# Patient Record
Sex: MALE | Race: WHITE | NOT HISPANIC OR LATINO | Employment: OTHER | URBAN - METROPOLITAN AREA
[De-identification: names, ages, dates, MRNs, and addresses within clinical notes are randomized per-mention and may not be internally consistent; named-entity substitution may affect disease eponyms.]

---

## 2017-11-07 ENCOUNTER — ALLSCRIPTS OFFICE VISIT (OUTPATIENT)
Dept: OTHER | Facility: OTHER | Age: 67
End: 2017-11-07

## 2017-11-07 ENCOUNTER — GENERIC CONVERSION - ENCOUNTER (OUTPATIENT)
Dept: OTHER | Facility: OTHER | Age: 67
End: 2017-11-07

## 2017-11-07 DIAGNOSIS — N28.89 OTHER SPECIFIED DISORDERS OF KIDNEY AND URETER: ICD-10-CM

## 2017-11-07 LAB
BILIRUB UR QL STRIP: NORMAL
CLARITY UR: NORMAL
COLOR UR: YELLOW
GLUCOSE (HISTORICAL): NORMAL
HGB UR QL STRIP.AUTO: NORMAL
KETONES UR STRIP-MCNC: NORMAL MG/DL
LEUKOCYTE ESTERASE UR QL STRIP: NORMAL
NITRITE UR QL STRIP: NORMAL
PH UR STRIP.AUTO: 6.5 [PH]
PROT UR STRIP-MCNC: NORMAL MG/DL
SP GR UR STRIP.AUTO: 1.01
UROBILINOGEN UR QL STRIP.AUTO: 0.2

## 2017-11-13 ENCOUNTER — HOSPITAL ENCOUNTER (OUTPATIENT)
Dept: CT IMAGING | Facility: HOSPITAL | Age: 67
Discharge: HOME | End: 2017-11-13
Attending: UROLOGY
Payer: MEDICARE

## 2017-11-13 DIAGNOSIS — N28.89 OTHER SPECIFIED DISORDERS OF KIDNEY AND URETER: ICD-10-CM

## 2017-11-13 PROCEDURE — 74178 CT ABD&PLV WO CNTR FLWD CNTR: CPT

## 2017-11-13 RX ADMIN — IOHEXOL 100 ML: 350 INJECTION, SOLUTION INTRAVENOUS at 20:05

## 2017-12-06 ENCOUNTER — ALLSCRIPTS OFFICE VISIT (OUTPATIENT)
Dept: OTHER | Facility: OTHER | Age: 67
End: 2017-12-06

## 2017-12-06 LAB
BILIRUB UR QL STRIP: NORMAL
CLARITY UR: NORMAL
COLOR UR: YELLOW
GLUCOSE (HISTORICAL): NORMAL
HGB UR QL STRIP.AUTO: NORMAL
KETONES UR STRIP-MCNC: NORMAL MG/DL
LEUKOCYTE ESTERASE UR QL STRIP: NORMAL
NITRITE UR QL STRIP: NORMAL
PH UR STRIP.AUTO: 6.5 [PH]
PROT UR STRIP-MCNC: NORMAL MG/DL
SP GR UR STRIP.AUTO: 1.02
UROBILINOGEN UR QL STRIP.AUTO: 1

## 2017-12-11 ENCOUNTER — ANESTHESIA EVENT (OUTPATIENT)
Dept: PERIOP | Facility: HOSPITAL | Age: 67
End: 2017-12-11
Payer: MEDICARE

## 2017-12-11 RX ORDER — SODIUM CHLORIDE 9 MG/ML
125 INJECTION, SOLUTION INTRAVENOUS CONTINUOUS
Status: CANCELLED | OUTPATIENT
Start: 2017-12-22

## 2017-12-12 ENCOUNTER — HOSPITAL ENCOUNTER (OUTPATIENT)
Dept: NON INVASIVE DIAGNOSTICS | Facility: HOSPITAL | Age: 67
Discharge: HOME | End: 2017-12-12
Attending: UROLOGY
Payer: MEDICARE

## 2017-12-12 ENCOUNTER — GENERIC CONVERSION - ENCOUNTER (OUTPATIENT)
Dept: UROLOGY | Facility: MEDICAL CENTER | Age: 67
End: 2017-12-12

## 2017-12-12 ENCOUNTER — APPOINTMENT (OUTPATIENT)
Dept: LAB | Facility: HOSPITAL | Age: 67
End: 2017-12-12
Attending: UROLOGY
Payer: MEDICARE

## 2017-12-12 ENCOUNTER — TRANSCRIBE ORDERS (OUTPATIENT)
Dept: ADMINISTRATIVE | Facility: HOSPITAL | Age: 67
End: 2017-12-12

## 2017-12-12 ENCOUNTER — APPOINTMENT (OUTPATIENT)
Dept: PREADMISSION TESTING | Facility: HOSPITAL | Age: 67
End: 2017-12-12
Payer: MEDICARE

## 2017-12-12 VITALS
SYSTOLIC BLOOD PRESSURE: 140 MMHG | WEIGHT: 267.8 LBS | TEMPERATURE: 96.3 F | HEART RATE: 87 BPM | RESPIRATION RATE: 18 BRPM | BODY MASS INDEX: 34.37 KG/M2 | DIASTOLIC BLOOD PRESSURE: 80 MMHG | HEIGHT: 74 IN

## 2017-12-12 DIAGNOSIS — Z01.818 PREOP EXAMINATION: ICD-10-CM

## 2017-12-12 DIAGNOSIS — Z01.818 PREOP EXAMINATION: Primary | ICD-10-CM

## 2017-12-12 LAB
ANION GAP SERPL CALCULATED.3IONS-SCNC: 8 MMOL/L (ref 4–13)
ATRIAL RATE: 78 BPM
BASOPHILS # BLD AUTO: 0.04 THOUSANDS/ΜL (ref 0–0.1)
BASOPHILS NFR BLD AUTO: 0 % (ref 0–1)
BILIRUB UR QL STRIP: NEGATIVE
BUN SERPL-MCNC: 18 MG/DL (ref 5–25)
CALCIUM SERPL-MCNC: 9.2 MG/DL (ref 8.3–10.1)
CHLORIDE SERPL-SCNC: 104 MMOL/L (ref 100–108)
CLARITY UR: CLEAR
CO2 SERPL-SCNC: 28 MMOL/L (ref 21–32)
COLOR UR: YELLOW
CREAT SERPL-MCNC: 1.08 MG/DL (ref 0.6–1.3)
EOSINOPHIL # BLD AUTO: 0.19 THOUSAND/ΜL (ref 0–0.61)
EOSINOPHIL NFR BLD AUTO: 2 % (ref 0–6)
ERYTHROCYTE [DISTWIDTH] IN BLOOD BY AUTOMATED COUNT: 12.7 % (ref 11.6–15.1)
GFR SERPL CREATININE-BSD FRML MDRD: 71 ML/MIN/1.73SQ M
GLUCOSE SERPL-MCNC: 121 MG/DL (ref 65–140)
GLUCOSE UR STRIP-MCNC: NEGATIVE MG/DL
HCT VFR BLD AUTO: 45.6 % (ref 36.5–49.3)
HGB BLD-MCNC: 15.4 G/DL (ref 12–17)
HGB UR QL STRIP.AUTO: NEGATIVE
KETONES UR STRIP-MCNC: NEGATIVE MG/DL
LEUKOCYTE ESTERASE UR QL STRIP: NEGATIVE
LYMPHOCYTES # BLD AUTO: 2.22 THOUSANDS/ΜL (ref 0.6–4.47)
LYMPHOCYTES NFR BLD AUTO: 23 % (ref 14–44)
MCH RBC QN AUTO: 32.7 PG (ref 26.8–34.3)
MCHC RBC AUTO-ENTMCNC: 33.8 G/DL (ref 31.4–37.4)
MCV RBC AUTO: 97 FL (ref 82–98)
MONOCYTES # BLD AUTO: 0.75 THOUSAND/ΜL (ref 0.17–1.22)
MONOCYTES NFR BLD AUTO: 8 % (ref 4–12)
NEUTROPHILS # BLD AUTO: 6.43 THOUSANDS/ΜL (ref 1.85–7.62)
NEUTS SEG NFR BLD AUTO: 67 % (ref 43–75)
NITRITE UR QL STRIP: NEGATIVE
NRBC BLD AUTO-RTO: 0 /100 WBCS
P AXIS: 66 DEGREES
PH UR STRIP.AUTO: 5.5 [PH] (ref 4.5–8)
PLATELET # BLD AUTO: 287 THOUSANDS/UL (ref 149–390)
PMV BLD AUTO: 10.6 FL (ref 8.9–12.7)
POTASSIUM SERPL-SCNC: 4.2 MMOL/L (ref 3.5–5.3)
PR INTERVAL: 218 MS
PROT UR STRIP-MCNC: NEGATIVE MG/DL
QRS AXIS: 50 DEGREES
QRSD INTERVAL: 88 MS
QT INTERVAL: 352 MS
QTC INTERVAL: 401 MS
RBC # BLD AUTO: 4.71 MILLION/UL (ref 3.88–5.62)
SODIUM SERPL-SCNC: 140 MMOL/L (ref 136–145)
SP GR UR STRIP.AUTO: 1.02 (ref 1–1.03)
T WAVE AXIS: 58 DEGREES
UROBILINOGEN UR QL STRIP.AUTO: 0.2 E.U./DL
VENTRICULAR RATE: 78 BPM
WBC # BLD AUTO: 9.63 THOUSAND/UL (ref 4.31–10.16)

## 2017-12-12 PROCEDURE — 36415 COLL VENOUS BLD VENIPUNCTURE: CPT

## 2017-12-12 PROCEDURE — 80048 BASIC METABOLIC PNL TOTAL CA: CPT

## 2017-12-12 PROCEDURE — 85025 COMPLETE CBC W/AUTO DIFF WBC: CPT

## 2017-12-12 PROCEDURE — 81003 URINALYSIS AUTO W/O SCOPE: CPT | Performed by: UROLOGY

## 2017-12-12 PROCEDURE — 93005 ELECTROCARDIOGRAM TRACING: CPT

## 2017-12-12 RX ORDER — LEVOTHYROXINE SODIUM 0.07 MG/1
50 TABLET ORAL DAILY
COMMUNITY

## 2017-12-12 RX ORDER — CARBAMAZEPINE 400 MG/1
400 TABLET, EXTENDED RELEASE ORAL 3 TIMES DAILY
COMMUNITY

## 2017-12-12 RX ORDER — ROSUVASTATIN CALCIUM 20 MG/1
20 TABLET, COATED ORAL DAILY
COMMUNITY

## 2017-12-12 RX ORDER — ASPIRIN 81 MG/1
81 TABLET ORAL DAILY
COMMUNITY

## 2017-12-12 NOTE — ANESTHESIA PREPROCEDURE EVALUATION
Review of Systems/Medical History  Patient summary reviewed  Chart reviewed  No history of anesthetic complications     Cardiovascular  Hyperlipidemia,    Pulmonary  Smoker ex-smoker , ,        GI/Hepatic  Negative GI/hepatic ROS          Genitourinary malignancy Bladder cancer,        Endo/Other  History of thyroid disease , hypothyroidism,      GYN       Hematology   Musculoskeletal  Obesity ,        Neurology  Seizures well controlled,    Comment: LAST x 14 YRS Psychology           Physical Exam    Airway    Mallampati score: II  TM Distance: >3 FB  Neck ROM: full     Dental   Comment: NO TEETH,     Cardiovascular  Rhythm: regular, Rate: normal, Cardiovascular exam normal    Pulmonary  Pulmonary exam normal Breath sounds clear to auscultation,     Other Findings        Anesthesia Plan  ASA Score- 3       Anesthesia Type- general with ASA Monitors  Additional Monitors:   Airway Plan:           Induction- intravenous  Informed Consent- Anesthetic plan and risks discussed with patient

## 2017-12-12 NOTE — PRE-PROCEDURE INSTRUCTIONS
Pre-Surgery Instructions:   Medication Instructions    aspirin (ECOTRIN LOW STRENGTH) 81 mg EC tablet Patient was instructed by Physician and understands   carBAMazepine (TEGretol XR) 400 mg 12 hr tablet Instructed patient per Anesthesia Guidelines   Cyanocobalamin (VITAMIN B-12 PO) Instructed patient per Anesthesia Guidelines   levothyroxine 75 mcg tablet Instructed patient per Anesthesia Guidelines   Naproxen Sodium (ALEVE PO) Patient was instructed by Physician and understands   rosuvastatin (CRESTOR) 20 MG tablet Instructed patient per Anesthesia Guidelines  Instructed to take tegretol and levothyroxine am of surgery with sip of water per anesthesia DR Sabrina Francois

## 2017-12-22 ENCOUNTER — ANESTHESIA (OUTPATIENT)
Dept: PERIOP | Facility: HOSPITAL | Age: 67
End: 2017-12-22
Payer: MEDICARE

## 2017-12-22 ENCOUNTER — HOSPITAL ENCOUNTER (OUTPATIENT)
Dept: RADIOLOGY | Facility: HOSPITAL | Age: 67
Setting detail: OUTPATIENT SURGERY
Discharge: HOME | End: 2017-12-22
Payer: MEDICARE

## 2017-12-22 ENCOUNTER — HOSPITAL ENCOUNTER (OUTPATIENT)
Facility: HOSPITAL | Age: 67
Setting detail: OUTPATIENT SURGERY
Discharge: HOME | End: 2017-12-22
Attending: UROLOGY | Admitting: UROLOGY
Payer: MEDICARE

## 2017-12-22 VITALS
SYSTOLIC BLOOD PRESSURE: 130 MMHG | HEART RATE: 88 BPM | OXYGEN SATURATION: 95 % | HEIGHT: 74 IN | BODY MASS INDEX: 34.37 KG/M2 | RESPIRATION RATE: 16 BRPM | TEMPERATURE: 97.7 F | WEIGHT: 267.8 LBS | DIASTOLIC BLOOD PRESSURE: 68 MMHG

## 2017-12-22 DIAGNOSIS — C67.8 MALIGNANT NEOPLASM OF OVERLAPPING SITES OF BLADDER (HCC): ICD-10-CM

## 2017-12-22 LAB
GLUCOSE SERPL-MCNC: 101 MG/DL (ref 65–140)
GLUCOSE SERPL-MCNC: 121 MG/DL (ref 65–140)
PLATELET # BLD AUTO: 300 THOUSANDS/UL (ref 149–390)
PMV BLD AUTO: 9.4 FL (ref 8.9–12.7)

## 2017-12-22 PROCEDURE — 88307 TISSUE EXAM BY PATHOLOGIST: CPT | Performed by: UROLOGY

## 2017-12-22 PROCEDURE — C1769 GUIDE WIRE: HCPCS | Performed by: UROLOGY

## 2017-12-22 PROCEDURE — 85049 AUTOMATED PLATELET COUNT: CPT | Performed by: UROLOGY

## 2017-12-22 PROCEDURE — 82948 REAGENT STRIP/BLOOD GLUCOSE: CPT

## 2017-12-22 RX ORDER — FENTANYL CITRATE 50 UG/ML
INJECTION, SOLUTION INTRAMUSCULAR; INTRAVENOUS AS NEEDED
Status: DISCONTINUED | OUTPATIENT
Start: 2017-12-22 | End: 2017-12-22 | Stop reason: SURG

## 2017-12-22 RX ORDER — ONDANSETRON 2 MG/ML
4 INJECTION INTRAMUSCULAR; INTRAVENOUS ONCE AS NEEDED
Status: DISCONTINUED | OUTPATIENT
Start: 2017-12-22 | End: 2017-12-22 | Stop reason: HOSPADM

## 2017-12-22 RX ORDER — SORBITOL 30 G/1000ML
IRRIGANT IRRIGATION AS NEEDED
Status: DISCONTINUED | OUTPATIENT
Start: 2017-12-22 | End: 2017-12-22 | Stop reason: HOSPADM

## 2017-12-22 RX ORDER — FENTANYL CITRATE/PF 50 MCG/ML
50 SYRINGE (ML) INJECTION
Status: DISCONTINUED | OUTPATIENT
Start: 2017-12-22 | End: 2017-12-22 | Stop reason: HOSPADM

## 2017-12-22 RX ORDER — PROPOFOL 10 MG/ML
INJECTION, EMULSION INTRAVENOUS AS NEEDED
Status: DISCONTINUED | OUTPATIENT
Start: 2017-12-22 | End: 2017-12-22 | Stop reason: SURG

## 2017-12-22 RX ORDER — SODIUM CHLORIDE 9 MG/ML
125 INJECTION, SOLUTION INTRAVENOUS CONTINUOUS
Status: DISCONTINUED | OUTPATIENT
Start: 2017-12-22 | End: 2017-12-22 | Stop reason: HOSPADM

## 2017-12-22 RX ORDER — ONDANSETRON 2 MG/ML
INJECTION INTRAMUSCULAR; INTRAVENOUS AS NEEDED
Status: DISCONTINUED | OUTPATIENT
Start: 2017-12-22 | End: 2017-12-22 | Stop reason: SURG

## 2017-12-22 RX ORDER — HEPARIN SODIUM 5000 [USP'U]/ML
5000 INJECTION, SOLUTION INTRAVENOUS; SUBCUTANEOUS ONCE
Status: DISCONTINUED | OUTPATIENT
Start: 2017-12-22 | End: 2017-12-22 | Stop reason: HOSPADM

## 2017-12-22 RX ORDER — CIPROFLOXACIN 250 MG/1
500 TABLET, FILM COATED ORAL EVERY 12 HOURS SCHEDULED
Qty: 10 TABLET | Refills: 0 | Status: SHIPPED | OUTPATIENT
Start: 2017-12-22 | End: 2017-12-27

## 2017-12-22 RX ORDER — MEPERIDINE HYDROCHLORIDE 50 MG/ML
12.5 INJECTION INTRAMUSCULAR; INTRAVENOUS; SUBCUTANEOUS ONCE AS NEEDED
Status: DISCONTINUED | OUTPATIENT
Start: 2017-12-22 | End: 2017-12-22 | Stop reason: HOSPADM

## 2017-12-22 RX ADMIN — SODIUM CHLORIDE 125 ML/HR: 0.9 INJECTION, SOLUTION INTRAVENOUS at 11:09

## 2017-12-22 RX ADMIN — PROPOFOL 200 MG: 10 INJECTION, EMULSION INTRAVENOUS at 12:58

## 2017-12-22 RX ADMIN — ONDANSETRON HYDROCHLORIDE 4 MG: 2 INJECTION, SOLUTION INTRAVENOUS at 13:52

## 2017-12-22 RX ADMIN — SODIUM CHLORIDE: 0.9 INJECTION, SOLUTION INTRAVENOUS at 13:25

## 2017-12-22 RX ADMIN — FENTANYL CITRATE 25 MCG: 50 INJECTION INTRAMUSCULAR; INTRAVENOUS at 13:09

## 2017-12-22 RX ADMIN — FENTANYL CITRATE 50 MCG: 50 INJECTION INTRAMUSCULAR; INTRAVENOUS at 14:09

## 2017-12-22 RX ADMIN — Medication 3000 MG: at 12:51

## 2017-12-22 RX ADMIN — FENTANYL CITRATE 50 MCG: 50 INJECTION INTRAMUSCULAR; INTRAVENOUS at 12:58

## 2017-12-22 RX ADMIN — FENTANYL CITRATE 50 MCG: 50 INJECTION INTRAMUSCULAR; INTRAVENOUS at 14:07

## 2017-12-22 RX ADMIN — FENTANYL CITRATE 25 MCG: 50 INJECTION INTRAMUSCULAR; INTRAVENOUS at 14:01

## 2017-12-22 RX ADMIN — LIDOCAINE HYDROCHLORIDE 100 MG: 20 INJECTION, SOLUTION INTRAVENOUS at 12:58

## 2017-12-22 RX ADMIN — FENTANYL CITRATE 50 MCG: 50 INJECTION INTRAMUSCULAR; INTRAVENOUS at 13:24

## 2017-12-22 NOTE — DISCHARGE INSTRUCTIONS
Transurethral Resection of Bladder Tumors   WHAT YOU NEED TO KNOW:   Transurethral resection of bladder tumors (TURBT) is surgery to remove one or more tumors from your bladder  DISCHARGE INSTRUCTIONS:   Medicines:   · Medicines  help decrease pain or prevent vomiting  · Take your medicine as directed  Contact your healthcare provider if you think your medicine is not helping or if you have side effects  Tell him or her if you are allergic to any medicine  Keep a list of the medicines, vitamins, and herbs you take  Include the amounts, and when and why you take them  Bring the list or the pill bottles to follow-up visits  Carry your medicine list with you in case of an emergency  Follow up with your healthcare provider as directed:  Write down your questions so you remember to ask them during your visits  Care for your Miguel catheter:  Keep the bag below your waist  This will prevent urine from flowing back into your bladder and causing an infection or other problems  Also, keep the tube free of kinks so the urine will drain properly  Do not pull on the catheter  This can cause pain and bleeding and may cause the catheter to come out  Empty your urine drainage bag when it is ½ to ? full, or every 8 hours  If you have a smaller leg bag, empty it every 3 to 4 hours  Do the following when you empty your urine drainage bag:  · Hold the urine bag over the toilet or a large container  · Remove the drain spout from its sleeve at the bottom of the urine bag  Do not touch the tip of the drain spout  Open the slide valve on the spout  · Let the urine flow out of the urine bag into the toilet or container  Do not let the drainage tube touch anything  · Clean the end of the drain spout with alcohol when the bag is empty  Ask which cleaning solution is best to use  · Close the slide valve and put the drain spout into its sleeve at the bottom of the urine bag   Write down how much urine was in your bag if you were asked to keep a record  Contact your healthcare provider if:   · You have a fever or chills  · You have new or more blood in your urine  · You have nausea or are vomiting  · You have new or more pain when you urinate  · You are unable to control when you urinate  · You have questions or concerns about your condition or care  Seek care immediately or call 911 if:   · You have heavy bleeding from your urethra  · You start to urinate less often, very little, or not at all  · You have severe pain in your abdomen or pelvis  © 2017 [address] [employer]  Information is for End User's use only and may not be sold, redistributed or otherwise used for commercial purposes  All illustrations and images included in CareNotes® are the copyrighted property of "[employer] , [employer]  or [employer]  The above information is an  only  It is not intended as medical advice for individual conditions or treatments  Talk to your doctor, nurse or pharmacist before following any medical regimen to see if it is safe and effective for you  Miguel Catheter Placement and Care   WHAT YOU NEED TO KNOW:   A Miguel catheter is a sterile tube that is inserted into your bladder to drain urine  It is also called an indwelling urinary catheter  The tip of the catheter has a small balloon filled with solution that holds the catheter inside your bladder  DISCHARGE INSTRUCTIONS:   Seek care immediately if:   · Your catheter comes out  · You suddenly have material that looks like sand in the tubing or drainage bag  · No urine is draining into the bag and you have checked the system  · You have pain in your hip, back, pelvis, or lower abdomen  · You are confused or cannot think clearly  Contact your healthcare provider if:   · You have a fever  · You have bladder spasms for more than 1 day after the catheter is placed      · You see blood in the tubing or drainage bag  · You have a rash or itching where the catheter tube is secured to your skin  · Urine leaks from or around the catheter, tubing, or drainage bag  · The closed drainage system has accidently come open or apart  · You see a layer of crystals inside the tubing  · You have questions or concerns about your condition or care  Care for your Miguel catheter:   · Clean your genital area 2 times every day  Clean your catheter and the area around where it was inserted  Use soap and water  Clean your anal opening and catheter area after every bowel movement  · Secure the catheter tube  so you do not pull or move the catheter  This helps prevent pain and bladder spasms  Healthcare providers will show you how to use medical tape or a strap to secure the catheter tube to your body  · Keep a closed drainage system  Your Miguel catheter should always be attached to the drainage bag to form a closed system  Do not disconnect any part of the closed system unless you need to change the bag  Care for your drainage bag:   · Ask if a leg bag is right for you  A leg bag can be worn under your clothes  Ask your healthcare provider for more information about a leg bag  · Keep the drainage bag below the level of your waist   This helps stop urine from moving back up the tubing and into your bladder  Do not loop or kink the tubing  This can cause urine to back up and collect in your bladder  Do not let the drainage bag touch or lie on the floor  · Empty the drainage bag when needed  The weight of a full drainage bag can be painful  Empty the drainage bag every 3 to 6 hours or when it is ? full  · Clean and change the drainage bag as directed  Ask your healthcare provider how often you should change the drainage bag and what cleaning solution to use  Wear disposable gloves when you change the bag  Do not allow the end of the catheter or tubing to touch anything   Clean the ends with an alcohol pad before you reconnect them  What to do if problems develop:   · No urine is draining into the bag:      ¨ Check for kinks in the tubing and straighten them out  ¨ Check the tape or strap used to secure the catheter tube to your skin  Make sure it is not blocking the tube  ¨ Make sure you are not sitting or lying on the tubing  ¨ Make sure the urine bag is hanging below the level of your waist     · Urine leaks from or around the catheter, tubing, or drainage bag:  Check if the closed drainage system has accidently come open or apart  Clean the catheter and tubing ends with a new alcohol pad and reconnect them  Prevent an infection:   · Wash your hands often  Wash before and after you touch your catheter, tubing, or drainage bag  Use soap and water  Wear clean disposable gloves when you care for your catheter or disconnect the drainage bag  Wash your hands before you prepare or eat food  · Drink liquids as directed  Ask your healthcare provider how much liquid to drink each day and which liquids are best for you  Liquids will help flush your kidneys and bladder to help prevent infection  Follow up with your healthcare provider as directed:  Write down your questions so you remember to ask them during your visits  © 2017 [address] [employer]  Information is for End User's use only and may not be sold, redistributed or otherwise used for commercial purposes  All illustrations and images included in CareNotes® are the copyrighted property of [employer]  or Gaetano Billy  The above information is an  only  It is not intended as medical advice for individual conditions or treatments  Talk to your doctor, nurse or pharmacist before following any medical regimen to see if it is safe and effective for you

## 2017-12-22 NOTE — DISCHARGE SUMMARY
Discharge Summary - Isaac Cai 79 y o  male MRN: [id number]    Unit/Bed#: OR POOL Encounter: [id number]    Admission Date: 12/22/2017     Discharge Date:   12/22/2017  Admitting Diagnosis: Malignant neoplasm of overlapping sites of bladder [facility]) [C67 8]    Admitting Provider: Ria Garcia MD    Discharging Provider: Ria Garcia MD    Primary Care Physician at Discharge: Quan Benjamin [phone]     HPI:This is a 79 y o  old male presented with Malignant neoplasm of overlapping sites of bladder ([address] ) [C67 8], the patient underwent cystourethroscopy in my office at which time multiple papillary bladder tumors were identified on the trigone posterior wall of the bladder and left lateral wall the bladder  The patient now presents for TURBT and instillation of intravesical mitomycin C for control of multifocal bladder malignancies  Allergies   Allergen Reactions    Bactrim [Sulfamethoxazole-Trimethoprim] Itching       Consults   None       Order Current Status    Tissue Exam Collected (12/22/17 6681)          Procedures Performed: No orders of the defined types were placed in this encounter  Hospital Course: The patient tolerated hospitalization all procedures quite well without complication  Significant Findings, Care, Treatment and Services Provided:  Trans urethral resection of multiple bladder tumors approximately 2 cm in size each with intravesical mitomycin installation  Complications:  None    Discharge Diagnosis:  Multifocal urothelial carcinoma of the bladder    Condition at Discharge: good     Discharge instructions/Information to patient and family:   See after visit summary for information provided to patient and family  Provisions for Follow-Up Care:  See after visit summary for information related to follow-up care and any pertinent home health orders        Disposition: Home    Planned Readmission: No    Discharge Statement   I spent 40 minutes discharging the patient  This time was spent on the day of discharge  I had direct contact with the patient on the day of discharge  Additional documentation is required if more than 30 minutes were spent on discharge  Discharge Medications:  See after visit summary for reconciled discharge medications provided to patient and family          ?  ?

## 2017-12-22 NOTE — INTERIM OP NOTE
CYSTOSCOPY; INTRAVESICAL MITOMYCIN, TRANSURETHRAL RESECTION OF BLADDER TUMOR (TURBT)  Postoperative Note  PATIENT NAME: Aline Ray  : 1950  MRN: [id number]  AL OR ROOM 06    Surgery Date: 2017    Preop Diagnosis:  Malignant neoplasm of overlapping sites of bladder ([facility] [address] ) [C67 8]    Post-Op Diagnosis Codes:     * Malignant neoplasm of overlapping sites of bladder ([address] ) [c67 8]  Deep bulbar urethral stricture     Procedure(s) (LRB):  CYSTOSCOPY; INTRAVESICAL MITOMYCIN (N/A)  TRANSURETHRAL RESECTION OF BLADDER TUMOR (TURBT) (N/A)   Urethral dilation    Surgeon(s) and Role:     * Shweta Diamond MD - Primary    Specimens:Multiple bladder lesions consistent with papillary bladder cancer      Estimated Blood Loss:   Minimal    Anesthesia Type:   General     Findings:    Multiple papillary bladder tumors on the posterior wall left lateral wall and trigone of the urinary bladder  Resection took place without complication without evidence of bladder perforation and without injury to the ureteral orifices bilaterally  There was no active bleeding at the end of the procedure at which time intravesical mitomycin C was placed in the bladder for 30 minutes and then released    Complications:   None    SIGNATURE: Shweta Diamond MD   DATE: 2017   TIME: 12:47 PM

## 2017-12-27 ENCOUNTER — ALLSCRIPTS OFFICE VISIT (OUTPATIENT)
Dept: OTHER | Facility: OTHER | Age: 67
End: 2017-12-27

## 2017-12-27 ENCOUNTER — GENERIC CONVERSION - ENCOUNTER (OUTPATIENT)
Dept: OTHER | Facility: OTHER | Age: 67
End: 2017-12-27

## 2018-01-10 ENCOUNTER — ALLSCRIPTS OFFICE VISIT (OUTPATIENT)
Dept: OTHER | Facility: OTHER | Age: 68
End: 2018-01-10

## 2018-01-10 LAB
BILIRUB UR QL STRIP: NORMAL
CLARITY UR: NORMAL
COLOR UR: YELLOW
GLUCOSE (HISTORICAL): NORMAL
HGB UR QL STRIP.AUTO: NORMAL
KETONES UR STRIP-MCNC: NORMAL MG/DL
LEUKOCYTE ESTERASE UR QL STRIP: NORMAL
NITRITE UR QL STRIP: NORMAL
PH UR STRIP.AUTO: 6 [PH]
PROT UR STRIP-MCNC: 100 MG/DL
SP GR UR STRIP.AUTO: 1.02
UROBILINOGEN UR QL STRIP.AUTO: 0.2

## 2018-01-13 VITALS
SYSTOLIC BLOOD PRESSURE: 142 MMHG | DIASTOLIC BLOOD PRESSURE: 80 MMHG | HEIGHT: 74 IN | WEIGHT: 270 LBS | BODY MASS INDEX: 34.65 KG/M2

## 2018-01-17 NOTE — MISCELLANEOUS
Message   Recorded as Task   Date: 11/09/2017 02:10 PM, Created By: Olayinka Galaviz   Task Name: Miscellaneous   Assigned To: César ESPINOZA,TEAM   Regarding Patient: Sam Sahni, Status: Active   Comment:    Olayinka Galaviz - 09 Nov 2017 2:10 PM     TASK CREATED    [facility] Reading Room Pt scheduled for ct scan and needs a BUN/ CR done prior to his scan on Monday 11/13/17  [facility] - 09 Nov 2017 2:25 PM     TASK EDITED  Called to inform pt and he stated that he had lab work 2 weeks ago at [facility] and will bring with him to the CT scan apt  Active Problems    1  BPH without urinary obstruction (600 00) (N40 0)   2  Right renal mass (593 9) (N28 89)    Current Meds   1  Adult Aspirin EC Low Strength 81 MG Oral Tablet Delayed Release; Therapy: (Recorded:31Oct2017) to Recorded   2  Crestor 10 MG Oral Tablet (Rosuvastatin Calcium); Therapy: (Recorded:31Oct2017) to Recorded   3  TEGretol- MG Oral Tablet Extended Release 12 Hour (CarBAMazepine ER); Therapy: (Recorded:31Oct2017) to Recorded   4  Vitamin B12 TABS; Therapy: (Recorded:31Oct2017) to Recorded    Allergies    1   Bactrim DS TABS    Signatures   Electronically signed by : Mason Schilder, ; Nov 9 2017  2:25PM EST                       (Author)

## 2018-01-22 VITALS
SYSTOLIC BLOOD PRESSURE: 126 MMHG | DIASTOLIC BLOOD PRESSURE: 72 MMHG | WEIGHT: 262 LBS | HEIGHT: 74 IN | BODY MASS INDEX: 33.62 KG/M2

## 2018-01-23 VITALS — HEIGHT: 74 IN | WEIGHT: 265 LBS | BODY MASS INDEX: 34.01 KG/M2

## 2018-01-23 VITALS
SYSTOLIC BLOOD PRESSURE: 144 MMHG | DIASTOLIC BLOOD PRESSURE: 82 MMHG | BODY MASS INDEX: 33.62 KG/M2 | WEIGHT: 262 LBS | HEIGHT: 74 IN

## 2018-01-23 NOTE — MISCELLANEOUS
Message   Recorded as Task   Date: 12/22/2017 03:52 PM, Created By: Annabel Bradley   Task Name: Care Coordination   Assigned To: César ESPINOZA,TEAM   Regarding Patient: Brian Gomez, Status: Active   Comment:    Annabel Bradley - 22 Dec 2017 3:52 PM     TASK CREATED  Please call [facility] same day surgery in regards to getting patient scheduled with the nurse they can be reached at  please and thank you  Samuel Granados - 22 Dec 2017 4:09 PM     TASK EDITED  Called and scheduled apt same day will inform pt  Active Problems    1  BPH without urinary obstruction (600 00) (N40 0)   2  Malignant neoplasm of urinary bladder, unspecified site (188 9) (C67 9)   3  Right renal mass (593 9) (N28 89)    Current Meds   1  Adult Aspirin EC Low Strength 81 MG Oral Tablet Delayed Release; Therapy: (Recorded:31Oct2017) to Recorded   2  Crestor 10 MG Oral Tablet (Rosuvastatin Calcium); Therapy: (Recorded:31Oct2017) to Recorded   3  TEGretol- MG Oral Tablet Extended Release 12 Hour (CarBAMazepine ER); Therapy: (Recorded:31Oct2017) to Recorded   4  Vitamin B12 TABS; Therapy: (Recorded:31Oct2017) to Recorded    Allergies    1   Bactrim DS TABS    Signatures   Electronically signed by : Esvin Michelle, ; Dec 22 2017  4:09PM EST                       (Author)

## 2018-01-23 NOTE — PROGRESS NOTES
Chief Complaint  Patient presents for Miguel Catheter removal after 12/22/17 procedure  Active Problems    1  BPH without urinary obstruction (600 00) (N40 0)   2  Malignant neoplasm of urinary bladder, unspecified site (188 9) (C67 9)   3  Right renal mass (593 9) (N28 89)    Current Meds   1  Adult Aspirin EC Low Strength 81 MG Oral Tablet Delayed Release; Therapy: (Recorded:31Oct2017) to Recorded   2  Crestor 10 MG Oral Tablet; Therapy: (Recorded:31Oct2017) to Recorded   3  TEGretol- MG Oral Tablet Extended Release 12 Hour; Therapy: (Recorded:31Oct2017) to Recorded   4  Vitamin B12 TABS; Therapy: (Recorded:31Oct2017) to Recorded    Allergies    1  Bactrim DS TABS    Vitals  Signs    Systolic: 127  Diastolic: 82  Height: 6 ft 2 in  Weight: 262 lb   BMI Calculated: 33 64  BSA Calculated: 2 44    Procedure    Procedure: Miguel Cath Removal   Patient had blood in Miguel bag which Dr Renuka Guaman wanted irrigated before Miguel Catheter was removed  Miguel was irrigated until urine return was light pink  Dr Renuka Guaman said to take Miguel Catheter out  Miguel Catheter was removed without difficulty  Patient advised to increase fluid intake, avoid caffeine and not to take blood thinners until Dr Renuka Guaman sees him  If patient cannot void, trouble voiding or increased blood to call office by 3p        Plan  Follow up 1-2 weeks with Dr Laquita Torres    Date/Time Provider Specialty Site   01/10/2018 09:15 AM Elizabeth Bolaños MD Urology [address]     Signatures   Electronically signed by : Mariano Calixto, ; Dec 27 2017  9:33AM EST                       (Co-author)    Electronically signed by : Caity Alfredo MD; Dec 27 2017 10:18AM EST                       (Author)

## 2018-01-23 NOTE — MISCELLANEOUS
Message   Recorded as Task   Date: 12/27/2017 06:44 PM, Created By: Phillip Mahajan   Task Name: Care Coordination   Assigned To: César CleaningB,TEAM   Regarding Patient: Rex Manriquez, Status: Active   CommentAline Camryn - 27 Dec 2017 6:44 PM     TASK CREATED  Patient of Dr Salinas Robison post ip from TURBT  Had yuen removed today  Having urinary freqency and urgency with small amounts of urine voided  Some dysuria  Denies any nausea, vomiting, fevers, or chills  Rx sent to pharmacy and instructed to take a dose of OTC AZO  Please contact in AM  If symptoms persisiting may need follow up  Thank you  Lola Kitchen - 28 Dec 2017 8:59 AM     TASK REASSIGNED: Previously Assigned To [address] [facility] - 28 Dec 2017 9:20 AM     TASK EDITED  Pt states that he is feeling better and will contact office if needs further assistance  Active Problems    1  BPH without urinary obstruction (600 00) (N40 0)   2  Malignant neoplasm of urinary bladder, unspecified site (188 9) (C67 9)   3  Right renal mass (593 9) (N28 89)   4  Urinary frequency (788 41) (R35 0)    Current Meds   1  Adult Aspirin EC Low Strength 81 MG Oral Tablet Delayed Release; Therapy: (Recorded:31Oct2017) to Recorded   2  Crestor 10 MG Oral Tablet (Rosuvastatin Calcium); Therapy: (Recorded:31Oct2017) to Recorded   3  Tamsulosin HCl - 0 4 MG Oral Capsule; take 1 capsule by mouth at bedtime; Therapy: [id number] to (Last Rx:[id number]) Ordered   4  TEGretol- MG Oral Tablet Extended Release 12 Hour (CarBAMazepine ER); Therapy: (Recorded:31Oct2017) to Recorded   5  Vitamin B12 TABS; Therapy: (Recorded:31Oct2017) to Recorded    Allergies    1   Bactrim DS TABS    Signatures   Electronically signed by : Salina Munoz, ; Dec 28 2017  9:21AM EST                       (Author)

## 2018-01-31 ENCOUNTER — TRANSCRIBE ORDERS (OUTPATIENT)
Dept: LAB | Facility: CLINIC | Age: 68
End: 2018-01-31

## 2018-01-31 ENCOUNTER — APPOINTMENT (OUTPATIENT)
Dept: LAB | Facility: CLINIC | Age: 68
End: 2018-01-31
Payer: MEDICARE

## 2018-01-31 DIAGNOSIS — R35.0 URINARY FREQUENCY: Primary | ICD-10-CM

## 2018-01-31 DIAGNOSIS — R35.0 URINARY FREQUENCY: ICD-10-CM

## 2018-01-31 PROCEDURE — 87086 URINE CULTURE/COLONY COUNT: CPT

## 2018-02-01 LAB — BACTERIA UR CULT: NORMAL

## 2018-02-02 ENCOUNTER — TELEPHONE (OUTPATIENT)
Dept: UROLOGY | Facility: AMBULATORY SURGERY CENTER | Age: 68
End: 2018-02-02

## 2018-02-05 ENCOUNTER — HOSPITAL ENCOUNTER (OUTPATIENT)
Dept: INFUSION CENTER | Facility: CLINIC | Age: 68
Discharge: HOME | End: 2018-02-05
Payer: MEDICARE

## 2018-02-05 VITALS — DIASTOLIC BLOOD PRESSURE: 57 MMHG | SYSTOLIC BLOOD PRESSURE: 118 MMHG | HEART RATE: 93 BPM | TEMPERATURE: 96.5 F

## 2018-02-05 PROCEDURE — 51720 TREATMENT OF BLADDER LESION: CPT

## 2018-02-05 RX ORDER — ALFUZOSIN HYDROCHLORIDE 10 MG/1
10 TABLET, EXTENDED RELEASE ORAL DAILY
COMMUNITY
End: 2018-12-12

## 2018-02-05 RX ADMIN — SODIUM CHLORIDE 50 MG: 9 INJECTION, SOLUTION INTRAVENOUS at 12:00

## 2018-02-05 NOTE — PROGRESS NOTES
#14fr yuen instilled without difficulty, BCG instillation as ordered at 1200, tolerated well  Written instructions given with duration, turning schedule, bathroom precautions post treatment, & amount of time to hold instillation  Patient verbalized understanding of same

## 2018-02-12 ENCOUNTER — HOSPITAL ENCOUNTER (OUTPATIENT)
Dept: INFUSION CENTER | Facility: CLINIC | Age: 68
Discharge: HOME | End: 2018-02-12
Payer: MEDICARE

## 2018-02-12 VITALS
TEMPERATURE: 96.8 F | HEART RATE: 79 BPM | DIASTOLIC BLOOD PRESSURE: 80 MMHG | SYSTOLIC BLOOD PRESSURE: 150 MMHG | RESPIRATION RATE: 18 BRPM

## 2018-02-12 PROCEDURE — 51720 TREATMENT OF BLADDER LESION: CPT

## 2018-02-12 RX ADMIN — SODIUM CHLORIDE 50 MG: 9 INJECTION, SOLUTION INTRAVENOUS at 11:23

## 2018-02-12 NOTE — PROGRESS NOTES
Patient emptied bladder upon entering unit  Reports no complications since last treatment, was able to hold for the full 2 hours  14 Fr Miguel catheter inserted using sterile technique without complication  BCG 50mg instilled by JEANNINE Finley RN at [facility] and Miguel catheter then removed  Patient offered no complaints  Patient re-educated on turning q15 minutes and holding for an additional hour  Patient verbalizes understanding of all teaching  AVS provided and patient left unit in stable condition

## 2018-02-19 ENCOUNTER — HOSPITAL ENCOUNTER (OUTPATIENT)
Dept: INFUSION CENTER | Facility: CLINIC | Age: 68
Discharge: HOME | End: 2018-02-19
Payer: MEDICARE

## 2018-02-19 VITALS
DIASTOLIC BLOOD PRESSURE: 73 MMHG | RESPIRATION RATE: 18 BRPM | TEMPERATURE: 97 F | SYSTOLIC BLOOD PRESSURE: 141 MMHG | HEART RATE: 78 BPM

## 2018-02-19 PROCEDURE — 51720 TREATMENT OF BLADDER LESION: CPT

## 2018-02-19 RX ADMIN — SODIUM CHLORIDE 50 MG: 9 INJECTION, SOLUTION INTRAVENOUS at 11:20

## 2018-02-26 ENCOUNTER — HOSPITAL ENCOUNTER (OUTPATIENT)
Dept: INFUSION CENTER | Facility: CLINIC | Age: 68
Discharge: HOME | End: 2018-02-26
Payer: MEDICARE

## 2018-02-26 VITALS
DIASTOLIC BLOOD PRESSURE: 80 MMHG | TEMPERATURE: 97.1 F | SYSTOLIC BLOOD PRESSURE: 150 MMHG | HEART RATE: 83 BPM | RESPIRATION RATE: 18 BRPM

## 2018-02-26 PROCEDURE — 51720 TREATMENT OF BLADDER LESION: CPT

## 2018-02-26 RX ADMIN — SODIUM CHLORIDE 50 MG: 9 INJECTION, SOLUTION INTRAVENOUS at 12:04

## 2018-02-26 NOTE — PROGRESS NOTES
Pt arrived to unit without complaint, 12fr yuen inserted per protocol without incident, BCG instilled per orders, yuen catheter removed  Pt tolerated all well and left unit in stable condition  Pt knowledgeable re: procedure for BCG treatment for next 2 hours at home and post BCG perineal care  Pt declines AVS today, aware of all future appts

## 2018-02-28 ENCOUNTER — APPOINTMENT (OUTPATIENT)
Dept: LAB | Facility: CLINIC | Age: 68
End: 2018-02-28
Payer: MEDICARE

## 2018-02-28 DIAGNOSIS — R35.0 URINARY FREQUENCY: ICD-10-CM

## 2018-02-28 PROCEDURE — 87186 SC STD MICRODIL/AGAR DIL: CPT

## 2018-02-28 PROCEDURE — 87086 URINE CULTURE/COLONY COUNT: CPT

## 2018-02-28 PROCEDURE — 87077 CULTURE AEROBIC IDENTIFY: CPT

## 2018-03-03 LAB — BACTERIA UR CULT: ABNORMAL

## 2018-03-04 DIAGNOSIS — N30.00 ACUTE CYSTITIS WITHOUT HEMATURIA: Primary | ICD-10-CM

## 2018-03-04 RX ORDER — CEPHALEXIN 250 MG/1
250 CAPSULE ORAL 4 TIMES DAILY
Qty: 28 CAPSULE | Refills: 0 | Status: SHIPPED | OUTPATIENT
Start: 2018-03-04 | End: 2018-03-12 | Stop reason: ALTCHOICE

## 2018-03-05 ENCOUNTER — HOSPITAL ENCOUNTER (OUTPATIENT)
Dept: INFUSION CENTER | Facility: CLINIC | Age: 68
Discharge: HOME | End: 2018-03-05

## 2018-03-05 ENCOUNTER — TELEPHONE (OUTPATIENT)
Dept: UROLOGY | Facility: MEDICAL CENTER | Age: 68
End: 2018-03-05

## 2018-03-05 NOTE — TELEPHONE ENCOUNTER
Pt has UTI  Medication was Erx to pharm  Pt  Notified  Will cancel today's BCG Instillation  Attempted to call [employer] busy  Will attempt later

## 2018-03-12 ENCOUNTER — HOSPITAL ENCOUNTER (OUTPATIENT)
Dept: INFUSION CENTER | Facility: CLINIC | Age: 68
Discharge: HOME | End: 2018-03-12
Payer: MEDICARE

## 2018-03-12 VITALS
DIASTOLIC BLOOD PRESSURE: 82 MMHG | RESPIRATION RATE: 18 BRPM | HEART RATE: 85 BPM | SYSTOLIC BLOOD PRESSURE: 152 MMHG | TEMPERATURE: 97.2 F

## 2018-03-12 PROCEDURE — 51720 TREATMENT OF BLADDER LESION: CPT

## 2018-03-12 RX ADMIN — SODIUM CHLORIDE 50 MG: 9 INJECTION, SOLUTION INTRAVENOUS at 11:46

## 2018-03-12 NOTE — PROGRESS NOTES
Confirmed with Dr Washington Roman that it is ok to proceed with BCG  Pt has completed his antibiotics and is asymptomatic of a UTI

## 2018-03-12 NOTE — PROGRESS NOTES
Patient tolerated the insertion of a 12 fr yuen catheter well without complications  BCG was instilled and pt is aware to hold his urine for 2 hours    Patient is aware to return next week

## 2018-03-12 NOTE — PLAN OF CARE

## 2018-03-19 ENCOUNTER — HOSPITAL ENCOUNTER (OUTPATIENT)
Dept: INFUSION CENTER | Facility: CLINIC | Age: 68
Discharge: HOME | End: 2018-03-19
Payer: MEDICARE

## 2018-03-19 VITALS
HEART RATE: 77 BPM | RESPIRATION RATE: 18 BRPM | DIASTOLIC BLOOD PRESSURE: 90 MMHG | TEMPERATURE: 96.8 F | SYSTOLIC BLOOD PRESSURE: 147 MMHG

## 2018-03-19 PROCEDURE — 51720 TREATMENT OF BLADDER LESION: CPT

## 2018-03-19 RX ADMIN — SODIUM CHLORIDE 50 MG: 9 INJECTION, SOLUTION INTRAVENOUS at 13:43

## 2018-03-19 NOTE — PROGRESS NOTES
Patient arrived to the infusion center for his last ordered dose of BCG  Pt tolerated the insertion of a 12 fr yuen catheter well without complications  BCG was instilled and yuen removed  Pt is aware to hold his urine for 2 hours  Pt discharged to home

## 2018-04-10 ENCOUNTER — APPOINTMENT (OUTPATIENT)
Dept: LAB | Facility: CLINIC | Age: 68
End: 2018-04-10
Payer: MEDICARE

## 2018-04-10 ENCOUNTER — TRANSCRIBE ORDERS (OUTPATIENT)
Dept: LAB | Facility: CLINIC | Age: 68
End: 2018-04-10

## 2018-04-10 DIAGNOSIS — C67.9 MALIGNANT NEOPLASM OF BLADDER (HCC): ICD-10-CM

## 2018-04-10 PROCEDURE — 88112 CYTOPATH CELL ENHANCE TECH: CPT | Performed by: PATHOLOGY

## 2018-04-17 ENCOUNTER — PROCEDURE VISIT (OUTPATIENT)
Dept: UROLOGY | Facility: MEDICAL CENTER | Age: 68
End: 2018-04-17
Payer: MEDICARE

## 2018-04-17 ENCOUNTER — TELEPHONE (OUTPATIENT)
Dept: UROLOGY | Facility: MEDICAL CENTER | Age: 68
End: 2018-04-17

## 2018-04-17 VITALS
BODY MASS INDEX: 33.62 KG/M2 | WEIGHT: 262 LBS | SYSTOLIC BLOOD PRESSURE: 148 MMHG | DIASTOLIC BLOOD PRESSURE: 90 MMHG | HEIGHT: 74 IN

## 2018-04-17 DIAGNOSIS — C67.8 MALIGNANT NEOPLASM OF OVERLAPPING SITES OF BLADDER (HCC): Primary | ICD-10-CM

## 2018-04-17 DIAGNOSIS — Z00.00 HEALTHCARE MAINTENANCE: Primary | ICD-10-CM

## 2018-04-17 PROBLEM — Z85.528 HISTORY OF RENAL CELL CANCER: Status: ACTIVE | Noted: 2018-04-17

## 2018-04-17 LAB
SL AMB  POCT GLUCOSE, UA: ABNORMAL
SL AMB LEUKOCYTE ESTERASE,UA: ABNORMAL
SL AMB POCT BILIRUBIN,UA: ABNORMAL
SL AMB POCT BLOOD,UA: ABNORMAL
SL AMB POCT CLARITY,UA: CLEAR
SL AMB POCT COLOR,UA: YELLOW
SL AMB POCT KETONES,UA: ABNORMAL
SL AMB POCT NITRITE,UA: ABNORMAL
SL AMB POCT PH,UA: 5.5
SL AMB POCT SPECIFIC GRAVITY,UA: 1.02
SL AMB POCT URINE PROTEIN: ABNORMAL
SL AMB POCT UROBILINOGEN: 0.2

## 2018-04-17 PROCEDURE — 99214 OFFICE O/P EST MOD 30 MIN: CPT | Performed by: UROLOGY

## 2018-04-17 PROCEDURE — 52000 CYSTOURETHROSCOPY: CPT | Performed by: UROLOGY

## 2018-04-17 PROCEDURE — 81003 URINALYSIS AUTO W/O SCOPE: CPT | Performed by: UROLOGY

## 2018-04-17 NOTE — TELEPHONE ENCOUNTER
Pt requested Monday mornings around 10:00 am for BCG x's 3 treatments  Able to accommodate pt 6/4-6/11-6/18 at [place]  Urine culture to be done 5/26  Slip generated  Pt notified of dates and time

## 2018-04-17 NOTE — LETTER
April 17, 2018     Annie Swan, [address] [id number] [address]    Patient: Martha Jaimes   YOB: 1950   Date of Visit: 4/17/2018       Dear Dr Luis Ackerman:    Thank you for referring Martha Jaimes to me for evaluation  Below are my notes for this consultation  If you have questions, please do not hesitate to call me  I look forward to following your patient along with you  Sincerely,        Alcira Yi MD        CC: No Recipients  Alcira Yi MD  4/17/2018 11:25 AM  Sign at close encounter  Assessment/Plan:  1  Bladder cancer-the patient has had low-grade noninvasive papillary bladder tumors with multiple recurrences over a 6 year span initially starting with presentation of gross hematuria  The patient presents for surveillance cystoscopy and is about to enter the maintenance phase of intravesical BCG therapy  He has had no complaints in that regard  He will continue with q 3 months intravesical BCG therapy weekly treatments x3 over the next 2 years  2   History of renal carcinoma  The patient is approximately 6 years status post right partial nephrectomy for renal cell carcinoma with multiple cysts in both kidneys  Staging has shown no evidence of disease  3   BPH without obstruction  The patient is due for a PSA    No problem-specific Assessment & Plan notes found for this encounter  Diagnoses and all orders for this visit:    Malignant neoplasm of overlapping sites of bladder (HCC)  -     POCT urine dip auto non-scope  -     BCG ([facility] BCG) intravesical suspension 50 mg; Instill 2 mL (50 mg total) into the bladder once a week   -     PSA Total, Diagnostic; Future  -     Comprehensive metabolic panel; Future  -     Cystoscopy; Future          Subjective:      Patient ID: Martha Jaimes is a 79 y o  male      HPI 66-year-old male followed for low-grade low stage recurrent bladder cancer and a history of renal cell carcinoma status post right partial nephrectomy in the past   The patient presents for further discussion as to maintenance BCG therapy and surveillance cystoscopy  The following portions of the patient's history were reviewed and updated as appropriate: allergies, current medications, past family history, past medical history, past social history, past surgical history and problem list     Review of Systems   Constitutional: Negative  HENT: Positive for hearing loss  Eyes: Negative  Respiratory: Negative  Cardiovascular: Negative  Gastrointestinal: Negative  Genitourinary: Negative  Musculoskeletal: Negative  Neurological: Negative  Hematological: Negative  Psychiatric/Behavioral: Negative  Objective:      /90   Ht 6' 2" (1 88 m)   Wt 119 kg (262 lb)   BMI 33 64 kg/m²           Physical Exam   Constitutional: He is oriented to person, place, and time  He appears well-developed and well-nourished  HENT:   Head: Atraumatic  Eyes: EOM are normal    Neck: Neck supple  Pulmonary/Chest: Effort normal  No respiratory distress  Abdominal: Soft  Bowel sounds are normal  He exhibits no distension and no mass  There is no tenderness  There is no rebound and no guarding  Genitourinary: Penis normal    Musculoskeletal: Normal range of motion  Neurological: He is alert and oriented to person, place, and time  Psychiatric: He has a normal mood and affect  His behavior is normal  Judgment and thought content normal          Cystoscopy  Date/Time: 4/17/2018 11:23 AM  Performed by: Crispin Myrick by: Apollo Raza     Procedure details: cystoscopy    Patient tolerance: Patient tolerated the procedure well with no immediate complications    Additional Procedure Details:  The patient was placed in the male lithotomy position his urethral meatus prepped with Betadine and 2% lidocaine lubricant instilled per urethra left indwelling for 10 min and flexible cystoscopy took place revealing a normal urethra without stricture lesion mild bilobar enlargement of the lateral lobes of the prostate with mild visual occlusion of the bladder outlet  The urinary bladder was moderately trabeculated with to diverticula that were easy to visualize in their entirety without any evidence of lesions in those diverticula or elsewhere in the bladder although there were 2 sites that appeared to be healing from previous biopsy 1 on the right base/posterior wall and 1 on the left lateral wall of the bladder without visible tumor  Ureteral orifices were normal position and configuration bilaterally with clear efflux bilaterally  There was no evidence of extrinsic mass compression of the urinary bladder either    The patient tolerated the procedure well without complication and is cleared to re-enter BCG maintenance therapy

## 2018-04-17 NOTE — PROGRESS NOTES
Assessment/Plan:  1  Bladder cancer-the patient has had low-grade noninvasive papillary bladder tumors with multiple recurrences over a 6 year span initially starting with presentation of gross hematuria  The patient presents for surveillance cystoscopy and is about to enter the maintenance phase of intravesical BCG therapy  He has had no complaints in that regard  He will continue with q 3 months intravesical BCG therapy weekly treatments x3 over the next 2 years  2   History of renal carcinoma  The patient is approximately 6 years status post right partial nephrectomy for renal cell carcinoma with multiple cysts in both kidneys  Staging has shown no evidence of disease  3   BPH without obstruction  The patient is due for a PSA    No problem-specific Assessment & Plan notes found for this encounter  Diagnoses and all orders for this visit:    Malignant neoplasm of overlapping sites of bladder (HCC)  -     POCT urine dip auto non-scope  -     BCG ([facility] BCG) intravesical suspension 50 mg; Instill 2 mL (50 mg total) into the bladder once a week   -     PSA Total, Diagnostic; Future  -     Comprehensive metabolic panel; Future  -     Cystoscopy; Future          Subjective:      Patient ID: Tamra Higgins is a 79 y o  male  HPI 26-year-old male followed for low-grade low stage recurrent bladder cancer and a history of renal cell carcinoma status post right partial nephrectomy in the past   The patient presents for further discussion as to maintenance BCG therapy and surveillance cystoscopy  The following portions of the patient's history were reviewed and updated as appropriate: allergies, current medications, past family history, past medical history, past social history, past surgical history and problem list     Review of Systems   Constitutional: Negative  HENT: Positive for hearing loss  Eyes: Negative  Respiratory: Negative  Cardiovascular: Negative  Gastrointestinal: Negative  Genitourinary: Negative  Musculoskeletal: Negative  Neurological: Negative  Hematological: Negative  Psychiatric/Behavioral: Negative  Objective:      /90   Ht 6' 2" (1 88 m)   Wt 119 kg (262 lb)   BMI 33 64 kg/m²          Physical Exam   Constitutional: He is oriented to person, place, and time  He appears well-developed and well-nourished  HENT:   Head: Atraumatic  Eyes: EOM are normal    Neck: Neck supple  Pulmonary/Chest: Effort normal  No respiratory distress  Abdominal: Soft  Bowel sounds are normal  He exhibits no distension and no mass  There is no tenderness  There is no rebound and no guarding  Genitourinary: Penis normal    Musculoskeletal: Normal range of motion  Neurological: He is alert and oriented to person, place, and time  Psychiatric: He has a normal mood and affect  His behavior is normal  Judgment and thought content normal          Cystoscopy  Date/Time: 4/17/2018 11:23 AM  Performed by: Dionne Garcia by: Lata Delgadillo     Procedure details: cystoscopy    Patient tolerance: Patient tolerated the procedure well with no immediate complications    Additional Procedure Details: The patient was placed in the male lithotomy position his urethral meatus prepped with Betadine and 2% lidocaine lubricant instilled per urethra left indwelling for 10 min and flexible cystoscopy took place revealing a normal urethra without stricture lesion mild bilobar enlargement of the lateral lobes of the prostate with mild visual occlusion of the bladder outlet  The urinary bladder was moderately trabeculated with to diverticula that were easy to visualize in their entirety without any evidence of lesions in those diverticula or elsewhere in the bladder although there were 2 sites that appeared to be healing from previous biopsy 1 on the right base/posterior wall and 1 on the left lateral wall of the bladder without visible tumor    Ureteral orifices were normal position and configuration bilaterally with clear efflux bilaterally  There was no evidence of extrinsic mass compression of the urinary bladder either    The patient tolerated the procedure well without complication and is cleared to re-enter BCG maintenance therapy

## 2018-05-25 ENCOUNTER — APPOINTMENT (OUTPATIENT)
Dept: LAB | Facility: CLINIC | Age: 68
End: 2018-05-25
Payer: MEDICARE

## 2018-05-25 ENCOUNTER — TRANSCRIBE ORDERS (OUTPATIENT)
Dept: LAB | Facility: CLINIC | Age: 68
End: 2018-05-25

## 2018-05-25 DIAGNOSIS — Z00.00 HEALTHCARE MAINTENANCE: ICD-10-CM

## 2018-05-25 PROCEDURE — 87077 CULTURE AEROBIC IDENTIFY: CPT

## 2018-05-25 PROCEDURE — 87186 SC STD MICRODIL/AGAR DIL: CPT

## 2018-05-25 PROCEDURE — 87086 URINE CULTURE/COLONY COUNT: CPT

## 2018-05-25 PROCEDURE — 87147 CULTURE TYPE IMMUNOLOGIC: CPT

## 2018-05-27 LAB — BACTERIA UR CULT: ABNORMAL

## 2018-05-28 DIAGNOSIS — N30.00 ACUTE CYSTITIS WITHOUT HEMATURIA: Primary | ICD-10-CM

## 2018-05-28 RX ORDER — CEPHALEXIN 250 MG/1
500 CAPSULE ORAL 4 TIMES DAILY
Qty: 28 CAPSULE | Refills: 0 | Status: SHIPPED | OUTPATIENT
Start: 2018-05-28 | End: 2018-06-04

## 2018-05-29 ENCOUNTER — TELEPHONE (OUTPATIENT)
Dept: UROLOGY | Facility: MEDICAL CENTER | Age: 68
End: 2018-05-29

## 2018-05-29 RX ORDER — CEPHALEXIN 250 MG/1
250 CAPSULE ORAL 4 TIMES DAILY
COMMUNITY
Start: 2018-05-29 | End: 2018-06-04

## 2018-05-29 NOTE — TELEPHONE ENCOUNTER
Clarified prescription For Cephalexin with Dr Maren Romero  Cephalexin 250 mg QID x's 7 days  Spoke to Josias Ambrocio Ph  Pt notified  Will need to hold first 2 treatment until culture comes back negative

## 2018-06-05 ENCOUNTER — APPOINTMENT (OUTPATIENT)
Dept: LAB | Facility: CLINIC | Age: 68
End: 2018-06-05
Payer: MEDICARE

## 2018-06-05 DIAGNOSIS — N30.00 ACUTE CYSTITIS WITHOUT HEMATURIA: Primary | ICD-10-CM

## 2018-06-05 DIAGNOSIS — N30.00 ACUTE CYSTITIS WITHOUT HEMATURIA: ICD-10-CM

## 2018-06-05 PROCEDURE — 87086 URINE CULTURE/COLONY COUNT: CPT

## 2018-06-05 NOTE — TELEPHONE ENCOUNTER
Pt finished course of antibiotic 6/4/18 he presented to [facility] this morning for repeat urine culture was informed no order in system he did leave specimen and was instructed to have order entered into system,he also states results were needed to be in by 6/8/18 for treatments, questions call pt home #

## 2018-06-05 NOTE — TELEPHONE ENCOUNTER
Put C&S script in the system called and spoke to Nilam at [facility] lab services @ [phone] to let her know script was in

## 2018-06-06 LAB — BACTERIA UR CULT: NORMAL

## 2018-06-14 ENCOUNTER — TELEPHONE (OUTPATIENT)
Dept: UROLOGY | Facility: AMBULATORY SURGERY CENTER | Age: 68
End: 2018-06-14

## 2018-06-14 NOTE — TELEPHONE ENCOUNTER
Spoke with Shreyas Schroeder from [place] [address], patient has an appointment on Monday and they need new orders faxed over  Fax number is [phone]

## 2018-06-18 ENCOUNTER — HOSPITAL ENCOUNTER (OUTPATIENT)
Dept: INFUSION CENTER | Facility: CLINIC | Age: 68
Discharge: HOME | End: 2018-06-18
Payer: MEDICARE

## 2018-06-18 VITALS
SYSTOLIC BLOOD PRESSURE: 130 MMHG | TEMPERATURE: 97.8 F | DIASTOLIC BLOOD PRESSURE: 85 MMHG | HEART RATE: 84 BPM | RESPIRATION RATE: 16 BRPM

## 2018-06-18 PROCEDURE — 51720 TREATMENT OF BLADDER LESION: CPT

## 2018-06-18 RX ADMIN — SODIUM CHLORIDE 50 MG: 9 INJECTION, SOLUTION INTRAVENOUS at 11:13

## 2018-06-18 NOTE — PLAN OF CARE

## 2018-06-18 NOTE — PROGRESS NOTES
Patient tolerated last chemo treatment well  He was able to hold the instillation for the full 2 hours  Patient tolerated today's treatment well  Discharge instructions given

## 2018-07-11 ENCOUNTER — APPOINTMENT (OUTPATIENT)
Dept: LAB | Facility: CLINIC | Age: 68
End: 2018-07-11
Payer: MEDICARE

## 2018-07-11 ENCOUNTER — TRANSCRIBE ORDERS (OUTPATIENT)
Dept: LAB | Facility: CLINIC | Age: 68
End: 2018-07-11

## 2018-07-11 DIAGNOSIS — C67.8 MALIGNANT NEOPLASM OF OVERLAPPING SITES OF BLADDER (HCC): ICD-10-CM

## 2018-07-11 LAB
ALBUMIN SERPL BCP-MCNC: 3.8 G/DL (ref 3.5–5)
ALP SERPL-CCNC: 114 U/L (ref 46–116)
ALT SERPL W P-5'-P-CCNC: 23 U/L (ref 12–78)
ANION GAP SERPL CALCULATED.3IONS-SCNC: 6 MMOL/L (ref 4–13)
AST SERPL W P-5'-P-CCNC: 17 U/L (ref 5–45)
BILIRUB SERPL-MCNC: 0.31 MG/DL (ref 0.2–1)
BUN SERPL-MCNC: 21 MG/DL (ref 5–25)
CALCIUM SERPL-MCNC: 9.1 MG/DL (ref 8.3–10.1)
CHLORIDE SERPL-SCNC: 104 MMOL/L (ref 100–108)
CO2 SERPL-SCNC: 28 MMOL/L (ref 21–32)
CREAT SERPL-MCNC: 1.2 MG/DL (ref 0.6–1.3)
GFR SERPL CREATININE-BSD FRML MDRD: 62 ML/MIN/1.73SQ M
GLUCOSE P FAST SERPL-MCNC: 126 MG/DL (ref 65–99)
POTASSIUM SERPL-SCNC: 4.2 MMOL/L (ref 3.5–5.3)
PROT SERPL-MCNC: 7.8 G/DL (ref 6.4–8.2)
PSA SERPL-MCNC: 3.4 NG/ML (ref 0–4)
SODIUM SERPL-SCNC: 138 MMOL/L (ref 136–145)

## 2018-07-11 PROCEDURE — 84153 ASSAY OF PSA TOTAL: CPT

## 2018-07-11 PROCEDURE — 36415 COLL VENOUS BLD VENIPUNCTURE: CPT

## 2018-07-11 PROCEDURE — 80053 COMPREHEN METABOLIC PANEL: CPT

## 2018-07-25 ENCOUNTER — PROCEDURE VISIT (OUTPATIENT)
Dept: UROLOGY | Facility: MEDICAL CENTER | Age: 68
End: 2018-07-25
Payer: MEDICARE

## 2018-07-25 VITALS
DIASTOLIC BLOOD PRESSURE: 80 MMHG | WEIGHT: 275 LBS | HEIGHT: 75 IN | SYSTOLIC BLOOD PRESSURE: 130 MMHG | BODY MASS INDEX: 34.19 KG/M2

## 2018-07-25 DIAGNOSIS — Z85.51 HISTORY OF BLADDER CANCER: Primary | ICD-10-CM

## 2018-07-25 DIAGNOSIS — N13.8 BPH WITH OBSTRUCTION/LOWER URINARY TRACT SYMPTOMS: ICD-10-CM

## 2018-07-25 DIAGNOSIS — Z85.528 HISTORY OF RENAL CELL CANCER: ICD-10-CM

## 2018-07-25 DIAGNOSIS — N40.1 BPH WITH OBSTRUCTION/LOWER URINARY TRACT SYMPTOMS: ICD-10-CM

## 2018-07-25 LAB
SL AMB  POCT GLUCOSE, UA: ABNORMAL
SL AMB LEUKOCYTE ESTERASE,UA: ABNORMAL
SL AMB POCT BILIRUBIN,UA: ABNORMAL
SL AMB POCT BLOOD,UA: ABNORMAL
SL AMB POCT CLARITY,UA: CLEAR
SL AMB POCT COLOR,UA: YELLOW
SL AMB POCT KETONES,UA: ABNORMAL
SL AMB POCT NITRITE,UA: ABNORMAL
SL AMB POCT PH,UA: 6.5
SL AMB POCT SPECIFIC GRAVITY,UA: 1.02
SL AMB POCT URINE PROTEIN: ABNORMAL
SL AMB POCT UROBILINOGEN: 0.2

## 2018-07-25 PROCEDURE — 87147 CULTURE TYPE IMMUNOLOGIC: CPT | Performed by: UROLOGY

## 2018-07-25 PROCEDURE — 81003 URINALYSIS AUTO W/O SCOPE: CPT | Performed by: UROLOGY

## 2018-07-25 PROCEDURE — 52000 CYSTOURETHROSCOPY: CPT | Performed by: UROLOGY

## 2018-07-25 PROCEDURE — 87086 URINE CULTURE/COLONY COUNT: CPT | Performed by: UROLOGY

## 2018-07-25 PROCEDURE — 87077 CULTURE AEROBIC IDENTIFY: CPT | Performed by: UROLOGY

## 2018-07-25 PROCEDURE — 87186 SC STD MICRODIL/AGAR DIL: CPT | Performed by: UROLOGY

## 2018-07-25 RX ORDER — VIT B12/INTRINSIC FACT/FOLATE 500-20-800
TABLET ORAL
COMMUNITY

## 2018-07-25 RX ORDER — CIPROFLOXACIN 500 MG/1
500 TABLET, FILM COATED ORAL EVERY 12 HOURS SCHEDULED
Qty: 4 TABLET | Refills: 0 | Status: SHIPPED | OUTPATIENT
Start: 2018-07-25 | End: 2018-07-27

## 2018-07-25 NOTE — PROGRESS NOTES
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 the 1st being in 2012 then another in 2014 and then another in 2017  The patient began BCG therapy in 2018 and received 6 weekly instillations as an inductive course and 1 installation out of 3 planned for his 1st maintenance session  The patient however developed a urinary tract infection requiring treatment with antibiotics and did not receive the 1st 2 out of those 3 instillations but did receive the last 1  The patient now presents today for cystoscopy and planning of future intravesical BCG therapy  2   History of renal cancer  The patient underwent right partial nephrectomy in 2013 for a renal cell carcinoma [place] grade 2 of 4 without any evidence of extension  The patient has been free of disease since on follow-up CT however there are some pulmonary nodules as well as hyperdense cysts bilaterally that are also being followed but do not appear to be related to his renal cancer  The patient will be due for repeat CT and associated laboratory work in 6 months  3   BPH with bladder outlet obstructive symptoms  The patient remains on Uroxatral 10 mg p o  daily and notes a good urinary stream without obstructive irritative symptoms  He also denies gross hematuria recently  Diagnoses and all orders for this visit:    History of bladder cancer  -     POCT urine dip auto non-scope    History of renal cell cancer    BPH with obstruction/lower urinary tract symptoms  -     Urine culture; Future  -     ciprofloxacin (CIPRO) 500 mg tablet; Take 1 tablet (500 mg total) by mouth every 12 (twelve) hours for 2 days    Other orders  -     Folate-B12-Intrinsic Factor (INTRINSI F47-[id number]) [id number] MCG-MCG-MG TABS; Take by oral route  Subjective:     Patient ID: Haylee Rangel is a 79 y o  male      HPI    Review of Systems   Constitutional: Negative  HENT: Negative  Eyes: Negative  Respiratory: Negative  Cardiovascular: Negative  Gastrointestinal: Negative  Endocrine: Negative  Genitourinary: Negative  Musculoskeletal: Positive for arthralgias  Neurological: Negative  Hematological: Negative  Psychiatric/Behavioral: Negative  Objective:     Physical Exam   Constitutional: He is oriented to person, place, and time  He appears well-developed and well-nourished  HENT:   Head: Normocephalic  Eyes: Conjunctivae and EOM are normal    Neck: Neck supple  Pulmonary/Chest: Effort normal and breath sounds normal    Abdominal: Soft  Bowel sounds are normal  He exhibits no distension  There is no tenderness  There is no rebound  Genitourinary: Penis normal    Neurological: He is alert and oriented to person, place, and time  Psychiatric: He has a normal mood and affect  His behavior is normal  Judgment and thought content normal    Vitals reviewed  Cystoscopy  Date/Time: 7/25/2018 9:11 AM  Performed by: Lata Delgadillo  Authorized by: Lata Delgadillo     Procedure details: cystoscopy    Patient tolerance: Patient tolerated the procedure well with no immediate complications    Additional Procedure Details: The patient was placed in the lithotomy position and his meatus prepped with Betadine and 2% lidocaine lubricant instilled into the urethra and left indwelling for 2 minutes  A flexible cystoscope under sterile conditions was introduced per urethra  The urethra was visualized and found to be within normal limits except for a stricture at the penoscrotal junction which did allow passage of the scope without need for dilation  A similar passable even milder stricture was noted in the deep bulb but was also nonobstructive  The remainder the urethra was normal   The prostatic urethra revealed bilobar enlargement of the lateral lobes of the prostate with visual occlusion of the bladder outlet    The urinary bladder was free of any intrinsic lesions or evidence of extrinsic mass compression without evidence of recurrent carcinoma  Bladder was mildly trabeculated  The left lateral wall wide mouthed diverticulum was also visualized in the entirety and there was no evidence of lesion within that either  Both ureteral orifices were normal position and configuration bilaterally with clear efflux bilaterally  Patient tolerated the procedure well  There were no complications

## 2018-07-25 NOTE — LETTER
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 the 1st being in 2012 then another in 2014 and then another in 2017  The patient began BCG therapy in 2018 and received 6 weekly instillations as an inductive course and 1 installation out of 3 planned for his 1st maintenance session  The patient however developed a urinary tract infection requiring treatment with antibiotics and did not receive the 1st 2 out of those 3 instillations but did receive the last 1  The patient now presents today for cystoscopy and planning of future intravesical BCG therapy  2   History of renal cancer  The patient underwent right partial nephrectomy in 2013 for a renal cell carcinoma [place] grade 2 of 4 without any evidence of extension  The patient has been free of disease since on follow-up CT however there are some pulmonary nodules as well as hyperdense cysts bilaterally that are also being followed but do not appear to be related to his renal cancer  The patient will be due for repeat CT and associated laboratory work in 6 months  3   BPH with bladder outlet obstructive symptoms  The patient remains on Uroxatral 10 mg p o  daily and notes a good urinary stream without obstructive irritative symptoms  He also denies gross hematuria recently     Diagnoses and all orders for this visit:    History of bladder cancer  -     POCT urine dip auto non-scope    History of renal cell cancer    BPH with obstruction/lower urinary tract symptoms  -     Urine culture; Future  -     ciprofloxacin (CIPRO) 500 mg tablet; Take 1 tablet (500 mg total) by mouth every 12 (twelve) hours for 2 days    Other orders  -     Folate-B12-Intrinsic Factor (INTRINSI [id number]) [id number] MCG-MCG-MG TABS; Take by oral route  Subjective:     Patient ID: Stevenson Linton is a 79 y o  male  HPI    Review of Systems   Constitutional: Negative  HENT: Negative  Eyes: Negative  Respiratory: Negative  Cardiovascular: Negative  Gastrointestinal: Negative  Endocrine: Negative  Genitourinary: Negative  Musculoskeletal: Positive for arthralgias  Neurological: Negative  Hematological: Negative  Psychiatric/Behavioral: Negative  Objective:     Physical Exam   Constitutional: He is oriented to person, place, and time  He appears well-developed and well-nourished  HENT:   Head: Normocephalic  Eyes: Conjunctivae and EOM are normal    Neck: Neck supple  Pulmonary/Chest: Effort normal and breath sounds normal    Abdominal: Soft  Bowel sounds are normal  He exhibits no distension  There is no tenderness  There is no rebound  Genitourinary: Penis normal    Neurological: He is alert and oriented to person, place, and time  Psychiatric: He has a normal mood and affect  His behavior is normal  Judgment and thought content normal    Vitals reviewed  Cystoscopy  Date/Time: 7/25/2018 9:11 AM  Performed by: Francoise Rosado  Authorized by: Francoise Rosado     Procedure details: cystoscopy    Patient tolerance: Patient tolerated the procedure well with no immediate complications    Additional Procedure Details:  The patient was placed in the lithotomy position and his meatus prepped with Betadine and 2% lidocaine lubricant instilled into the urethra and left indwelling for 2 minutes  A flexible cystoscope under sterile conditions was introduced per urethra  The urethra was visualized and found to be within normal limits except for a stricture at the penoscrotal junction which did allow passage of the scope without need for dilation  A similar passable even milder stricture was noted in the deep bulb but was also nonobstructive  The remainder the urethra was normal   The prostatic urethra revealed bilobar enlargement of the lateral lobes of the prostate with visual occlusion of the bladder outlet  The urinary bladder was free of any intrinsic lesions or evidence of extrinsic mass compression without evidence of recurrent carcinoma  Bladder was mildly trabeculated  The left lateral wall wide mouthed diverticulum was also visualized in the entirety and there was no evidence of lesion within that either  Both ureteral orifices were normal position and configuration bilaterally with clear efflux bilaterally  Patient tolerated the procedure well  There were no complications

## 2018-07-27 DIAGNOSIS — N30.00 ACUTE CYSTITIS WITHOUT HEMATURIA: Primary | ICD-10-CM

## 2018-07-27 LAB — BACTERIA UR CULT: ABNORMAL

## 2018-07-27 RX ORDER — DOXYCYCLINE HYCLATE 100 MG/1
100 CAPSULE ORAL EVERY 12 HOURS SCHEDULED
Qty: 14 CAPSULE | Refills: 0 | Status: SHIPPED | OUTPATIENT
Start: 2018-07-27 | End: 2018-08-03

## 2018-07-30 ENCOUNTER — TELEPHONE (OUTPATIENT)
Dept: UROLOGY | Facility: MEDICAL CENTER | Age: 68
End: 2018-07-30

## 2018-07-30 DIAGNOSIS — N30.90 CYSTITIS: Primary | ICD-10-CM

## 2018-07-30 NOTE — TELEPHONE ENCOUNTER
Pt set for BCG x's 3 starting Aug 21st, 28th and Sept 4th 9 am  Pt to go for culture 8/15  Order in [facility]

## 2018-08-15 ENCOUNTER — APPOINTMENT (OUTPATIENT)
Dept: LAB | Facility: CLINIC | Age: 68
End: 2018-08-15
Payer: MEDICARE

## 2018-08-15 ENCOUNTER — TRANSCRIBE ORDERS (OUTPATIENT)
Dept: LAB | Facility: CLINIC | Age: 68
End: 2018-08-15

## 2018-08-15 DIAGNOSIS — N30.90 CYSTITIS: ICD-10-CM

## 2018-08-15 PROCEDURE — 87086 URINE CULTURE/COLONY COUNT: CPT

## 2018-08-16 LAB — BACTERIA UR CULT: NORMAL

## 2018-08-21 ENCOUNTER — HOSPITAL ENCOUNTER (OUTPATIENT)
Dept: INFUSION CENTER | Facility: CLINIC | Age: 68
Discharge: HOME | End: 2018-08-21
Payer: MEDICARE

## 2018-08-21 VITALS
RESPIRATION RATE: 16 BRPM | HEART RATE: 76 BPM | DIASTOLIC BLOOD PRESSURE: 76 MMHG | TEMPERATURE: 97.7 F | SYSTOLIC BLOOD PRESSURE: 128 MMHG

## 2018-08-21 PROCEDURE — 51720 TREATMENT OF BLADDER LESION: CPT

## 2018-08-21 RX ADMIN — SODIUM CHLORIDE 50 MG: 9 INJECTION, SOLUTION INTRAVENOUS at 10:03

## 2018-08-21 NOTE — PROGRESS NOTES
Pt  Denies new symptoms or concerns at this time  BCG ordered today  Pt  Will be leaving post instillation  Written and verbal instructions given to patient related to use of bathroom following urination  Pt understands to hold urine turning side to side and front to back to circulate medication  Pt  Verbalized understanding of instructions  15 F catheter inserted into bladder without difficulty

## 2018-08-21 NOTE — PLAN OF CARE
Problem: PAIN - ADULT  Goal: Verbalizes/displays adequate comfort level or baseline comfort level  Interventions:  - Encourage patient to monitor pain and request assistance  - Assess pain using appropriate pain scale  - Administer analgesics based on type and severity of pain and evaluate response  - Implement non-pharmacological measures as appropriate and evaluate response  - Consider cultural and social influences on pain and pain management  - Notify physician/advanced practitioner if interventions unsuccessful or patient reports new pain  Outcome: Progressing      Problem: INFECTION - ADULT  Goal: Absence or prevention of progression during hospitalization  INTERVENTIONS:  - Assess and monitor for signs and symptoms of infection  - Monitor lab/diagnostic results  - Monitor all insertion sites, i e  indwelling lines, tubes, and drains  - Monitor endotracheal (as able) and nasal secretions for changes in amount and color  - Bowman appropriate cooling/warming therapies per order  - Administer medications as ordered  - Instruct and encourage patient and family to use good hand hygiene technique  - Identify and instruct in appropriate isolation precautions for identified infection/condition  Outcome: Progressing    Goal: Absence of fever/infection during neutropenic period  INTERVENTIONS:  - Monitor WBC  - Implement neutropenic guidelines  Outcome: Progressing      Problem: Knowledge Deficit  Goal: Patient/family/caregiver demonstrates understanding of disease process, treatment plan, medications, and discharge instructions  Complete learning assessment and assess knowledge base    Interventions:  - Provide teaching at level of understanding  - Provide teaching via preferred learning methods  Outcome: Not Progressing

## 2018-08-21 NOTE — PROGRESS NOTES
BCG inserted without difficulty  Blood tinged urine noted  Pt  Denies pain or burning at this time  Pt  Instructed to call physician with any concerns  Catheter removed without difficulty    Declined AVS

## 2018-08-28 ENCOUNTER — HOSPITAL ENCOUNTER (OUTPATIENT)
Dept: INFUSION CENTER | Facility: CLINIC | Age: 68
Discharge: HOME | End: 2018-08-28
Payer: MEDICARE

## 2018-08-28 VITALS
HEART RATE: 80 BPM | DIASTOLIC BLOOD PRESSURE: 88 MMHG | RESPIRATION RATE: 20 BRPM | TEMPERATURE: 97.5 F | SYSTOLIC BLOOD PRESSURE: 158 MMHG

## 2018-08-28 PROCEDURE — 51720 TREATMENT OF BLADDER LESION: CPT

## 2018-08-28 RX ADMIN — SODIUM CHLORIDE 50 MG: 9 INJECTION, SOLUTION INTRAVENOUS at 09:45

## 2018-08-28 NOTE — PROGRESS NOTES
BCG instilled intravesically as ordered  Miguel catheter removed without difficulty  Pt understands to turn and reposition at home and to hold urine for a total of 2 hours  Pt declined AVS but is aware of his appt next week

## 2018-08-28 NOTE — PROGRESS NOTES
Pt here for bladder chemotherapy today  Pt states last time he was here there was some blood in his urine after catheter insertion and some blood upon urination at home  No symptoms noted at this time    Pt asking for smallest catheter

## 2018-09-04 ENCOUNTER — HOSPITAL ENCOUNTER (OUTPATIENT)
Dept: INFUSION CENTER | Facility: CLINIC | Age: 68
Discharge: HOME | End: 2018-09-04
Payer: MEDICARE

## 2018-09-04 VITALS
SYSTOLIC BLOOD PRESSURE: 121 MMHG | RESPIRATION RATE: 20 BRPM | TEMPERATURE: 97.5 F | DIASTOLIC BLOOD PRESSURE: 80 MMHG | HEART RATE: 75 BPM

## 2018-09-04 PROCEDURE — 51720 TREATMENT OF BLADDER LESION: CPT

## 2018-09-04 RX ADMIN — SODIUM CHLORIDE 50 MG: 9 INJECTION, SOLUTION INTRAVENOUS at 09:47

## 2018-09-04 NOTE — PROGRESS NOTES
BCG 50mg instilled via yuen as ordered  Yuen removed, patient to dwell x2 hours at home, discharged to home

## 2018-11-08 ENCOUNTER — PROCEDURE VISIT (OUTPATIENT)
Dept: UROLOGY | Facility: MEDICAL CENTER | Age: 68
End: 2018-11-08
Payer: MEDICARE

## 2018-11-08 VITALS — BODY MASS INDEX: 32.95 KG/M2 | HEIGHT: 75 IN | WEIGHT: 265 LBS

## 2018-11-08 DIAGNOSIS — Z85.528 HISTORY OF RENAL CELL CANCER: ICD-10-CM

## 2018-11-08 DIAGNOSIS — N40.1 BENIGN PROSTATIC HYPERPLASIA WITH LOWER URINARY TRACT SYMPTOMS, SYMPTOM DETAILS UNSPECIFIED: ICD-10-CM

## 2018-11-08 DIAGNOSIS — Z85.51 HISTORY OF BLADDER CANCER: Primary | ICD-10-CM

## 2018-11-08 LAB
SL AMB  POCT GLUCOSE, UA: ABNORMAL
SL AMB LEUKOCYTE ESTERASE,UA: ABNORMAL
SL AMB POCT BILIRUBIN,UA: ABNORMAL
SL AMB POCT BLOOD,UA: ABNORMAL
SL AMB POCT CLARITY,UA: ABNORMAL
SL AMB POCT COLOR,UA: ABNORMAL
SL AMB POCT KETONES,UA: ABNORMAL
SL AMB POCT NITRITE,UA: ABNORMAL
SL AMB POCT PH,UA: 6
SL AMB POCT SPECIFIC GRAVITY,UA: 1.02
SL AMB POCT URINE PROTEIN: ABNORMAL
SL AMB POCT UROBILINOGEN: 0.2

## 2018-11-08 PROCEDURE — 99214 OFFICE O/P EST MOD 30 MIN: CPT | Performed by: UROLOGY

## 2018-11-08 PROCEDURE — 81003 URINALYSIS AUTO W/O SCOPE: CPT | Performed by: UROLOGY

## 2018-11-08 PROCEDURE — 52000 CYSTOURETHROSCOPY: CPT | Performed by: UROLOGY

## 2018-11-08 RX ORDER — CIPROFLOXACIN 500 MG/1
500 TABLET, FILM COATED ORAL EVERY 12 HOURS SCHEDULED
Qty: 4 TABLET | Refills: 0 | Status: SHIPPED | OUTPATIENT
Start: 2018-11-08 | End: 2018-11-10

## 2018-11-08 NOTE — PROGRESS NOTES
H&P Exam - Urology   Jazz Patel 76 y o  male MRN: [id number]  Unit/Bed#:  Encounter: [id number]    Assessment/Plan     Assessment:  Recurrent bladder cancer  BPH with bladder outlet obstruction  History of renal cell carcinoma  Plan:  Cystoscopy TURBT    History of Present Illness   HPI:  Jazz Patel is a 76 y o  male who presents with low-grade low stage recurrent bladder cancer  The patient also has a history of renal cell carcinoma status post right partial nephrectomy in 2013  The patient has been receiving BCG therapy receiving a 6 week inductive course and a 3 week maintenance course and now return to this office for cystourethroscopy  A in area of irregular tissue was identified posterior to the bladder trigone to the right of the midline on the posterior wall of the bladder and the patient will now present for TURBT  The patient currently is on alpha blockers for management of his BPH however on cystourethroscopy there is visual occlusion of the bladder outlet with TURP or Uro lift to be considered in the future  There is no evidence of recurrence of his renal cell carcinoma on recent imaging  Review of Systems   Constitutional: Positive for fatigue  HENT: Negative  Respiratory: Negative  Cardiovascular: Negative  Gastrointestinal: Negative  Genitourinary: Positive for frequency and urgency  See HPI   Musculoskeletal: Positive for arthralgias and myalgias  Neurological: Negative  Hematological: Negative  Psychiatric/Behavioral: Negative          Historical Information   Past Medical History:   Diagnosis Date    Arthritis     BPH (benign prostatic hyperplasia)     Full dentures     Gross hematuria     Hearing loss     [place]hard of hearing[place]     right ear    Hyperlipidemia     Hypothyroid     Lesion of bladder     Low back pain     Malignant neoplasm of overlapping sites of bladder ([address] )     Malignant neoplasm of right kidney, except renal pelvis ([address] )  Prediabetes     Renal cyst     Right renal mass     Seizures ([address] )     last seizure 14 yrs ago    Wears glasses     reading     Past Surgical History:   Procedure Laterality Date    COLONOSCOPY      CYSTOSCOPY  2012, 2013, 2014    CYSTOSCOPY N/A 12/22/2017    Procedure: INTRAVESICAL MITOMYCIN;  Surgeon: Deshaun Prajapati MD;  [facility];  Service: Urology    CYSTOSCOPY W/ RETROGRADES Bilateral 2012    PARTIAL NEPHRECTOMY Right 2013    [facility] CYSTOURETHROSCOPY,FULGUR 2-5 CM LESN N/A 12/22/2017    Procedure: TRANSURETHRAL RESECTION OF BLADDER TUMOR (TURBT); Surgeon: Deshaun Prajapati MD;  [facility];  Service: Urology    RENAL CYST EXCISION      questionable malignancy    TRANSURETHRAL RESECTION OF PROSTATE  2012     Social History   History   Alcohol Use No     Comment: Former drinker  History   Drug Use No     History   Smoking Status    Former Smoker    Packs/day: 2 00    Years: 40 00    Types: Cigarettes    Quit date: 12/12/2014   Smokeless Tobacco    Never Used     Family History:   Family History   Problem Relation Age of Onset    Diabetes Family        Meds/Allergies   all medications and allergies reviewed  Allergies   Allergen Reactions    Bactrim [Sulfamethoxazole-Trimethoprim] Itching, Hives and Rash     Rash, itching       Objective   Vitals: Height 6' 3" (1 905 m), weight 120 kg (265 lb)  [unfilled]    Invasive Devices     Drain            Urethral Catheter 20 Fr  321 days                Physical Exam   Constitutional: He is oriented to person, place, and time  He appears well-developed and well-nourished  No distress  HENT:   Head: Normocephalic and atraumatic  Eyes: Conjunctivae and EOM are normal    Neck: Neck supple  Cardiovascular: Normal rate, regular rhythm and normal heart sounds  No murmur heard  Pulmonary/Chest: Effort normal and breath sounds normal  No respiratory distress  He has no wheezes  He has no rales   He exhibits no tenderness  Abdominal: Soft  Bowel sounds are normal  He exhibits no distension and no mass  There is no tenderness  There is no rebound and no guarding  Genitourinary: Penis normal    Neurological: He is alert and oriented to person, place, and time  Psychiatric: He has a normal mood and affect  His behavior is normal  Judgment and thought content normal    Vitals reviewed  Lab Results: I have personally reviewed pertinent reports  Imaging:  Undergoing a  EKG, Pathology, and Other Studies: I have personally reviewed pertinent reports  VTE Prophylaxis: Sequential compression device Purvi Rotunda)     Code Status: [unfilled]  Advance Directive and Living Will:      Power of : Yes  POLST:      Counseling / Coordination of Care  Total floor / unit time spent today 30 minutes  Greater than 50% of total time was spent with the patient and / or family counseling and / or coordination of care  A description of the counseling / coordination of care: Brando Kim Cystoscopy  Date/Time: 11/8/2018 2:09 PM  Performed by: Esperanza Sauer  Authorized by: Esperanza Sauer     Procedure details: cystoscopy    Patient tolerance: Patient tolerated the procedure well with no immediate complications    Additional Procedure Details: The patient was placed in lithotomy position and his meatus prepped with Betadine and 2% lidocaine lubricant injected into the lumen of the urethra  This was left indwelling for 3 min the patient then underwent cystourethroscopy which revealed multiple areas of stricture in the anterior urethra both the pendulous penoscrotal and bulbar regions all of which were not obstructive to the scope  The prostatic urethra revealed bilobar enlargement of the lateral lobes of the prostate with visual occlusion to the bladder outlet  This would be amenable to TURP laser TURP or Uro left  There was no median lobe enlargement identified    The urinary bladder was free of any intrinsic lesions or extrinsic mass compression with moderate trabeculation identified  A bladder diverticulum on the left lateral wall of the bladder was easily visualized with the cystoscope was free of any intrinsic lesions  Ureteral orifices were normal position and configuration bilaterally with clear effluxBilaterally  On the posterior wall of the bladder just to the right of the midline was an irregular area of papillations identified approximately 3 cm in diameter  This area has appearance of a papillary bladder tumor that is by visualization at least not invasive  TURBT will be recommended  The patient tolerated the procedure well after removal of the cystoscope

## 2018-11-08 NOTE — H&P
Procedure visit  Open      11/8/2018  [facility] Jailyn Hennessy MD   Urology   History of bladder cancer +2 more   Dx   Cystoscopy, Bladder Cancer   Reason for Visit    Progress Notes   Unsigned   Procedure Orders:   1  Cystoscopy [[id number]] ordered by Aliyah Ordaz MD at 11/08/18 1409   Expand All Collapse All    H&P Exam - Urology   Jc Ahumada 76 y o  male MRN: [id number]  Unit/Bed#:  Encounter: [id number]     Assessment/Plan      Assessment:  Recurrent bladder cancer  BPH with bladder outlet obstruction  History of renal cell carcinoma  Plan:  Cystoscopy TURBT     History of Present Illness         HPI:  Jc Ahumada is a 76 y o  male who presents with low-grade low stage recurrent bladder cancer  The patient also has a history of renal cell carcinoma status post right partial nephrectomy in 2013  The patient has been receiving BCG therapy receiving a 6 week inductive course and a 3 week maintenance course and now return to this office for cystourethroscopy  A in area of irregular tissue was identified posterior to the bladder trigone to the right of the midline on the posterior wall of the bladder and the patient will now present for TURBT  The patient currently is on alpha blockers for management of his BPH however on cystourethroscopy there is visual occlusion of the bladder outlet with TURP or Uro lift to be considered in the future  There is no evidence of recurrence of his renal cell carcinoma on recent imaging      Review of Systems   Constitutional: Positive for fatigue  HENT: Negative  Respiratory: Negative  Cardiovascular: Negative  Gastrointestinal: Negative  Genitourinary: Positive for frequency and urgency  See HPI   Musculoskeletal: Positive for arthralgias and myalgias  Neurological: Negative  Hematological: Negative      Psychiatric/Behavioral: Negative           Historical Information         Medical History        Past Medical History:   Diagnosis Date    Arthritis      BPH (benign prostatic hyperplasia)      Full dentures      Gross hematuria      Hearing loss      [place] (hard of hearing)       right ear    Hyperlipidemia      Hypothyroid      Lesion of bladder      Low back pain      Malignant neoplasm of overlapping sites of bladder ([address] )      Malignant neoplasm of right kidney, except renal pelvis (HCC)      Prediabetes      Renal cyst      Right renal mass      Seizures (HCC)       last seizure 14 yrs ago    Wears glasses       reading         Surgical History         Past Surgical History:   Procedure Laterality Date    COLONOSCOPY        CYSTOSCOPY   2012, 2013, 2014    CYSTOSCOPY N/A 12/22/2017     Procedure: INTRAVESICAL MITOMYCIN;  Surgeon: Ace Bailey MD;  [facility];  Service: Urology    CYSTOSCOPY W/ RETROGRADES Bilateral 2012    PARTIAL NEPHRECTOMY Right 2013    CT CYSTOURETHROSCOPY,FULGUR 2-5 CM LESN N/A 12/22/2017     Procedure: TRANSURETHRAL RESECTION OF BLADDER TUMOR (TURBT);   Surgeon: Ace Bailey MD;  [facility];  Service: Urology    RENAL CYST EXCISION         questionable malignancy    TRANSURETHRAL RESECTION OF PROSTATE   2012         Social History               History   Alcohol Use No       Comment: Former drinker            History   Drug Use No           History   Smoking Status    Former Smoker    Packs/day: 2 00    Years: 40 00    Types: Cigarettes    Quit date: 12/12/2014   Smokeless Tobacco    Never Used      Family History:         Family History   Problem Relation Age of Onset    Diabetes Family           Meds/Allergies   all medications and allergies reviewed        Allergies   Allergen Reactions    Bactrim [Sulfamethoxazole-Trimethoprim] Itching, Hives and Rash       Rash, itching         Objective   Vitals: Height 6' 3" (1 905 m), weight 120 kg (265 lb)      [unfilled]         Invasive Devices            Drain              Urethral Catheter 20 Fr  321 days                     Physical Exam   Constitutional: He is oriented to person, place, and time  He appears well-developed and well-nourished  No distress  HENT:   Head: Normocephalic and atraumatic  Eyes: Conjunctivae and EOM are normal    Neck: Neck supple  Cardiovascular: Normal rate, regular rhythm and normal heart sounds  No murmur heard  Pulmonary/Chest: Effort normal and breath sounds normal  No respiratory distress  He has no wheezes  He has no rales  He exhibits no tenderness  Abdominal: Soft  Bowel sounds are normal  He exhibits no distension and no mass  There is no tenderness  There is no rebound and no guarding  Genitourinary: Penis normal    Neurological: He is alert and oriented to person, place, and time  Psychiatric: He has a normal mood and affect  His behavior is normal  Judgment and thought content normal    Vitals reviewed         Lab Results: I have personally reviewed pertinent reports  Imaging:  Undergoing a  EKG, Pathology, and Other Studies: I have personally reviewed pertinent reports  VTE Prophylaxis: Sequential compression device Duane Lozada      Code Status: [unfilled]  Advance Directive and Living Will:      Power of : Yes  POLST:       Counseling / Coordination of Care  Total floor / unit time spent today 30 minutes  Greater than 50% of total time was spent with the patient and / or family counseling and / or coordination of care  A description of the counseling / coordination of care:         Cystoscopy  Date/Time: 11/8/2018 2:09 PM  Performed by: Sabrina Guerra  Authorized by: Sabrina Guerra     Procedure details: cystoscopy    Patient tolerance: Patient tolerated the procedure well with no immediate complications    Additional Procedure Details: The patient was placed in lithotomy position and his meatus prepped with Betadine and 2% lidocaine lubricant injected into the lumen of the urethra    This was left indwelling for 3 min the patient then underwent cystourethroscopy which revealed multiple areas of stricture in the anterior urethra both the pendulous penoscrotal and bulbar regions all of which were not obstructive to the scope  The prostatic urethra revealed bilobar enlargement of the lateral lobes of the prostate with visual occlusion to the bladder outlet  This would be amenable to TURP laser TURP or Uro left  There was no median lobe enlargement identified  The urinary bladder was free of any intrinsic lesions or extrinsic mass compression with moderate trabeculation identified  A bladder diverticulum on the left lateral wall of the bladder was easily visualized with the cystoscope was free of any intrinsic lesions  Ureteral orifices were normal position and configuration bilaterally with clear effluxBilaterally  On the posterior wall of the bladder just to the right of the midline was an irregular area of papillations identified approximately 3 cm in diameter  This area has appearance of a papillary bladder tumor that is by visualization at least not invasive  TURBT will be recommended      The patient tolerated the procedure well after removal of the cystoscope          Additional Documentation     Vitals:    Ht 6' 3" (1 905 m)    Wt 120 kg (265 lb)    BMI 33 12 kg/m²    BSA 2 47 m²    Flowsheets:    Custom Formula Data,    AUA SYMPTOM SCORE       SmartForms:    BRENDA PRE-CHARTING       Encounter Info:    Billing Info,    History,    Allergies,    Detailed Report      Zo Esquivel  (3 more)   Orders Placed         POCT urine dip auto non-scope      Cystoscopy   Medication Changes        None      Medication List   Visit Diagnoses         History of bladder cancer      History of renal cell cancer      Benign prostatic hyperplasia with lower urinary tract symptoms, symptom details unspecified      Problem List

## 2018-11-08 NOTE — LETTER
November 8, 2018     Felicia Noonan, [address]    Patient: Dalila Padilla   YOB: 1950   Date of Visit: 11/8/2018       Dear Dr Olivier Laughter:    Thank you for referring Tiff Cobian to me for evaluation  Below are my notes for this consultation  If you have questions, please do not hesitate to call me  I look forward to following your patient along with you  Sincerely,        Loly Greenberg MD        CC: No Recipients  Loly Greenberg MD  11/8/2018  2:14 PM  Sign at close encounter  H&P Exam - Urology   Dalila Padilla 76 y o  male MRN: [id number]  Unit/Bed#:  Encounter: [id number]    Assessment/Plan     Assessment:  Recurrent bladder cancer  BPH with bladder outlet obstruction  History of renal cell carcinoma  Plan:  Cystoscopy TURBT    History of Present Illness   HPI:  Dalila Padilla is a 76 y o  male who presents with low-grade low stage recurrent bladder cancer  The patient also has a history of renal cell carcinoma status post right partial nephrectomy in 2013  The patient has been receiving BCG therapy receiving a 6 week inductive course and a 3 week maintenance course and now return to this office for cystourethroscopy  A in area of irregular tissue was identified posterior to the bladder trigone to the right of the midline on the posterior wall of the bladder and the patient will now present for TURBT  The patient currently is on alpha blockers for management of his BPH however on cystourethroscopy there is visual occlusion of the bladder outlet with TURP or Uro lift to be considered in the future  There is no evidence of recurrence of his renal cell carcinoma on recent imaging  Review of Systems   Constitutional: Positive for fatigue  HENT: Negative  Respiratory: Negative  Cardiovascular: Negative  Gastrointestinal: Negative  Genitourinary: Positive for frequency and urgency          See HPI   Musculoskeletal: Positive for arthralgias and myalgias  Neurological: Negative  Hematological: Negative  Psychiatric/Behavioral: Negative  Historical Information   Past Medical History:   Diagnosis Date    Arthritis     BPH (benign prostatic hyperplasia)     Full dentures     Gross hematuria     Hearing loss     [place] (hard of hearing[place]     right ear    Hyperlipidemia     Hypothyroid     Lesion of bladder     Low back pain     Malignant neoplasm of overlapping sites of [facility] )     Malignant neoplasm of right kidney, except renal pelvis (HCC)     Prediabetes     Renal cyst     Right renal mass     Seizures ([facility] )     last seizure 14 yrs ago    Wears glasses     reading     Past Surgical History:   Procedure Laterality Date    COLONOSCOPY      CYSTOSCOPY  2012, 2013, 2014    CYSTOSCOPY N/A 12/22/2017    Procedure: INTRAVESICAL MITOMYCIN;  Surgeon: Taras Meraz MD;  [facility];  Service: Urology    CYSTOSCOPY W/ RETROGRADES Bilateral 2012    PARTIAL NEPHRECTOMY Right 2013    [place] CYSTOURETHROSCOPY,FULGUR 2-5 CM LESN N/A 12/22/2017    Procedure: TRANSURETHRAL RESECTION OF BLADDER TUMOR (TURBT); Surgeon: Taras Meraz MD;  [facility];  Service: Urology    RENAL CYST EXCISION      questionable malignancy    TRANSURETHRAL RESECTION OF PROSTATE  2012     Social History   History   Alcohol Use No     Comment: Former drinker       History   Drug Use No     History   Smoking Status    Former Smoker    Packs/day: 2 00    Years: 40 00    Types: Cigarettes    Quit date: 12/12/2014   Smokeless Tobacco    Never Used     Family History:   Family History   Problem Relation Age of Onset    Diabetes Family        Meds/Allergies   all medications and allergies reviewed  Allergies   Allergen Reactions    Bactrim [Sulfamethoxazole-Trimethoprim] Itching, Hives and Rash     Rash, itching       Objective   Vitals: Height 6' 3" (1 905 m), weight 120 kg (265 lb)  [unfilled]    Invasive Devices     Drain            Urethral Catheter 20 Fr  321 days                Physical Exam   Constitutional: He is oriented to person, place, and time  He appears well-developed and well-nourished  No distress  HENT:   Head: Normocephalic and atraumatic  Eyes: Conjunctivae and EOM are normal    Neck: Neck supple  Cardiovascular: Normal rate, regular rhythm and normal heart sounds  No murmur heard  Pulmonary/Chest: Effort normal and breath sounds normal  No respiratory distress  He has no wheezes  He has no rales  He exhibits no tenderness  Abdominal: Soft  Bowel sounds are normal  He exhibits no distension and no mass  There is no tenderness  There is no rebound and no guarding  Genitourinary: Penis normal    Neurological: He is alert and oriented to person, place, and time  Psychiatric: He has a normal mood and affect  His behavior is normal  Judgment and thought content normal    Vitals reviewed  Lab Results: I have personally reviewed pertinent reports  Imaging:  Undergoing a  EKG, Pathology, and Other Studies: I have personally reviewed pertinent reports  VTE Prophylaxis: Sequential compression device Krunal Gave)     Code Status: [unfilled]  Advance Directive and Living Will:      Power of : Yes  POLST:      Counseling / Coordination of Care  Total floor / unit time spent today 30 minutes  Greater than 50% of total time was spent with the patient and / or family counseling and / or coordination of care  A description of the counseling / coordination of care: [place] Simple Cystoscopy  Date/Time: 11/8/2018 2:09 PM  Performed by: Rosetta Garza  Authorized by: Rosetta Garza     Procedure details: cystoscopy    Patient tolerance: Patient tolerated the procedure well with no immediate complications    Additional Procedure Details:  The patient was placed in lithotomy position and his meatus prepped with Betadine and 2% lidocaine lubricant injected into the lumen of the urethra  This was left indwelling for 3 min the patient then underwent cystourethroscopy which revealed multiple areas of stricture in the anterior urethra both the pendulous penoscrotal and bulbar regions all of which were not obstructive to the scope  The prostatic urethra revealed bilobar enlargement of the lateral lobes of the prostate with visual occlusion to the bladder outlet  This would be amenable to TURP laser TURP or Uro left  There was no median lobe enlargement identified  The urinary bladder was free of any intrinsic lesions or extrinsic mass compression with moderate trabeculation identified  A bladder diverticulum on the left lateral wall of the bladder was easily visualized with the cystoscope was free of any intrinsic lesions  Ureteral orifices were normal position and configuration bilaterally with clear effluxBilaterally  On the posterior wall of the bladder just to the right of the midline was an irregular area of papillations identified approximately 3 cm in diameter  This area has appearance of a papillary bladder tumor that is by visualization at least not invasive  TURBT will be recommended  The patient tolerated the procedure well after removal of the cystoscope

## 2018-11-13 ENCOUNTER — ANESTHESIA EVENT (OUTPATIENT)
Dept: PERIOP | Facility: HOSPITAL | Age: 68
End: 2018-11-13
Payer: MEDICARE

## 2018-11-13 RX ORDER — SODIUM CHLORIDE 9 MG/ML
125 INJECTION, SOLUTION INTRAVENOUS CONTINUOUS
Status: CANCELLED | OUTPATIENT
Start: 2018-11-30

## 2018-11-14 ENCOUNTER — APPOINTMENT (OUTPATIENT)
Dept: PREADMISSION TESTING | Facility: HOSPITAL | Age: 68
End: 2018-11-14
Payer: MEDICARE

## 2018-11-14 ENCOUNTER — APPOINTMENT (OUTPATIENT)
Dept: LAB | Facility: HOSPITAL | Age: 68
End: 2018-11-14
Attending: UROLOGY
Payer: MEDICARE

## 2018-11-14 ENCOUNTER — HOSPITAL ENCOUNTER (OUTPATIENT)
Dept: NON INVASIVE DIAGNOSTICS | Facility: HOSPITAL | Age: 68
Discharge: HOME | End: 2018-11-14
Attending: UROLOGY
Payer: MEDICARE

## 2018-11-14 DIAGNOSIS — C67.9 MALIGNANT NEOPLASM OF URINARY BLADDER, UNSPECIFIED SITE (HCC): ICD-10-CM

## 2018-11-14 LAB
ALBUMIN SERPL BCP-MCNC: 4 G/DL (ref 3.5–5)
ALP SERPL-CCNC: 132 U/L (ref 46–116)
ALT SERPL W P-5'-P-CCNC: 31 U/L (ref 12–78)
ANION GAP SERPL CALCULATED.3IONS-SCNC: 9 MMOL/L (ref 4–13)
APTT PPP: 31 SECONDS (ref 26–38)
AST SERPL W P-5'-P-CCNC: 16 U/L (ref 5–45)
ATRIAL RATE: 83 BPM
BASOPHILS # BLD AUTO: 0.07 THOUSANDS/ΜL (ref 0–0.1)
BASOPHILS NFR BLD AUTO: 1 % (ref 0–1)
BILIRUB SERPL-MCNC: 0.27 MG/DL (ref 0.2–1)
BUN SERPL-MCNC: 14 MG/DL (ref 5–25)
CALCIUM SERPL-MCNC: 8.9 MG/DL (ref 8.3–10.1)
CHLORIDE SERPL-SCNC: 101 MMOL/L (ref 100–108)
CO2 SERPL-SCNC: 28 MMOL/L (ref 21–32)
CREAT SERPL-MCNC: 1.13 MG/DL (ref 0.6–1.3)
EOSINOPHIL # BLD AUTO: 0.19 THOUSAND/ΜL (ref 0–0.61)
EOSINOPHIL NFR BLD AUTO: 2 % (ref 0–6)
ERYTHROCYTE [DISTWIDTH] IN BLOOD BY AUTOMATED COUNT: 12.4 % (ref 11.6–15.1)
GFR SERPL CREATININE-BSD FRML MDRD: 66 ML/MIN/1.73SQ M
GLUCOSE SERPL-MCNC: 148 MG/DL (ref 65–140)
HCT VFR BLD AUTO: 45 % (ref 36.5–49.3)
HGB BLD-MCNC: 15.1 G/DL (ref 12–17)
IMM GRANULOCYTES # BLD AUTO: 0.06 THOUSAND/UL (ref 0–0.2)
IMM GRANULOCYTES NFR BLD AUTO: 1 % (ref 0–2)
INR PPP: 1 (ref 0.86–1.17)
LYMPHOCYTES # BLD AUTO: 1.82 THOUSANDS/ΜL (ref 0.6–4.47)
LYMPHOCYTES NFR BLD AUTO: 20 % (ref 14–44)
MCH RBC QN AUTO: 31.8 PG (ref 26.8–34.3)
MCHC RBC AUTO-ENTMCNC: 33.6 G/DL (ref 31.4–37.4)
MCV RBC AUTO: 95 FL (ref 82–98)
MONOCYTES # BLD AUTO: 0.61 THOUSAND/ΜL (ref 0.17–1.22)
MONOCYTES NFR BLD AUTO: 7 % (ref 4–12)
NEUTROPHILS # BLD AUTO: 6.39 THOUSANDS/ΜL (ref 1.85–7.62)
NEUTS SEG NFR BLD AUTO: 69 % (ref 43–75)
NRBC BLD AUTO-RTO: 0 /100 WBCS
P AXIS: 56 DEGREES
PLATELET # BLD AUTO: 314 THOUSANDS/UL (ref 149–390)
PMV BLD AUTO: 9.3 FL (ref 8.9–12.7)
POTASSIUM SERPL-SCNC: 4.2 MMOL/L (ref 3.5–5.3)
PR INTERVAL: 192 MS
PROT SERPL-MCNC: 7.8 G/DL (ref 6.4–8.2)
PROTHROMBIN TIME: 13.3 SECONDS (ref 11.8–14.2)
QRS AXIS: 48 DEGREES
QRSD INTERVAL: 98 MS
QT INTERVAL: 354 MS
QTC INTERVAL: 415 MS
RBC # BLD AUTO: 4.75 MILLION/UL (ref 3.88–5.62)
SODIUM SERPL-SCNC: 138 MMOL/L (ref 136–145)
T WAVE AXIS: 64 DEGREES
VENTRICULAR RATE: 83 BPM
WBC # BLD AUTO: 9.14 THOUSAND/UL (ref 4.31–10.16)

## 2018-11-14 PROCEDURE — 85025 COMPLETE CBC W/AUTO DIFF WBC: CPT

## 2018-11-14 PROCEDURE — 36415 COLL VENOUS BLD VENIPUNCTURE: CPT

## 2018-11-14 PROCEDURE — 93010 ELECTROCARDIOGRAM REPORT: CPT | Performed by: INTERNAL MEDICINE

## 2018-11-14 PROCEDURE — 93005 ELECTROCARDIOGRAM TRACING: CPT

## 2018-11-14 PROCEDURE — 87086 URINE CULTURE/COLONY COUNT: CPT

## 2018-11-14 PROCEDURE — 85730 THROMBOPLASTIN TIME PARTIAL: CPT

## 2018-11-14 PROCEDURE — 85610 PROTHROMBIN TIME: CPT

## 2018-11-14 PROCEDURE — 80053 COMPREHEN METABOLIC PANEL: CPT

## 2018-11-14 NOTE — ANESTHESIA PREPROCEDURE EVALUATION
Review of Systems/Medical History  Patient summary reviewed  Chart reviewed      Cardiovascular  Hyperlipidemia,    Pulmonary  Smoker ex-smoker  ,        GI/Hepatic  Negative GI/hepatic ROS          Genitourinary malignancy Bladder cancer and renal cancer,        Endo/Other  History of thyroid disease , hypothyroidism,   Obesity    GYN       Hematology  Negative hematology ROS      Musculoskeletal    Arthritis     Neurology  Seizures well controlled,     Psychology           Physical Exam    Airway    Mallampati score: III  TM Distance: >3 FB  Neck ROM: full     Dental   No notable dental hx     Cardiovascular  Rhythm: regular, Rate: normal, Cardiovascular exam normal    Pulmonary  Pulmonary exam normal Breath sounds clear to auscultation,     Other Findings        Anesthesia Plan  ASA Score- 3     Anesthesia Type- general with ASA Monitors  Additional Monitors:   Airway Plan:         Plan Factors-Patient not instructed to abstain from smoking on day of procedure  Patient did not smoke on day of surgery  Induction- intravenous  Postoperative Plan-     Informed Consent- Anesthetic plan and risks discussed with patient

## 2018-11-14 NOTE — PRE-PROCEDURE INSTRUCTIONS
Pre-Surgery Instructions:   Medication Instructions    alfuzosin (UROXATRAL) 10 mg 24 hr tablet Patient was instructed by Physician and understands   aspirin (ECOTRIN LOW STRENGTH) 81 mg EC tablet Patient was instructed by Physician and understands   carBAMazepine (TEGretol XR) 400 mg 12 hr tablet Patient was instructed by Physician and understands   Folate-B12-Intrinsic Factor (INTRINSI E77-UEWGFC) [id number] MCG-MCG-MG TABS Patient was instructed by Physician and understands   levothyroxine 75 mcg tablet Patient was instructed by Physician and understands   Naproxen Sodium (ALEVE PO) Patient was instructed by Physician and understands   rosuvastatin (CRESTOR) 20 MG tablet Patient was instructed by Physician and understands  Patient seen by Dr Vee Centeno  Instructed to take Levothyroxine and Tegretol the morning of surgery with sip of water  Stop aspirin, NSAIDs, vitamins, and supplements 1 week before surgery    Instructed re chlorhexidine showers per hospital policy

## 2018-11-15 LAB — BACTERIA UR CULT: ABNORMAL

## 2018-11-30 ENCOUNTER — HOSPITAL ENCOUNTER (OUTPATIENT)
Facility: HOSPITAL | Age: 68
Setting detail: OUTPATIENT SURGERY
Discharge: HOME | End: 2018-11-30
Attending: UROLOGY | Admitting: UROLOGY
Payer: MEDICARE

## 2018-11-30 ENCOUNTER — HOSPITAL ENCOUNTER (OUTPATIENT)
Dept: RADIOLOGY | Facility: HOSPITAL | Age: 68
Setting detail: OUTPATIENT SURGERY
Discharge: HOME | End: 2018-11-30
Payer: MEDICARE

## 2018-11-30 ENCOUNTER — ANESTHESIA (OUTPATIENT)
Dept: PERIOP | Facility: HOSPITAL | Age: 68
End: 2018-11-30
Payer: MEDICARE

## 2018-11-30 VITALS
SYSTOLIC BLOOD PRESSURE: 141 MMHG | BODY MASS INDEX: 35.06 KG/M2 | TEMPERATURE: 98.4 F | WEIGHT: 282 LBS | HEART RATE: 86 BPM | DIASTOLIC BLOOD PRESSURE: 74 MMHG | HEIGHT: 75 IN | RESPIRATION RATE: 20 BRPM | OXYGEN SATURATION: 94 %

## 2018-11-30 DIAGNOSIS — Z85.51 HISTORY OF BLADDER CANCER: ICD-10-CM

## 2018-11-30 PROCEDURE — 88307 TISSUE EXAM BY PATHOLOGIST: CPT | Performed by: PATHOLOGY

## 2018-11-30 PROCEDURE — 76000 FLUOROSCOPY <1 HR PHYS/QHP: CPT

## 2018-11-30 PROCEDURE — 52235 CYSTOSCOPY AND TREATMENT: CPT | Performed by: UROLOGY

## 2018-11-30 RX ORDER — ONDANSETRON 2 MG/ML
4 INJECTION INTRAMUSCULAR; INTRAVENOUS ONCE AS NEEDED
Status: DISCONTINUED | OUTPATIENT
Start: 2018-11-30 | End: 2018-11-30 | Stop reason: HOSPADM

## 2018-11-30 RX ORDER — DEXAMETHASONE SODIUM PHOSPHATE 4 MG/ML
INJECTION, SOLUTION INTRA-ARTICULAR; INTRALESIONAL; INTRAMUSCULAR; INTRAVENOUS; SOFT TISSUE AS NEEDED
Status: DISCONTINUED | OUTPATIENT
Start: 2018-11-30 | End: 2018-11-30 | Stop reason: SURG

## 2018-11-30 RX ORDER — FENTANYL CITRATE 50 UG/ML
INJECTION, SOLUTION INTRAMUSCULAR; INTRAVENOUS AS NEEDED
Status: DISCONTINUED | OUTPATIENT
Start: 2018-11-30 | End: 2018-11-30 | Stop reason: SURG

## 2018-11-30 RX ORDER — FENTANYL CITRATE/PF 50 MCG/ML
25 SYRINGE (ML) INJECTION
Status: DISCONTINUED | OUTPATIENT
Start: 2018-11-30 | End: 2018-11-30 | Stop reason: HOSPADM

## 2018-11-30 RX ORDER — SODIUM CHLORIDE 9 MG/ML
125 INJECTION, SOLUTION INTRAVENOUS CONTINUOUS
Status: DISCONTINUED | OUTPATIENT
Start: 2018-11-30 | End: 2018-11-30 | Stop reason: HOSPADM

## 2018-11-30 RX ORDER — OXYCODONE HYDROCHLORIDE AND ACETAMINOPHEN 5; 325 MG/1; MG/1
1 TABLET ORAL EVERY 6 HOURS PRN
Qty: 10 TABLET | Refills: 0 | Status: SHIPPED | OUTPATIENT
Start: 2018-11-30 | End: 2019-12-26

## 2018-11-30 RX ORDER — MAGNESIUM HYDROXIDE 1200 MG/15ML
LIQUID ORAL AS NEEDED
Status: DISCONTINUED | OUTPATIENT
Start: 2018-11-30 | End: 2018-11-30 | Stop reason: HOSPADM

## 2018-11-30 RX ORDER — CIPROFLOXACIN 500 MG/1
500 TABLET, FILM COATED ORAL EVERY 24 HOURS
Qty: 10 TABLET | Refills: 0 | Status: SHIPPED | OUTPATIENT
Start: 2018-11-30 | End: 2018-12-10

## 2018-11-30 RX ORDER — SORBITOL 30 G/1000ML
IRRIGANT IRRIGATION AS NEEDED
Status: DISCONTINUED | OUTPATIENT
Start: 2018-11-30 | End: 2018-11-30 | Stop reason: HOSPADM

## 2018-11-30 RX ORDER — OXYBUTYNIN CHLORIDE 10 MG/1
10 TABLET, EXTENDED RELEASE ORAL
Qty: 10 TABLET | Refills: 0 | Status: SHIPPED | OUTPATIENT
Start: 2018-11-30 | End: 2019-12-26

## 2018-11-30 RX ORDER — CIPROFLOXACIN 500 MG/1
500 TABLET, FILM COATED ORAL DAILY
Qty: 10 TABLET | Refills: 0 | Status: SHIPPED | OUTPATIENT
Start: 2018-11-30 | End: 2018-12-07 | Stop reason: SDUPTHER

## 2018-11-30 RX ORDER — PROPOFOL 10 MG/ML
INJECTION, EMULSION INTRAVENOUS AS NEEDED
Status: DISCONTINUED | OUTPATIENT
Start: 2018-11-30 | End: 2018-11-30 | Stop reason: SURG

## 2018-11-30 RX ORDER — MIDAZOLAM HYDROCHLORIDE 1 MG/ML
INJECTION INTRAMUSCULAR; INTRAVENOUS AS NEEDED
Status: DISCONTINUED | OUTPATIENT
Start: 2018-11-30 | End: 2018-11-30 | Stop reason: SURG

## 2018-11-30 RX ORDER — ONDANSETRON 2 MG/ML
INJECTION INTRAMUSCULAR; INTRAVENOUS AS NEEDED
Status: DISCONTINUED | OUTPATIENT
Start: 2018-11-30 | End: 2018-11-30 | Stop reason: SURG

## 2018-11-30 RX ADMIN — Medication 3000 MG: at 13:03

## 2018-11-30 RX ADMIN — ONDANSETRON HYDROCHLORIDE 4 MG: 2 INJECTION, SOLUTION INTRAVENOUS at 13:37

## 2018-11-30 RX ADMIN — FENTANYL CITRATE 25 MCG: 50 INJECTION, SOLUTION INTRAMUSCULAR; INTRAVENOUS at 14:35

## 2018-11-30 RX ADMIN — SODIUM CHLORIDE: 0.9 INJECTION, SOLUTION INTRAVENOUS at 13:33

## 2018-11-30 RX ADMIN — SODIUM CHLORIDE 125 ML/HR: 0.9 INJECTION, SOLUTION INTRAVENOUS at 11:45

## 2018-11-30 RX ADMIN — MIDAZOLAM 2 MG: 1 INJECTION INTRAMUSCULAR; INTRAVENOUS at 12:51

## 2018-11-30 RX ADMIN — PROPOFOL 200 MG: 10 INJECTION, EMULSION INTRAVENOUS at 13:00

## 2018-11-30 RX ADMIN — FENTANYL CITRATE 50 MCG: 50 INJECTION, SOLUTION INTRAMUSCULAR; INTRAVENOUS at 13:13

## 2018-11-30 RX ADMIN — FENTANYL CITRATE 50 MCG: 50 INJECTION, SOLUTION INTRAMUSCULAR; INTRAVENOUS at 13:37

## 2018-11-30 RX ADMIN — LIDOCAINE HYDROCHLORIDE 50 MG: 20 INJECTION, SOLUTION INTRAVENOUS at 13:00

## 2018-11-30 RX ADMIN — DEXAMETHASONE SODIUM PHOSPHATE 4 MG: 4 INJECTION, SOLUTION INTRAMUSCULAR; INTRAVENOUS at 13:07

## 2018-11-30 NOTE — DISCHARGE INSTRUCTIONS
Bladder Cancer   AMBULATORY CARE:   Bladder cancer  starts in the cells that line your bladder  Common symptoms include the following:   · Blood in your urine or urine that is pink or orange    · A sudden need to urinate, or urinating more often than usual    · Trouble starting the stream of urine or urinating very little    · Pain or burning when you urinate    · Pain in your abdomen or pelvis     · Unexplained weight loss    · Feeling tired or weak  Call 911 for any of the following:   · You suddenly feel lightheaded and short of breath  · You cough up blood  Seek care immediately if:   · Your arm or leg feels warm, tender, and painful  It may look swollen and red  · You are unable to urinate  Contact your healthcare provider or oncologist if:   · You have a fever  · You vomit and cannot keep any liquids or food down  · You have new or worsening pain  · Your pain gets worse or does not go away after you take pain medicine  · You have questions or concerns about your condition or care  Treatment for bladder cancer  may include any of the following:  · Transurethral resection of bladder tumor (TURBT)  is a procedure used to remove the tumor  Bladder muscle near the tumor may also be removed  This procedure is done by inserting tools through your urethra and into your bladder  · Immunotherapy  is medicine given to help your immune system kill cancer cells  It is injected into your vein or directly into your bladder  · Chemotherapy  is medicine given to kill cancer cells  It may be given to you as a pill or an injection into your vein or muscle  It may also be injected directly into your bladder  · Radiation therapy  uses high-energy x-ray beams to kill cancer cells  · Surgery  may be needed to remove your bladder  Surrounding organs and lymph nodes may also be removed  Self-care:   · Do not smoke    Nicotine can damage blood vessels and make it hard to manage your bladder cancer  Smoking also increases your risk for new or returning cancer  Ask your healthcare provider for information if you currently smoke and need help to quit  E-cigarettes or smokeless tobacco still contain nicotine  Talk to your healthcare provider before you use these products  · Limit or do not drink alcohol  Alcohol may cause you to become dehydrated  Ask your oncologist if it is safe for you to drink alcohol, and how much is safe to drink  · Eat healthy foods  Healthy foods include fruit, vegetables, whole-grain breads, low-fat dairy products, beans, lean meats, and fish  Your healthcare provider may recommend that you eat less red meat  You need to eat enough calories to help prevent weight loss and increase your energy level  You also need protein to give you strength  If you do not feel hungry, eat small amounts often instead of large meals  · Drink liquids as directed  You may need to drink more liquids than usual to prevent dehydration  Ask how much liquid to drink each day and which liquids are best for you  · Exercise as directed  Exercise may help increase your energy level and appetite  Ask your healthcare provider how much exercise you need and which exercises are best for you  For more information and support: It may be difficult for you and your family to go through cancer and cancer treatments  Join a support group or talk with others who have gone through treatment  · [address] [place] [address]  Phone: [phone]  Web Address: http://[employer]/  [employer]  · [address] [place] [address]  Phone: 3- 027 - [phone]  Web Address: http://[employer]/  gov  Follow up with your healthcare provider or oncologist as directed: You will need to see your oncologist for ongoing treatment  Write down your questions so you remember to ask them during your visits    © 2017 [facility] is for End User's use only and may not be sold, redistributed or otherwise used for commercial purposes  All illustrations and images included in CareNotes® are the copyrighted property of A D A M , Inc  or Gaet[employer] Billy  The above information is an  only  It is not intended as medical advice for individual conditions or treatments  Talk to your doctor, nurse or pharmacist before following any medical regimen to see if it is safe and effective for you  Miguel Catheter Placement and Care   WHAT YOU NEED TO KNOW:   A Miguel catheter is a sterile tube that is inserted into your bladder to drain urine  It is also called an indwelling urinary catheter  The tip of the catheter has a small balloon filled with solution that holds the catheter inside your bladder  DISCHARGE INSTRUCTIONS:   Return to the emergency department if:   · Your catheter comes out  · You suddenly have material that looks like sand in the tubing or drainage bag  · No urine is draining into the bag and you have checked the system  · You have pain in your hip, back, pelvis, or lower abdomen  · You are confused or cannot think clearly  Contact your healthcare provider if:   · You have a fever  · You have bladder spasms for more than 1 day after the catheter is placed  · You see blood in the tubing or drainage bag  · You have a rash or itching where the catheter tube is secured to your skin  · Urine leaks from or around the catheter, tubing, or drainage bag  · The closed drainage system has accidently come open or apart  · You see a layer of crystals inside the tubing  · You have questions or concerns about your condition or care  Care for your Miguel catheter:   · Clean your genital area 2 times every day  Clean your catheter and the area around where it was inserted  Use soap and water   Clean your anal opening and catheter area after every bowel movement  · Secure the catheter tube  so you do not pull or move the catheter  This helps prevent pain and bladder spasms  Healthcare providers will show you how to use medical tape or a strap to secure the catheter tube to your body  · Keep a closed drainage system  Your Miguel catheter should always be attached to the drainage bag to form a closed system  Do not disconnect any part of the closed system unless you need to change the bag  Care for your drainage bag:   · Ask if a leg bag is right for you  A leg bag can be worn under your clothes  Ask your healthcare provider for more information about a leg bag  · Keep the drainage bag below the level of your waist   This helps stop urine from moving back up the tubing and into your bladder  Do not loop or kink the tubing  This can cause urine to back up and collect in your bladder  Do not let the drainage bag touch or lie on the floor  · Empty the drainage bag when needed  The weight of a full drainage bag can be painful  Empty the drainage bag every 3 to 6 hours or when it is ? full  · Clean and change the drainage bag as directed  Ask your healthcare provider how often you should change the drainage bag and what cleaning solution to use  Wear disposable gloves when you change the bag  Do not allow the end of the catheter or tubing to touch anything  Clean the ends with an alcohol pad before you reconnect them  What to do if problems develop:   · No urine is draining into the bag:      ¨ Check for kinks in the tubing and straighten them out  ¨ Check the tape or strap used to secure the catheter tube to your skin  Make sure it is not blocking the tube  ¨ Make sure you are not sitting or lying on the tubing  ¨ Make sure the urine bag is hanging below the level of your waist     · Urine leaks from or around the catheter, tubing, or drainage bag:  Check if the closed drainage system has accidently come open or apart   Clean the catheter and tubing ends with a new alcohol pad and reconnect them  Prevent an infection:   · Wash your hands often  Wash before and after you touch your catheter, tubing, or drainage bag  Use soap and water  Wear clean disposable gloves when you care for your catheter or disconnect the drainage bag  Wash your hands before you prepare or eat food  · Drink liquids as directed  Ask your healthcare provider how much liquid to drink each day and which liquids are best for you  Liquids will help flush your kidneys and bladder to help prevent infection  Follow up with your healthcare provider as directed:  Write down your questions so you remember to ask them during your visits  © 2017 [facility] Information is for End User's use only and may not be sold, redistributed or otherwise used for commercial purposes  All illustrations and images included in CareNotes® are the copyrighted property of A D A M , Inc  or Gaet[employer] Billy  The above information is an  only  It is not intended as medical advice for individual conditions or treatments  Talk to your doctor, nurse or pharmacist before following any medical regimen to see if it is safe and effective for you

## 2018-11-30 NOTE — DISCHARGE SUMMARY
Discharge Summary - Nancy Barton 76 y o  male MRN: [id number]    Unit/Bed#: OR POOL Encounter: [id number]    Admission Date: 11/30/2018     Discharge Date: 11/30/18  Admitting Diagnosis: History of bladder cancer [Z85 51]   History of renal cancer  BPH with bladder outlet obstructive symptoms    Admitting Provider: Elena Mcintyre MD    Discharging Provider: Elena Mcintyre MD    Primary Care Physician at Discharge: Ada Reyna [phone]     HPI:This is a 76 y o  old male presented with History of bladder cancer [Z85 51],   The patient has a known history of bladder cancer and is currently receiving intravesical BCG therapy  Surveillance cystoscopy revealed a lesion on the posterior wall of the bladder to the right of the midline with patient also being evaluated for BPH obstructive voiding symptoms  The patient presented for TURBT bilateral retrograde pyelography to look at the upper urinary tracts in view of his past history of urothelial malignancy  The patient presented and was taken to the operating room at which time cystoscopy and bilateral retrograde pyelography was performed  Upper urinary tracts on retrograde pyelography appeared normal without filling defect dilation or extrinsic displacement  The lower urinary tract revealed a strictured urethra in the pendulous and bulbar portions without obstructive strictures however the overall urethra was tight for a 24 [place] resectoscope  The prostatic urethra revealed bilobar enlargement of the lateral lobes of the prostate with visual occlusion of the bladder outlet  The urinary bladder for the most part looked good without any evidence papillary or malignant-appearing lesions  There was a large wide-mouth left bladder diverticulum free of any lesions  There was however a somewhat friable papillary lesion located to the right of the midline the posterior wall of the bladder almost at the dome    This is an area where previous bladder cancer was identified  This area was resected in its entirety visually down to muscle and some fat tissue  Pathology was collected is currently pending  Postoperatively the patient was left with a 25 [place] Miguel catheter to straight drainage with light pink drainage  His abdomen was benign at the time of discharge and was discharged with the Miguel catheter draining to straight drainage with instructions to return in approximately 1 week for voiding trial in follow-up  Allergies   Allergen Reactions    Bactrim [Sulfamethoxazole-Trimethoprim] Itching, Hives and Rash     Rash, itching       Consults   None       Order Current Status    Tissue Exam Collected (11/30/18 1343)          Procedures Performed: No orders of the defined types were placed in this encounter  Hospital Course: Tolerated hospitalization well without complication    Significant Findings, Care, Treatment and Services Provided:  Cystoscopy bilateral retrograde pyelography TURBT-lesion less than 2 5 cm    Complications:  None    Discharge Diagnosis:  Bladder lesion-posterior wall of bladder with history of bladder cancer  BPH with obstructive symptoms    Condition at Discharge: good     Discharge instructions/Information to patient and family:   See after visit summary for information provided to patient and family  Provisions for Follow-Up Care:  See after visit summary for information related to follow-up care and any pertinent home health orders  Disposition: Home    Planned Readmission: No    Discharge Statement   I spent 40 minutes discharging the patient  This time was spent on the day of discharge  I had direct contact with the patient on the day of discharge  Additional documentation is required if more than 30 minutes were spent on discharge  Discharge Medications:  See after visit summary for reconciled discharge medications provided to patient and family          ?  ?

## 2018-11-30 NOTE — INTERIM OP NOTE
TURBT, CYSTO, RETROGRADE PYELOGRAM  Postoperative Note  PATIENT NAME: Mary Kay Angelo  : 1950  MRN: [id number]  [facility] OR ROOM 08    Surgery Date: 2018    Preop Diagnosis:  History of bladder cancer [Z85 51]    Post-Op Diagnosis Codes:     * History of bladder cancer [Z85 51]    Procedure(s) (LRB):  TURBT (N/A)  CYSTO, RETROGRADE PYELOGRAM (Bilateral)    Surgeon(s) and Role:     * Bertha Landon MD - Primary    Specimens:  ID Type Source Tests Collected by Time Destination   1 : BLADDER TUMOR Tissue Soft Tissue, Other TISSUE EXAM Bertha Landon MD 2018 1343        Estimated Blood Loss:   Minimal    Anesthesia Type:   General     Findings:    Normal bilateral retrograde pyelography and papillary lesion posterior wall of bladder to the right of the midline resected into muscle without bleeding    BPH with bladder outlet obstruction  Complications:   None    SIGNATURE: Bertha Landon MD   DATE: 2018   TIME: 1:50 PM

## 2018-11-30 NOTE — H&P (VIEW-ONLY)
H&P Exam - Urology   Jaycee Marie 76 y o  male MRN: [id number]  Unit/Bed#:  Encounter: [id number]    Assessment/Plan     Assessment:  Recurrent bladder cancer  BPH with bladder outlet obstruction  History of renal cell carcinoma  Plan:  Cystoscopy TURBT    History of Present Illness   HPI:  Jaycee Marie is a 76 y o  male who presents with low-grade low stage recurrent bladder cancer  The patient also has a history of renal cell carcinoma status post right partial nephrectomy in 2013  The patient has been receiving BCG therapy receiving a 6 week inductive course and a 3 week maintenance course and now return to this office for cystourethroscopy  A in area of irregular tissue was identified posterior to the bladder trigone to the right of the midline on the posterior wall of the bladder and the patient will now present for TURBT  The patient currently is on alpha blockers for management of his BPH however on cystourethroscopy there is visual occlusion of the bladder outlet with TURP or Uro lift to be considered in the future  There is no evidence of recurrence of his renal cell carcinoma on recent imaging  Review of Systems   Constitutional: Positive for fatigue  HENT: Negative  Respiratory: Negative  Cardiovascular: Negative  Gastrointestinal: Negative  Genitourinary: Positive for frequency and urgency  See HPI   Musculoskeletal: Positive for arthralgias and myalgias  Neurological: Negative  Hematological: Negative  Psychiatric/Behavioral: Negative          Historical Information   Past Medical History:   Diagnosis Date    Arthritis     BPH (benign prostatic hyperplasia)     Full dentures     Gross hematuria     Hearing loss     [place] (hard of hearing)     right ear    Hyperlipidemia     Hypothyroid     Lesion of bladder     Low back pain     Malignant neoplasm of overlapping sites of bladder ([address] )     Malignant neoplasm of right kidney, except renal pelvis ([address] )  Prediabetes     Renal cyst     Right renal mass     Seizures ([address] )     last seizure 14 yrs ago    Wears glasses     reading     Past Surgical History:   Procedure Laterality Date    COLONOSCOPY      CYSTOSCOPY  2012, 2013, 2014    CYSTOSCOPY N/A 12/22/2017    Procedure: INTRAVESICAL MITOMYCIN;  Surgeon: Terri oLuis MD;  [facility];  Service: Urology    CYSTOSCOPY W/ RETROGRADES Bilateral 2012    PARTIAL NEPHRECTOMY Right 2013    VA CYSTOURETHROSCOPY,FULGUR 2-5 CM LESN N/A 12/22/2017    Procedure: TRANSURETHRAL RESECTION OF BLADDER TUMOR (TURBT); Surgeon: Terri Louis MD;  [facility];  Service: Urology    RENAL CYST EXCISION      questionable malignancy    TRANSURETHRAL RESECTION OF PROSTATE  2012     Social History   History   Alcohol Use No     Comment: Former drinker  History   Drug Use No     History   Smoking Status    Former Smoker    Packs/day: 2 00    Years: 40 00    Types: Cigarettes    Quit date: 12/12/2014   Smokeless Tobacco    Never Used     Family History:   Family History   Problem Relation Age of Onset    Diabetes Family        Meds/Allergies   all medications and allergies reviewed  Allergies   Allergen Reactions    Bactrim [Sulfamethoxazole-Trimethoprim] Itching, Hives and Rash     Rash, itching       Objective   Vitals: Height 6' 3" (1 905 m), weight 120 kg (265 lb)  [unfilled]    Invasive Devices     Drain            Urethral Catheter 20 Fr  321 days                Physical Exam   Constitutional: He is oriented to person, place, and time  He appears well-developed and well-nourished  No distress  HENT:   Head: Normocephalic and atraumatic  Eyes: Conjunctivae and EOM are normal    Neck: Neck supple  Cardiovascular: Normal rate, regular rhythm and normal heart sounds  No murmur heard  Pulmonary/Chest: Effort normal and breath sounds normal  No respiratory distress  He has no wheezes  He has no rales   He exhibits no tenderness  Abdominal: Soft  Bowel sounds are normal  He exhibits no distension and no mass  There is no tenderness  There is no rebound and no guarding  Genitourinary: Penis normal    Neurological: He is alert and oriented to person, place, and time  Psychiatric: He has a normal mood and affect  His behavior is normal  Judgment and thought content normal    Vitals reviewed  Lab Results: I have personally reviewed pertinent reports  Imaging:  Undergoing a  EKG, Pathology, and Other Studies: I have personally reviewed pertinent reports  VTE Prophylaxis: Sequential compression device Duane Lozada     Code Status: [unfilled]  Advance Directive and Living Will:      Power of : Yes  POLST:      Counseling / Coordination of Care  Total floor / unit time spent today 30 minutes  Greater than 50% of total time was spent with the patient and / or family counseling and / or coordination of care  A description of the counseling / coordination of care: Toño Hernandez Cystoscopy  Date/Time: 11/8/2018 2:09 PM  Performed by: Sabrina Guerra  Authorized by: Sabrina Guerra     Procedure details: cystoscopy    Patient tolerance: Patient tolerated the procedure well with no immediate complications    Additional Procedure Details: The patient was placed in lithotomy position and his meatus prepped with Betadine and 2% lidocaine lubricant injected into the lumen of the urethra  This was left indwelling for 3 min the patient then underwent cystourethroscopy which revealed multiple areas of stricture in the anterior urethra both the pendulous penoscrotal and bulbar regions all of which were not obstructive to the scope  The prostatic urethra revealed bilobar enlargement of the lateral lobes of the prostate with visual occlusion to the bladder outlet  This would be amenable to TURP laser TURP or Uro left  There was no median lobe enlargement identified    The urinary bladder was free of any intrinsic lesions or extrinsic mass compression with moderate trabeculation identified  A bladder diverticulum on the left lateral wall of the bladder was easily visualized with the cystoscope was free of any intrinsic lesions  Ureteral orifices were normal position and configuration bilaterally with clear effluxBilaterally  On the posterior wall of the bladder just to the right of the midline was an irregular area of papillations identified approximately 3 cm in diameter  This area has appearance of a papillary bladder tumor that is by visualization at least not invasive  TURBT will be recommended  The patient tolerated the procedure well after removal of the cystoscope

## 2018-11-30 NOTE — OP NOTE
OPERATIVE REPORT  PATIENT NAME: Eugenio Sheffield    :  1950  MRN: [id number]  Pt Location: [facility] OR ROOM 08    SURGERY DATE: 2018    Surgeon(s) and Role:     * Caity Alfredo MD - Primary    Preop Diagnosis:  History of bladder cancer [Z85 51]    Post-Op Diagnosis Codes:     * History of bladder cancer [Z85 51]    Procedure(s) (LRB):  TURBT (N/A)  CYSTO, RETROGRADE PYELOGRAM (Bilateral)    Specimen(s):  ID Type Source Tests Collected by Time Destination   1 : BLADDER TUMOR Tissue Soft Tissue, Other TISSUE EXAM Caity Alfredo MD 2018 1343        Estimated Blood Loss:   Minimal    Drains:  Urethral Catheter 20 Fr  (Active)   Number of days: 343       Urethral Catheter Latex; Double-lumen 22 Fr  (Active)   Number of days: 0       Anesthesia Type:   General    Operative Indications:  History of bladder cancer [Z85 51]  Bladder lesion-posterior wall of bladder  BPH obstructive symptoms      Operative Findings:  See operative note--bladder lesion noted to be 2 cm in size    Complications:   None    Procedure and Technique:  Patient was seen in the holding area and I again reviewed findings on cystoscopy in the office and the reason for performing TURBT and retrograde pyelography  Patient agreed understanding risks of perforation bleeding stricture infection need for additional procedures the possibility of bladder cancer recurrence or even spread  Patient agreed to the procedure i e  Cystoscopy trans urethral resection of a bladder tumor and bilateral retrograde pyelography  The patient was taken to the operating room identified by Pawel Felix MD placed in the dorsal lithotomy position prepped draped usual sterile fashion after anesthetic induction  After appropriate time-out a 25 Kazakh cystoscope was placed retrograde up the urethra into the urinary bladder under direct vision using a 30 degree telescope    The urethra was free of any obstructive strictures however there were pendulous and bulbar urethral strictures that were nonobstructing and allowed the passage of the scope  The prostatic urethra revealed bilobar enlargement of the lateral lobes of the prostate with visual occlusion to the bladder outlet  The urinary bladder was examined using a 70 degree telescope was mildly trabeculated was free of any intrinsic lesions except for a posterior wall raised lesion consistent with a papillary bladder tumor by visualization  This was on the posterior wall of the bladder just to the right of the midline  Bilateral ureteral orifices were golf hole in shape and patulous  Retrograde pyelography was performed under fluoroscopic control by injecting half-strength contrast up each ureteral orifice using an 8 [place]ican occluding tip ureteral catheter  There is no evidence of hydronephrosis filling defects or extrinsic displacement of the upper urinary tracts bilaterally with both retrograde pyelography studies being normal   There was good upper tract drainage noted fluoroscopically  At this point all equipment removed the patient's lower urinary tract and a 24 [place] resectoscopic obturator and sheath was placed per urethra into the urinary bladder  The 24 scope was very tight and ability to manipulate scope somewhat limited  Due to the long urethra the tight urethra the big prostate and the fact that the lesion was on the posterior wall of the bladder stat the dome made resection very difficult  Using a type 1 cutting current with a 24 [place] loop through the resectoscope the cutting current was used to resect the above-noted lesion on the posterior wall the bladder  There was no bleeding  The coagulation current was used to fulgurate the periphery and the base of the resection  The base of the resection was somewhat thin and there was some fat seen but there was no evidence of any free perforation    Because of the thinness of the wall of the bladder after resection it was felt that a Miguel catheter should be left in place draining the bladder for a week  A 22 Bulgarian Miguel catheter was left per urethra draining the urinary bladder to straight drainage with the efflux being light pink to clear  At this point the patients seizure was reversed and he was transferred to the recovery room having tolerated the procedure well and in good condition      I was present for the entire procedure and A qualified resident physician was not available    Patient Disposition:  PACU     SIGNATURE: David Pardo MD  DATE: November 30, 2018  TIME: 1:51 PM

## 2018-12-03 ENCOUNTER — TELEPHONE (OUTPATIENT)
Dept: UROLOGY | Facility: AMBULATORY SURGERY CENTER | Age: 68
End: 2018-12-03

## 2018-12-03 NOTE — TELEPHONE ENCOUNTER
Spoke with patient, he said he had surgery on Friday with Dr Adriana Rees  He has a stent and needs to set up an appointment for removal  Patient said it's supposed to be removed in one week which is Friday   Please call him back

## 2018-12-06 DIAGNOSIS — N40.0 ENLARGED PROSTATE WITHOUT LOWER URINARY TRACT SYMPTOMS (LUTS): ICD-10-CM

## 2018-12-07 ENCOUNTER — CLINICAL SUPPORT (OUTPATIENT)
Dept: UROLOGY | Facility: MEDICAL CENTER | Age: 68
End: 2018-12-07
Payer: MEDICARE

## 2018-12-07 VITALS
HEART RATE: 91 BPM | HEIGHT: 75 IN | SYSTOLIC BLOOD PRESSURE: 140 MMHG | DIASTOLIC BLOOD PRESSURE: 78 MMHG | BODY MASS INDEX: 34.94 KG/M2 | WEIGHT: 281 LBS

## 2018-12-07 DIAGNOSIS — C67.8 MALIGNANT NEOPLASM OF OVERLAPPING SITES OF BLADDER (HCC): Primary | ICD-10-CM

## 2018-12-07 PROCEDURE — 99211 OFF/OP EST MAY X REQ PHY/QHP: CPT

## 2018-12-07 NOTE — PROGRESS NOTES
Patient returns for Miguel removal post TURBT  Miguel removed without difficulty, patient tolerated procedure well  Urine draining clear yellow  Denies fever or urinary symptoms  Patient instructed to increase fluids, especially water, and limit caffeine intake  To return to office if unable to void within 4-6 hours, or has increased discomfort

## 2018-12-12 ENCOUNTER — OFFICE VISIT (OUTPATIENT)
Dept: UROLOGY | Facility: MEDICAL CENTER | Age: 68
End: 2018-12-12
Payer: MEDICARE

## 2018-12-12 VITALS
SYSTOLIC BLOOD PRESSURE: 142 MMHG | WEIGHT: 282 LBS | DIASTOLIC BLOOD PRESSURE: 80 MMHG | BODY MASS INDEX: 35.06 KG/M2 | HEIGHT: 75 IN | HEART RATE: 90 BPM

## 2018-12-12 DIAGNOSIS — N40.0 BPH WITHOUT OBSTRUCTION/LOWER URINARY TRACT SYMPTOMS: ICD-10-CM

## 2018-12-12 DIAGNOSIS — Z85.528 HISTORY OF RENAL CELL CANCER: ICD-10-CM

## 2018-12-12 DIAGNOSIS — C67.8 MALIGNANT NEOPLASM OF OVERLAPPING SITES OF BLADDER (HCC): Primary | ICD-10-CM

## 2018-12-12 LAB
SL AMB  POCT GLUCOSE, UA: ABNORMAL
SL AMB LEUKOCYTE ESTERASE,UA: ABNORMAL
SL AMB POCT BILIRUBIN,UA: ABNORMAL
SL AMB POCT BLOOD,UA: ABNORMAL
SL AMB POCT CLARITY,UA: CLEAR
SL AMB POCT COLOR,UA: YELLOW
SL AMB POCT KETONES,UA: ABNORMAL
SL AMB POCT NITRITE,UA: ABNORMAL
SL AMB POCT PH,UA: 6
SL AMB POCT SPECIFIC GRAVITY,UA: 1.02
SL AMB POCT URINE PROTEIN: ABNORMAL
SL AMB POCT UROBILINOGEN: 0.2

## 2018-12-12 PROCEDURE — 81003 URINALYSIS AUTO W/O SCOPE: CPT | Performed by: UROLOGY

## 2018-12-12 PROCEDURE — 99214 OFFICE O/P EST MOD 30 MIN: CPT | Performed by: UROLOGY

## 2018-12-12 NOTE — PROGRESS NOTES
Assessment/Plan:  1  Bladder cancer-no evidence of recurrence-continue 1 more year of Q three-month weekly x3 BCG intravesical treatments with repeat cystoscopy in 6 months along with urinary cytology    2  History of right renal cell carcinoma status post right partial nephrectomy 2013-repeat abdominal ultrasound chest x-ray and comprehensive metabolic panel in 6 months    3  BPH-the patient notes no significant obstructive symptoms off of Uroxatral and will remain off of that medication at this point  PVR will be measured along with AUA symptom score upon return  No problem-specific Assessment & Plan notes found for this encounter  Diagnoses and all orders for this visit:    Malignant neoplasm of overlapping sites of bladder ([facility] [address] )  -     POCT urine dip auto non-scope  -     Cystoscopy; Future  -     Cytology, urine; Future  -     PSA Total, Diagnostic; Future  -     Comprehensive metabolic panel; Future  -     POCT Measure PVR; Future    History of renal cell cancer  -     US kidney and bladder with pvr; Future  -     XR chest pa & lateral; Future    BPH without obstruction/lower urinary tract symptoms          Subjective:      Patient ID: Jaycee Marie is a 76 y o  male  Chief complaint:  History of renal cell carcinoma bladder cancer and BPH    History of present illness 43-year-old male who is status post TURBT of a posterior wall bladder lesion  He presents to review pathology and establish a forward plan for this as well as his renal cell carcinoma history and BPH  With regard to his TURBT the patient's pathology report did not show any evidence of dysplasia or malignancy  Repeat cystoscopy is suggested for 6 months  The patient's last bladder cancer was low-grade papillary and noninvasive in December of 2017 with the patient undergoing intravesical BCG therapy over the past year    It is planned that he will continue maintenance BCG therapy over the next year with 3 weekly treatments every 3 months  With regard to the patient's voiding pattern, he has discontinued Uroxatral and notes now that he has no appreciable nocturia no significant urinary frequency urgency and voids with a good urinary stream with feelings of complete bladder emptying  PVR will be measured along with AUA symptom score in 6 months but at the present time the patient will stay off Uroxatral    With regard to the patient's renal cell carcinoma there has been no evidence of recurrence  The following portions of the patient's history were reviewed and updated as appropriate: allergies, current medications, past family history, past medical history, past social history, past surgical history and problem list     Review of Systems   Constitutional: Negative  HENT: Negative  Respiratory: Negative  Cardiovascular: Negative  Gastrointestinal: Negative  Endocrine: Negative  Genitourinary:        See HPI   Musculoskeletal: Positive for arthralgias, back pain, gait problem, myalgias and neck pain  Allergic/Immunologic: Negative  Hematological: Negative  Psychiatric/Behavioral: Negative  All other systems reviewed and are negative  Objective:      /80 (BP Location: Left arm, Patient Position: Sitting)   Pulse 90   Ht 6' 3" (1 905 m)   Wt 128 kg (282 lb)   BMI 35 25 kg/m²          Physical Exam   Constitutional: He is oriented to person, place, and time  He appears well-developed and well-nourished  No distress  HENT:   Head: Normocephalic and atraumatic  Eyes: Conjunctivae and EOM are normal    Neck: Neck supple  Pulmonary/Chest: Effort normal  No respiratory distress  Abdominal: Soft  He exhibits no distension  Neurological: He is alert and oriented to person, place, and time  Psychiatric: He has a normal mood and affect  His behavior is normal  Judgment and thought content normal    Vitals reviewed

## 2018-12-12 NOTE — LETTER
December 12, 2018     Mariza Bowles, [address] [employer]    Patient: Aileen Browning   YOB: 1950   Date of Visit: 12/12/2018       Dear Dr Kelly Mack:    Thank you for referring Bernabe Boast to me for evaluation  Below are my notes for this consultation  If you have questions, please do not hesitate to call me  I look forward to following your patient along with you  Sincerely,        Lake Hua MD        CC: No Recipients  Lake Hua MD  12/12/2018  1:46 PM  Sign at close encounter  Assessment/Plan:  1  Bladder cancer-no evidence of recurrence-continue 1 more year of Q three-month weekly x3 BCG intravesical treatments with repeat cystoscopy in 6 months along with urinary cytology    2  History of right renal cell carcinoma status post right partial nephrectomy 2013-repeat abdominal ultrasound chest x-ray and comprehensive metabolic panel in 6 months    3  BPH-the patient notes no significant obstructive symptoms off of Uroxatral and will remain off of that medication at this point  PVR will be measured along with AUA symptom score upon return  No problem-specific Assessment & Plan notes found for this encounter  Diagnoses and all orders for this visit:    Malignant neoplasm of overlapping sites of bladder ([facility] [address] )  -     POCT urine dip auto non-scope  -     Cystoscopy; Future  -     Cytology, urine; Future  -     PSA Total, Diagnostic; Future  -     Comprehensive metabolic panel; Future  -     POCT Measure PVR; Future    History of renal cell cancer  -     US kidney and bladder with pvr; Future  -     XR chest pa & lateral; Future    BPH without obstruction/lower urinary tract symptoms          Subjective:      Patient ID: Aileen Browning is a 76 y o  male  Chief complaint:  History of renal cell carcinoma bladder cancer and BPH    History of present illness 66-year-old male who is status post TURBT of a posterior wall bladder lesion  He presents to review pathology and establish a forward plan for this as well as his renal cell carcinoma history and BPH  With regard to his TURBT the patient's pathology report did not show any evidence of dysplasia or malignancy  Repeat cystoscopy is suggested for 6 months  The patient's last bladder cancer was low-grade papillary and noninvasive in December of 2017 with the patient undergoing intravesical BCG therapy over the past year  It is planned that he will continue maintenance BCG therapy over the next year with 3 weekly treatments every 3 months  With regard to the patient's voiding pattern, he has discontinued Uroxatral and notes now that he has no appreciable nocturia no significant urinary frequency urgency and voids with a good urinary stream with feelings of complete bladder emptying  PVR will be measured along with AUA symptom score in 6 months but at the present time the patient will stay off Uroxatral    With regard to the patient's renal cell carcinoma there has been no evidence of recurrence  The following portions of the patient's history were reviewed and updated as appropriate: allergies, current medications, past family history, past medical history, past social history, past surgical history and problem list     Review of Systems   Constitutional: Negative  HENT: Negative  Respiratory: Negative  Cardiovascular: Negative  Gastrointestinal: Negative  Endocrine: Negative  Genitourinary:        See HPI   Musculoskeletal: Positive for arthralgias, back pain, gait problem, myalgias and neck pain  Allergic/Immunologic: Negative  Hematological: Negative  Psychiatric/Behavioral: Negative  All other systems reviewed and are negative          Objective:      /80 (BP Location: Left arm, Patient Position: Sitting)   Pulse 90   Ht 6' 3" (1 905 m)   Wt 128 kg (282 lb)   BMI 35 25 kg/m²           Physical Exam   Constitutional: He is oriented to person, place, and time  He appears well-developed and well-nourished  No distress  HENT:   Head: Normocephalic and atraumatic  Eyes: Conjunctivae and EOM are normal    Neck: Neck supple  Pulmonary/Chest: Effort normal  No respiratory distress  Abdominal: Soft  He exhibits no distension  Neurological: He is alert and oriented to person, place, and time  Psychiatric: He has a normal mood and affect  His behavior is normal  Judgment and thought content normal    Vitals reviewed

## 2018-12-17 ENCOUNTER — TELEPHONE (OUTPATIENT)
Dept: UROLOGY | Facility: MEDICAL CENTER | Age: 68
End: 2018-12-17

## 2018-12-17 NOTE — TELEPHONE ENCOUNTER
Pt needs to be set up for BCG x's 3 first or second week in January  Presently not home  Wife will give him the message

## 2018-12-19 DIAGNOSIS — N30.90 CYSTITIS: Primary | ICD-10-CM

## 2018-12-19 NOTE — TELEPHONE ENCOUNTER
Pt sched for BCG x's 3 beginning 1/2/19  Culture to be done 12/28 [facility] lab  After completing will be due for 3 more weekly treatments in 3 months with cysto appt to follow 4-6 weeks post treatment

## 2018-12-27 ENCOUNTER — APPOINTMENT (OUTPATIENT)
Dept: LAB | Facility: CLINIC | Age: 68
End: 2018-12-27
Payer: MEDICARE

## 2018-12-27 DIAGNOSIS — N30.90 CYSTITIS: ICD-10-CM

## 2018-12-27 PROCEDURE — 87086 URINE CULTURE/COLONY COUNT: CPT

## 2018-12-28 LAB — BACTERIA UR CULT: NORMAL

## 2019-01-02 ENCOUNTER — PROCEDURE VISIT (OUTPATIENT)
Dept: UROLOGY | Facility: MEDICAL CENTER | Age: 69
End: 2019-01-02
Payer: MEDICARE

## 2019-01-02 VITALS — WEIGHT: 282 LBS | HEART RATE: 116 BPM | TEMPERATURE: 97.2 F | HEIGHT: 75 IN | BODY MASS INDEX: 35.06 KG/M2

## 2019-01-02 DIAGNOSIS — C67.4 MALIGNANT NEOPLASM OF POSTERIOR WALL OF URINARY BLADDER (HCC): Primary | ICD-10-CM

## 2019-01-02 LAB
SL AMB  POCT GLUCOSE, UA: NEGATIVE
SL AMB LEUKOCYTE ESTERASE,UA: NEGATIVE
SL AMB POCT BILIRUBIN,UA: NEGATIVE
SL AMB POCT BLOOD,UA: ABNORMAL
SL AMB POCT CLARITY,UA: CLEAR
SL AMB POCT COLOR,UA: ABNORMAL
SL AMB POCT KETONES,UA: NEGATIVE
SL AMB POCT NITRITE,UA: NEGATIVE
SL AMB POCT PH,UA: 6.5
SL AMB POCT SPECIFIC GRAVITY,UA: 1.02
SL AMB POCT URINE PROTEIN: ABNORMAL
SL AMB POCT UROBILINOGEN: ABNORMAL

## 2019-01-02 PROCEDURE — 51720 TREATMENT OF BLADDER LESION: CPT

## 2019-01-02 PROCEDURE — 81003 URINALYSIS AUTO W/O SCOPE: CPT

## 2019-01-02 NOTE — PROGRESS NOTES
Bladder instillation     Date/Time 1/2/2019 2:54 PM     Performed by  JEFF MACKEY     Authorized by Arun Cornejo       Consent: Verbal consent obtained  Written consent obtained  Risks and benefits: risks, benefits and alternatives were discussed  Consent given by: patient  Patient understanding: patient states understanding of the procedure being performed  Patient consent: the patient's understanding of the procedure matches consent given  Procedure consent: procedure consent matches procedure scheduled  Patient identity confirmed: verbally with patient      Preparation: Patient was prepped and draped in the usual sterile fashion  Site preparation: Betadine    Local anesthesia used: yes     Anesthesia   Local anesthesia used: yes  Local Anesthetic: lidocaine 2% without epinephrine     Procedure Details   Procedure Notes: Risks, benefits and some of the potential complications of the procedure were discussed at length with the patient including infection, bleeding, voiding discomfort, urinary retention, fever, chills, sepsis and others  All questions were answered  Informed consent was obtained  Sterile technique and intraurethral analgesia were used  A #14F red rubber coude-tip catheter was gently placed through the urethra and into the patient's bladder  The bladder was emptied of all urine  Approximately 50 cc's of BCG was instilled into the bladder via gravity  The catheter was removed  The patient was instructed not to empty their bladder for two hours  The procedure was tolerated well without complications The patient was instructed to call the office for bloody urine, difficulty urinating, painful urination, fever, chills, nausea, vomiting  The patient stated they understood these instructions and would comply      Patient tolerance: Patient tolerated the procedure well with no immediate complications

## 2019-01-02 NOTE — PROGRESS NOTES
I have reviewed the notes, assessments, and/or procedures performed by the nurse, I concur with her/his documentation of Pervis Ser

## 2019-01-09 ENCOUNTER — PROCEDURE VISIT (OUTPATIENT)
Dept: UROLOGY | Facility: MEDICAL CENTER | Age: 69
End: 2019-01-09
Payer: MEDICARE

## 2019-01-09 VITALS — WEIGHT: 282 LBS | HEART RATE: 84 BPM | BODY MASS INDEX: 35.06 KG/M2 | HEIGHT: 75 IN | TEMPERATURE: 97.5 F

## 2019-01-09 DIAGNOSIS — C67.8 MALIGNANT NEOPLASM OF OVERLAPPING SITES OF BLADDER (HCC): Primary | ICD-10-CM

## 2019-01-09 LAB
SL AMB  POCT GLUCOSE, UA: NEGATIVE
SL AMB LEUKOCYTE ESTERASE,UA: ABNORMAL
SL AMB POCT BILIRUBIN,UA: ABNORMAL
SL AMB POCT BLOOD,UA: ABNORMAL
SL AMB POCT CLARITY,UA: CLEAR
SL AMB POCT COLOR,UA: ABNORMAL
SL AMB POCT KETONES,UA: ABNORMAL
SL AMB POCT NITRITE,UA: NEGATIVE
SL AMB POCT PH,UA: 6
SL AMB POCT SPECIFIC GRAVITY,UA: 1.02
SL AMB POCT URINE PROTEIN: ABNORMAL
SL AMB POCT UROBILINOGEN: ABNORMAL

## 2019-01-09 PROCEDURE — 81003 URINALYSIS AUTO W/O SCOPE: CPT

## 2019-01-09 PROCEDURE — 51720 TREATMENT OF BLADDER LESION: CPT

## 2019-01-09 NOTE — PROGRESS NOTES
Risks, benefits and some of the potential complications of the procedure were discussed at length with the patient including infection, bleeding, voiding discomfort, urinary retention, fever, chills, sepsis and others  All questions were answered  Informed consent was obtained  Sterile technique and intraurethral analgesia were used  A #14 Malay red rubber coude'-tip catheter was gently placed through the urethra and into the patient's bladder  The bladder was emptied of all urine  Approximately 50 cc's of BCG was instilled into the bladder via syringe push  The catheter was removed  The patient was instructed not to empty their bladder for two hours  The procedure was tolerated well without complications The patient was instructed to call the office for bloody urine, difficulty urinating, painful urination, fever, chills, nausea, vomiting  The patient stated they understood these instructions and would comply

## 2019-01-16 ENCOUNTER — PROCEDURE VISIT (OUTPATIENT)
Dept: UROLOGY | Facility: MEDICAL CENTER | Age: 69
End: 2019-01-16
Payer: MEDICARE

## 2019-01-16 VITALS — WEIGHT: 282 LBS | HEIGHT: 75 IN | HEART RATE: 115 BPM | TEMPERATURE: 97.3 F | BODY MASS INDEX: 35.06 KG/M2

## 2019-01-16 DIAGNOSIS — C67.8 MALIGNANT NEOPLASM OF OVERLAPPING SITES OF BLADDER (HCC): ICD-10-CM

## 2019-01-16 DIAGNOSIS — N30.90 CYSTITIS: Primary | ICD-10-CM

## 2019-01-16 LAB
SL AMB  POCT GLUCOSE, UA: NEGATIVE
SL AMB LEUKOCYTE ESTERASE,UA: ABNORMAL
SL AMB POCT BILIRUBIN,UA: NEGATIVE
SL AMB POCT BLOOD,UA: ABNORMAL
SL AMB POCT CLARITY,UA: CLEAR
SL AMB POCT COLOR,UA: ABNORMAL
SL AMB POCT KETONES,UA: NEGATIVE
SL AMB POCT NITRITE,UA: NEGATIVE
SL AMB POCT PH,UA: 7
SL AMB POCT SPECIFIC GRAVITY,UA: 1.02
SL AMB POCT URINE PROTEIN: ABNORMAL
SL AMB POCT UROBILINOGEN: NEGATIVE

## 2019-01-16 PROCEDURE — 51720 TREATMENT OF BLADDER LESION: CPT

## 2019-01-16 PROCEDURE — 81003 URINALYSIS AUTO W/O SCOPE: CPT

## 2019-01-16 NOTE — PROGRESS NOTES
Risks, benefits and some of the potential complications of the procedure were discussed at length with the patient including infection, bleeding, voiding discomfort, urinary retention, fever, chills, sepsis and others  All questions were answered  Informed consent was obtained  Sterile technique and intraurethral analgesia were used  A #14F red rubber coude'-tip catheter was gently placed through the urethra and into the patient's bladder  The bladder was emptied of all urine  Approximately 50 cc's of BCG was instilled into the bladder via syringe push  The catheter was removed  The patient was instructed not to empty their bladder for two hours  The procedure was tolerated well without complications The patient was instructed to call the office for bloody urine, difficulty urinating, painful urination, fever, chills, nausea, vomiting  The patient stated they understood these instructions and would comply

## 2019-02-05 ENCOUNTER — TELEPHONE (OUTPATIENT)
Dept: UROLOGY | Facility: MEDICAL CENTER | Age: 69
End: 2019-02-05

## 2019-02-05 DIAGNOSIS — N30.90 CYSTITIS: Primary | ICD-10-CM

## 2019-02-05 NOTE — TELEPHONE ENCOUNTER
Pt set for BCG x's 3 treatments, 4/3-4/10 and 4/17  Urine culture to be done at least a week prior  Order in [facility], pt will have done at [facility]

## 2019-02-05 NOTE — TELEPHONE ENCOUNTER
Patient left message to ask Rommel Roland to please make his infusion appointments for the month of April    Please advise [phone]

## 2019-03-27 ENCOUNTER — APPOINTMENT (OUTPATIENT)
Dept: LAB | Facility: CLINIC | Age: 69
End: 2019-03-27
Payer: MEDICARE

## 2019-03-27 DIAGNOSIS — N30.90 CYSTITIS: ICD-10-CM

## 2019-03-27 PROCEDURE — 87086 URINE CULTURE/COLONY COUNT: CPT

## 2019-03-28 LAB — BACTERIA UR CULT: NORMAL

## 2019-04-03 ENCOUNTER — PROCEDURE VISIT (OUTPATIENT)
Dept: UROLOGY | Facility: MEDICAL CENTER | Age: 69
End: 2019-04-03
Payer: MEDICARE

## 2019-04-03 VITALS — BODY MASS INDEX: 35.06 KG/M2 | WEIGHT: 282 LBS | HEART RATE: 97 BPM | TEMPERATURE: 97 F | HEIGHT: 75 IN

## 2019-04-03 DIAGNOSIS — C67.8 MALIGNANT NEOPLASM OF OVERLAPPING SITES OF BLADDER (HCC): Primary | ICD-10-CM

## 2019-04-03 LAB
SL AMB  POCT GLUCOSE, UA: NEGATIVE
SL AMB LEUKOCYTE ESTERASE,UA: ABNORMAL
SL AMB POCT BILIRUBIN,UA: NEGATIVE
SL AMB POCT BLOOD,UA: ABNORMAL
SL AMB POCT CLARITY,UA: CLEAR
SL AMB POCT COLOR,UA: ABNORMAL
SL AMB POCT KETONES,UA: NEGATIVE
SL AMB POCT NITRITE,UA: NEGATIVE
SL AMB POCT PH,UA: 7
SL AMB POCT SPECIFIC GRAVITY,UA: 1.02
SL AMB POCT URINE PROTEIN: NEGATIVE
SL AMB POCT UROBILINOGEN: ABNORMAL

## 2019-04-03 PROCEDURE — 81003 URINALYSIS AUTO W/O SCOPE: CPT | Performed by: UROLOGY

## 2019-04-03 PROCEDURE — 51720 TREATMENT OF BLADDER LESION: CPT

## 2019-04-10 ENCOUNTER — PROCEDURE VISIT (OUTPATIENT)
Dept: UROLOGY | Facility: MEDICAL CENTER | Age: 69
End: 2019-04-10
Payer: MEDICARE

## 2019-04-10 VITALS — WEIGHT: 282 LBS | HEIGHT: 75 IN | TEMPERATURE: 97.2 F | BODY MASS INDEX: 35.06 KG/M2 | HEART RATE: 98 BPM

## 2019-04-10 DIAGNOSIS — C67.8 MALIGNANT NEOPLASM OF OVERLAPPING SITES OF BLADDER (HCC): Primary | ICD-10-CM

## 2019-04-10 LAB
SL AMB  POCT GLUCOSE, UA: NEGATIVE
SL AMB LEUKOCYTE ESTERASE,UA: ABNORMAL
SL AMB POCT BILIRUBIN,UA: NEGATIVE
SL AMB POCT BLOOD,UA: ABNORMAL
SL AMB POCT CLARITY,UA: CLEAR
SL AMB POCT COLOR,UA: ABNORMAL
SL AMB POCT KETONES,UA: NEGATIVE
SL AMB POCT NITRITE,UA: POSITIVE
SL AMB POCT PH,UA: 6
SL AMB POCT SPECIFIC GRAVITY,UA: 1.02
SL AMB POCT URINE PROTEIN: ABNORMAL
SL AMB POCT UROBILINOGEN: ABNORMAL

## 2019-04-10 PROCEDURE — 81003 URINALYSIS AUTO W/O SCOPE: CPT

## 2019-04-10 PROCEDURE — 51720 TREATMENT OF BLADDER LESION: CPT

## 2019-04-17 ENCOUNTER — PROCEDURE VISIT (OUTPATIENT)
Dept: UROLOGY | Facility: MEDICAL CENTER | Age: 69
End: 2019-04-17
Payer: MEDICARE

## 2019-04-17 VITALS — BODY MASS INDEX: 35.06 KG/M2 | HEART RATE: 84 BPM | WEIGHT: 282 LBS | HEIGHT: 75 IN | TEMPERATURE: 97.6 F

## 2019-04-17 DIAGNOSIS — C67.8 MALIGNANT NEOPLASM OF OVERLAPPING SITES OF BLADDER (HCC): Primary | ICD-10-CM

## 2019-04-17 PROCEDURE — 51720 TREATMENT OF BLADDER LESION: CPT

## 2019-04-17 PROCEDURE — 81003 URINALYSIS AUTO W/O SCOPE: CPT

## 2019-06-11 ENCOUNTER — APPOINTMENT (OUTPATIENT)
Dept: LAB | Facility: CLINIC | Age: 69
End: 2019-06-11
Payer: MEDICARE

## 2019-06-11 DIAGNOSIS — C67.8 MALIGNANT NEOPLASM OF OVERLAPPING SITES OF BLADDER (HCC): ICD-10-CM

## 2019-06-11 PROCEDURE — 88112 CYTOPATH CELL ENHANCE TECH: CPT | Performed by: PATHOLOGY

## 2019-06-12 ENCOUNTER — HOSPITAL ENCOUNTER (OUTPATIENT)
Dept: ULTRASOUND IMAGING | Facility: HOSPITAL | Age: 69
Discharge: HOME | End: 2019-06-12
Attending: UROLOGY
Payer: MEDICARE

## 2019-06-12 ENCOUNTER — APPOINTMENT (OUTPATIENT)
Dept: LAB | Facility: CLINIC | Age: 69
End: 2019-06-12
Payer: MEDICARE

## 2019-06-12 ENCOUNTER — HOSPITAL ENCOUNTER (OUTPATIENT)
Dept: RADIOLOGY | Facility: HOSPITAL | Age: 69
Discharge: HOME | End: 2019-06-12
Attending: UROLOGY
Payer: MEDICARE

## 2019-06-12 ENCOUNTER — TELEPHONE (OUTPATIENT)
Dept: SURGERY | Facility: CLINIC | Age: 69
End: 2019-06-12

## 2019-06-12 DIAGNOSIS — Z85.528 HISTORY OF RENAL CELL CANCER: ICD-10-CM

## 2019-06-12 DIAGNOSIS — C67.8 MALIGNANT NEOPLASM OF OVERLAPPING SITES OF BLADDER (HCC): ICD-10-CM

## 2019-06-12 LAB
ALBUMIN SERPL BCP-MCNC: 3.6 G/DL (ref 3.5–5)
ALP SERPL-CCNC: 102 U/L (ref 46–116)
ALT SERPL W P-5'-P-CCNC: 23 U/L (ref 12–78)
ANION GAP SERPL CALCULATED.3IONS-SCNC: 6 MMOL/L (ref 4–13)
AST SERPL W P-5'-P-CCNC: 23 U/L (ref 5–45)
BILIRUB SERPL-MCNC: 0.27 MG/DL (ref 0.2–1)
BUN SERPL-MCNC: 10 MG/DL (ref 5–25)
CALCIUM SERPL-MCNC: 8.5 MG/DL (ref 8.3–10.1)
CHLORIDE SERPL-SCNC: 107 MMOL/L (ref 100–108)
CO2 SERPL-SCNC: 25 MMOL/L (ref 21–32)
CREAT SERPL-MCNC: 0.94 MG/DL (ref 0.6–1.3)
GFR SERPL CREATININE-BSD FRML MDRD: 83 ML/MIN/1.73SQ M
GLUCOSE P FAST SERPL-MCNC: 96 MG/DL (ref 65–99)
POTASSIUM SERPL-SCNC: 4.6 MMOL/L (ref 3.5–5.3)
PROT SERPL-MCNC: 7.4 G/DL (ref 6.4–8.2)
PSA SERPL-MCNC: 1.4 NG/ML (ref 0–4)
SODIUM SERPL-SCNC: 138 MMOL/L (ref 136–145)

## 2019-06-12 PROCEDURE — 71046 X-RAY EXAM CHEST 2 VIEWS: CPT

## 2019-06-12 PROCEDURE — 36415 COLL VENOUS BLD VENIPUNCTURE: CPT

## 2019-06-12 PROCEDURE — 80053 COMPREHEN METABOLIC PANEL: CPT

## 2019-06-12 PROCEDURE — 76770 US EXAM ABDO BACK WALL COMP: CPT

## 2019-06-12 PROCEDURE — 84153 ASSAY OF PSA TOTAL: CPT

## 2019-06-27 ENCOUNTER — PROCEDURE VISIT (OUTPATIENT)
Dept: UROLOGY | Facility: MEDICAL CENTER | Age: 69
End: 2019-06-27
Payer: MEDICARE

## 2019-06-27 VITALS — HEIGHT: 75 IN | WEIGHT: 282 LBS | BODY MASS INDEX: 35.06 KG/M2

## 2019-06-27 DIAGNOSIS — Z85.528 HISTORY OF RENAL CELL CANCER: ICD-10-CM

## 2019-06-27 DIAGNOSIS — N40.0 BENIGN PROSTATIC HYPERPLASIA WITHOUT LOWER URINARY TRACT SYMPTOMS: ICD-10-CM

## 2019-06-27 DIAGNOSIS — C67.8 MALIGNANT NEOPLASM OF OVERLAPPING SITES OF BLADDER (HCC): Primary | ICD-10-CM

## 2019-06-27 DIAGNOSIS — N99.111 POSTPROCEDURAL BULBOUS URETHRAL STRICTURE: ICD-10-CM

## 2019-06-27 LAB
SL AMB  POCT GLUCOSE, UA: ABNORMAL
SL AMB LEUKOCYTE ESTERASE,UA: ABNORMAL
SL AMB POCT BILIRUBIN,UA: ABNORMAL
SL AMB POCT BLOOD,UA: ABNORMAL
SL AMB POCT CLARITY,UA: CLEAR
SL AMB POCT COLOR,UA: ABNORMAL
SL AMB POCT KETONES,UA: ABNORMAL
SL AMB POCT NITRITE,UA: ABNORMAL
SL AMB POCT PH,UA: 6
SL AMB POCT SPECIFIC GRAVITY,UA: 1.02
SL AMB POCT URINE PROTEIN: ABNORMAL
SL AMB POCT UROBILINOGEN: 0.2

## 2019-06-27 PROCEDURE — 88112 CYTOPATH CELL ENHANCE TECH: CPT | Performed by: PATHOLOGY

## 2019-06-27 PROCEDURE — 81003 URINALYSIS AUTO W/O SCOPE: CPT | Performed by: UROLOGY

## 2019-06-27 PROCEDURE — 1123F ACP DISCUSS/DSCN MKR DOCD: CPT | Performed by: UROLOGY

## 2019-06-27 PROCEDURE — 99214 OFFICE O/P EST MOD 30 MIN: CPT | Performed by: UROLOGY

## 2019-06-27 PROCEDURE — 52281 CYSTOSCOPY AND TREATMENT: CPT | Performed by: UROLOGY

## 2019-06-27 RX ORDER — ACETAMINOPHEN 325 MG/1
650 TABLET ORAL EVERY 6 HOURS PRN
COMMUNITY

## 2019-06-27 RX ORDER — CIPROFLOXACIN 250 MG/1
250 TABLET, FILM COATED ORAL EVERY 12 HOURS SCHEDULED
Qty: 4 TABLET | Refills: 0 | Status: SHIPPED | OUTPATIENT
Start: 2019-06-27 | End: 2019-06-29

## 2019-12-18 ENCOUNTER — TRANSCRIBE ORDERS (OUTPATIENT)
Dept: LAB | Facility: CLINIC | Age: 69
End: 2019-12-18

## 2019-12-26 ENCOUNTER — PROCEDURE VISIT (OUTPATIENT)
Dept: UROLOGY | Facility: MEDICAL CENTER | Age: 69
End: 2019-12-26
Payer: MEDICARE

## 2019-12-26 VITALS
SYSTOLIC BLOOD PRESSURE: 142 MMHG | HEIGHT: 75 IN | BODY MASS INDEX: 34.32 KG/M2 | WEIGHT: 276 LBS | DIASTOLIC BLOOD PRESSURE: 84 MMHG | HEART RATE: 84 BPM

## 2019-12-26 DIAGNOSIS — C67.8 MALIGNANT NEOPLASM OF OVERLAPPING SITES OF BLADDER (HCC): Primary | ICD-10-CM

## 2019-12-26 DIAGNOSIS — N99.111 POSTPROCEDURAL BULBOUS URETHRAL STRICTURE: ICD-10-CM

## 2019-12-26 DIAGNOSIS — Z85.528 HISTORY OF RENAL CELL CANCER: ICD-10-CM

## 2019-12-26 DIAGNOSIS — N40.0 BPH WITHOUT OBSTRUCTION/LOWER URINARY TRACT SYMPTOMS: ICD-10-CM

## 2019-12-26 LAB
SL AMB  POCT GLUCOSE, UA: NEGATIVE
SL AMB LEUKOCYTE ESTERASE,UA: NORMAL
SL AMB POCT BILIRUBIN,UA: NEGATIVE
SL AMB POCT BLOOD,UA: NEGATIVE
SL AMB POCT CLARITY,UA: CLEAR
SL AMB POCT COLOR,UA: YELLOW
SL AMB POCT KETONES,UA: NEGATIVE
SL AMB POCT NITRITE,UA: NEGATIVE
SL AMB POCT PH,UA: 7
SL AMB POCT SPECIFIC GRAVITY,UA: 1.02
SL AMB POCT URINE PROTEIN: NEGATIVE
SL AMB POCT UROBILINOGEN: 1

## 2019-12-26 PROCEDURE — 81003 URINALYSIS AUTO W/O SCOPE: CPT | Performed by: UROLOGY

## 2019-12-26 PROCEDURE — 52281 CYSTOSCOPY AND TREATMENT: CPT | Performed by: UROLOGY

## 2019-12-26 PROCEDURE — 99214 OFFICE O/P EST MOD 30 MIN: CPT | Performed by: UROLOGY

## 2019-12-26 PROCEDURE — 87086 URINE CULTURE/COLONY COUNT: CPT | Performed by: UROLOGY

## 2019-12-26 RX ORDER — LISINOPRIL 5 MG/1
5 TABLET ORAL DAILY
COMMUNITY

## 2019-12-26 RX ORDER — CIPROFLOXACIN 500 MG/1
500 TABLET, FILM COATED ORAL EVERY 12 HOURS SCHEDULED
Qty: 4 TABLET | Refills: 0 | Status: SHIPPED | OUTPATIENT
Start: 2019-12-26 | End: 2019-12-28

## 2019-12-26 NOTE — PROGRESS NOTES
Assessment/Plan:  1  Bladder cancer-history of low-grade low stage papillary bladder cancer treated with 2 years of BCG with last tissue positive biopsy in 2017 December  Plan yearly cystoscopy with urinary cytology  2   History of right renal cell carcinoma status post right partial nephrectomy in 2013-no evidence of disease-repeat chest x-ray and renal ultrasound with chemistry profile in 1 year    3  BPH without lower urinary tract symptoms-PSA and EJ in 1 year    4  Superficial bulbar urethral stricture-dilated as needed at time of surveillance cystoscopy  No problem-specific Assessment & Plan notes found for this encounter  Diagnoses and all orders for this visit:    Malignant neoplasm of overlapping sites of bladder ([facility] [address] )  -     POCT urine dip auto non-scope  -     Comprehensive metabolic panel; Future    History of renal cell cancer  -     Comprehensive metabolic panel; Future    Postprocedural bulbous urethral stricture  -     Urine culture; Future  -     Comprehensive metabolic panel; Future    BPH without obstruction/lower urinary tract symptoms  -     Urine culture; Future  -     PSA Total, Diagnostic; Future  -     Comprehensive metabolic panel; Future  -     ciprofloxacin (CIPRO) 500 mg tablet; Take 1 tablet (500 mg total) by mouth every 12 (twelve) hours for 2 days    Other orders  -     lisinopril (ZESTRIL) 5 mg tablet; Take 5 mg by mouth daily          Subjective:      Patient ID: Maddie York is a 71 y o  male  HPI  24-year-old male well known to me with a history of low-grade papillary bladder tumor treated with BCG because of recurrence and multifocality  Patient presents for cystoscopy today  He denies any obstructive irritative voiding symptoms although he has a known deep bulbar urethral stricture  The patient does note a better urinary stream after dilation and somewhat of a decreased urinary stream after I while after dilation    He has a history of renal cell carcinoma status post partial nephrectomy in the past with no evidence of disease recurrence and a history of prostatic enlargement and urethral strictures noted above  The patient presents for cystoscopy dilation of the urethral stricture  The following portions of the patient's history were reviewed and updated as appropriate: allergies, current medications, past family history, past medical history, past social history, past surgical history and problem list     Review of Systems   Constitutional: Negative  HENT: Negative  Respiratory: Negative  Gastrointestinal: Negative  Genitourinary: Positive for urgency  Musculoskeletal: Positive for arthralgias and myalgias  Neurological: Negative  Psychiatric/Behavioral: Negative  Objective:      /84 (BP Location: Left arm, Patient Position: Sitting, Cuff Size: Standard)   Pulse 84   Ht 6' 3" (1 905 m)   Wt 125 kg (276 lb)   BMI 34 50 kg/m²          Physical Exam   Constitutional: He is oriented to person, place, and time  He appears well-developed and well-nourished  No distress  HENT:   Head: Atraumatic  Eyes: EOM are normal    Neck: Neck supple  Cardiovascular: Normal rate  Pulmonary/Chest: Effort normal  No respiratory distress  Abdominal: Soft  He exhibits no distension  Genitourinary: Penis normal    Neurological: He is alert and oriented to person, place, and time  Psychiatric: He has a normal mood and affect  His behavior is normal  Judgment and thought content normal    Vitals reviewed  Cystoscopy  Date/Time: 12/26/2019 1:50 PM  Performed by: Ezequiel Palomino MD  Authorized by: Ezequiel Palomino MD     Procedure details: cystoscopy    Patient tolerance: Patient tolerated the procedure well with no immediate complications    Additional Procedure Details:  The patient was placed supine on the examining table and after prepping his urethral meatus with Betadine flexible video cystourethroscopy took place after instillation of 2% lidocaine lubricant that was left indwelling in the urethral lumen for 2 minutes  Flexible video cystourethroscopy revealed a normal anterior urethra down to a deep bulbar urethral stricture that was not passable with the cystoscope  A filiforms and follower 25 [place] in size was used to dilate the urethral stricture with 1 pass atraumatically  That was removed and then the cystoscope replaced  There was no active bleeding and the urethra was now patent  The external sphincter sign was intact and the prostate revealed some bilateral lateral lobe tissue that was mildly obstructive to the bladder outlet however with a full bladder appeared to be nonobstructive  The urinary bladder was moderately trabeculated without intrinsic lesion or extrinsic mass compression  An area of previous resection on the right lateral wall of the bladder extending onto the base of the bladder was healing well without any evidence of visual recurrence of bladder cancer  After completion of cystourethroscopy  Cystoscope was removed under direct vision and the patient voided spontaneously  There were no complications and the patient left the office in good condition and will return in 1 year as indicated

## 2019-12-26 NOTE — LETTER
December 26, 2019     Marquis Ang MD  [address]  [address] [facility]    Patient: Saadia Perry   YOB: 1950   Date of Visit: 12/26/2019       Dear Dr Beverly Robbins:    Thank you for referring Kade Lema to me for evaluation  Below are my notes for this consultation  If you have questions, please do not hesitate to call me  I look forward to following your patient along with you  Sincerely,        Lakhwinder Garcia MD        CC: No Recipients  Lakhwinder Garcia MD  12/26/2019  1:52 PM  Sign at close encounter  Assessment/Plan:  1  Bladder cancer-history of low-grade low stage papillary bladder cancer treated with 2 years of BCG with last tissue positive biopsy in 2017 December  Plan yearly cystoscopy with urinary cytology  2   History of right renal cell carcinoma status post right partial nephrectomy in 2013-no evidence of disease-repeat chest x-ray and renal ultrasound with chemistry profile in 1 year    3  BPH without lower urinary tract symptoms-PSA and EJ in 1 year    4  Superficial bulbar urethral stricture-dilated as needed at time of surveillance cystoscopy  No problem-specific Assessment & Plan notes found for this encounter  Diagnoses and all orders for this visit:    Malignant neoplasm of overlapping sites of bladder ([facility] [address] )  -     POCT urine dip auto non-scope  -     Comprehensive metabolic panel; Future    History of renal cell cancer  -     Comprehensive metabolic panel; Future    Postprocedural bulbous urethral stricture  -     Urine culture; Future  -     Comprehensive metabolic panel; Future    BPH without obstruction/lower urinary tract symptoms  -     Urine culture; Future  -     PSA Total, Diagnostic; Future  -     Comprehensive metabolic panel; Future  -     ciprofloxacin (CIPRO) 500 mg tablet; Take 1 tablet (500 mg total) by mouth every 12 (twelve) hours for 2 days    Other orders  -     lisinopril (ZESTRIL) 5 mg tablet;  Take 5 mg by mouth daily          Subjective:      Patient ID: Brando Guerrero is a 71 y o  male  HPI  71-year-old male well known to me with a history of low-grade papillary bladder tumor treated with BCG because of recurrence and multifocality  Patient presents for cystoscopy today  He denies any obstructive irritative voiding symptoms although he has a known deep bulbar urethral stricture  The patient does note a better urinary stream after dilation and somewhat of a decreased urinary stream after I while after dilation  He has a history of renal cell carcinoma status post partial nephrectomy in the past with no evidence of disease recurrence and a history of prostatic enlargement and urethral strictures noted above  The patient presents for cystoscopy dilation of the urethral stricture  The following portions of the patient's history were reviewed and updated as appropriate: allergies, current medications, past family history, past medical history, past social history, past surgical history and problem list     Review of Systems   Constitutional: Negative  HENT: Negative  Respiratory: Negative  Gastrointestinal: Negative  Genitourinary: Positive for urgency  Musculoskeletal: Positive for arthralgias and myalgias  Neurological: Negative  Psychiatric/Behavioral: Negative  Objective:      /84 (BP Location: Left arm, Patient Position: Sitting, Cuff Size: Standard)   Pulse 84   Ht 6' 3" (1 905 m)   Wt 125 kg (276 lb)   BMI 34 50 kg/m²           Physical Exam   Constitutional: He is oriented to person, place, and time  He appears well-developed and well-nourished  No distress  HENT:   Head: Atraumatic  Eyes: EOM are normal    Neck: Neck supple  Cardiovascular: Normal rate  Pulmonary/Chest: Effort normal  No respiratory distress  Abdominal: Soft  He exhibits no distension  Genitourinary: Penis normal    Neurological: He is alert and oriented to person, place, and time  Psychiatric: He has a normal mood and affect  His behavior is normal  Judgment and thought content normal    Vitals reviewed  Cystoscopy  Date/Time: 12/26/2019 1:50 PM  Performed by: Merced Costa MD  Authorized by: Merced Costa MD     Procedure details: cystoscopy    Patient tolerance: Patient tolerated the procedure well with no immediate complications    Additional Procedure Details: The patient was placed supine on the examining table and after prepping his urethral meatus with Betadine flexible video cystourethroscopy took place after instillation of 2% lidocaine lubricant that was left indwelling in the urethral lumen for 2 minutes  Flexible video cystourethroscopy revealed a normal anterior urethra down to a deep bulbar urethral stricture that was not passable with the cystoscope  A filiforms and follower 25 [place] in size was used to dilate the urethral stricture with 1 pass atraumatically  That was removed and then the cystoscope replaced  There was no active bleeding and the urethra was now patent  The external sphincter sign was intact and the prostate revealed some bilateral lateral lobe tissue that was mildly obstructive to the bladder outlet however with a full bladder appeared to be nonobstructive  The urinary bladder was moderately trabeculated without intrinsic lesion or extrinsic mass compression  An area of previous resection on the right lateral wall of the bladder extending onto the base of the bladder was healing well without any evidence of visual recurrence of bladder cancer  After completion of cystourethroscopy  Cystoscope was removed under direct vision and the patient voided spontaneously  There were no complications and the patient left the office in good condition and will return in 1 year as indicated

## 2019-12-29 LAB — BACTERIA UR CULT: ABNORMAL

## 2020-12-31 ENCOUNTER — PROCEDURE VISIT (OUTPATIENT)
Dept: UROLOGY | Facility: MEDICAL CENTER | Age: 70
End: 2020-12-31
Payer: MEDICARE

## 2020-12-31 VITALS — WEIGHT: 270 LBS | HEIGHT: 74 IN | BODY MASS INDEX: 34.65 KG/M2

## 2020-12-31 DIAGNOSIS — Z85.528 HISTORY OF RENAL CELL CANCER: ICD-10-CM

## 2020-12-31 DIAGNOSIS — C67.8 MALIGNANT NEOPLASM OF OVERLAPPING SITES OF BLADDER (HCC): Primary | ICD-10-CM

## 2020-12-31 DIAGNOSIS — N13.8 BPH WITH OBSTRUCTION/LOWER URINARY TRACT SYMPTOMS: ICD-10-CM

## 2020-12-31 DIAGNOSIS — N40.1 BPH WITH OBSTRUCTION/LOWER URINARY TRACT SYMPTOMS: ICD-10-CM

## 2020-12-31 DIAGNOSIS — N99.111 POSTPROCEDURAL BULBOUS URETHRAL STRICTURE: ICD-10-CM

## 2020-12-31 PROCEDURE — 99214 OFFICE O/P EST MOD 30 MIN: CPT | Performed by: UROLOGY

## 2020-12-31 PROCEDURE — 96372 THER/PROPH/DIAG INJ SC/IM: CPT | Performed by: UROLOGY

## 2020-12-31 PROCEDURE — 52281 CYSTOSCOPY AND TREATMENT: CPT | Performed by: UROLOGY

## 2020-12-31 RX ORDER — CIPROFLOXACIN 500 MG/1
500 TABLET, FILM COATED ORAL EVERY 12 HOURS SCHEDULED
Qty: 4 TABLET | Refills: 0 | Status: SHIPPED | OUTPATIENT
Start: 2020-12-31 | End: 2021-01-02

## 2020-12-31 RX ORDER — GENTAMICIN SULFATE 40 MG/ML
1 INJECTION, SOLUTION INTRAMUSCULAR; INTRAVENOUS ONCE
Status: COMPLETED | OUTPATIENT
Start: 2020-12-31 | End: 2020-12-31

## 2020-12-31 RX ORDER — LEVETIRACETAM 500 MG/1
500 TABLET ORAL 2 TIMES DAILY
COMMUNITY

## 2020-12-31 RX ORDER — LEVETIRACETAM 250 MG/1
TABLET ORAL
COMMUNITY
Start: 2020-11-16

## 2020-12-31 RX ADMIN — GENTAMICIN SULFATE 80 MG: 40 INJECTION, SOLUTION INTRAMUSCULAR; INTRAVENOUS at 13:58

## 2022-01-14 ENCOUNTER — TELEPHONE (OUTPATIENT)
Dept: UROLOGY | Facility: MEDICAL CENTER | Age: 72
End: 2022-01-14

## 2022-01-14 NOTE — TELEPHONE ENCOUNTER
Call placed to Patient and he states that he cancel his Radiology testings due to Covid and he wants to be cautious for his health reasons

## 2022-01-27 ENCOUNTER — PROCEDURE VISIT (OUTPATIENT)
Dept: UROLOGY | Facility: MEDICAL CENTER | Age: 72
End: 2022-01-27
Payer: MEDICARE

## 2022-01-27 VITALS
SYSTOLIC BLOOD PRESSURE: 138 MMHG | HEIGHT: 74 IN | DIASTOLIC BLOOD PRESSURE: 78 MMHG | WEIGHT: 248 LBS | BODY MASS INDEX: 31.83 KG/M2

## 2022-01-27 DIAGNOSIS — Z85.528 HISTORY OF RENAL CELL CANCER: ICD-10-CM

## 2022-01-27 DIAGNOSIS — N13.8 BPH WITH OBSTRUCTION/LOWER URINARY TRACT SYMPTOMS: ICD-10-CM

## 2022-01-27 DIAGNOSIS — N40.1 BPH WITH OBSTRUCTION/LOWER URINARY TRACT SYMPTOMS: ICD-10-CM

## 2022-01-27 DIAGNOSIS — C67.8 MALIGNANT NEOPLASM OF OVERLAPPING SITES OF BLADDER (HCC): Primary | ICD-10-CM

## 2022-01-27 LAB
SL AMB  POCT GLUCOSE, UA: ABNORMAL
SL AMB LEUKOCYTE ESTERASE,UA: ABNORMAL
SL AMB POCT BILIRUBIN,UA: ABNORMAL
SL AMB POCT BLOOD,UA: ABNORMAL
SL AMB POCT CLARITY,UA: CLEAR
SL AMB POCT COLOR,UA: YELLOW
SL AMB POCT KETONES,UA: ABNORMAL
SL AMB POCT NITRITE,UA: POSITIVE
SL AMB POCT PH,UA: 7
SL AMB POCT SPECIFIC GRAVITY,UA: 1.02
SL AMB POCT URINE PROTEIN: ABNORMAL
SL AMB POCT UROBILINOGEN: 1

## 2022-01-27 PROCEDURE — 81003 URINALYSIS AUTO W/O SCOPE: CPT | Performed by: UROLOGY

## 2022-01-27 PROCEDURE — 99214 OFFICE O/P EST MOD 30 MIN: CPT | Performed by: UROLOGY

## 2022-01-27 PROCEDURE — 52281 CYSTOSCOPY AND TREATMENT: CPT | Performed by: UROLOGY

## 2022-01-27 RX ORDER — CIPROFLOXACIN 500 MG/1
500 TABLET, FILM COATED ORAL EVERY 12 HOURS SCHEDULED
Qty: 4 TABLET | Refills: 0 | Status: SHIPPED | OUTPATIENT
Start: 2022-01-27 | End: 2022-01-29

## 2022-01-27 NOTE — LETTER
January 27, 2022     Jacobo Celeste, [address]  [facility]    Patient: Sherren Reasoner   YOB: 1950   Date of Visit: 1/27/2022       Dear Dr Grimaldo Self:    Thank you for referring Anne Saini to me for evaluation  Below are my notes for this consultation  If you have questions, please do not hesitate to call me  I look forward to following your patient along with you  Sincerely,        Magdy Morris MD        CC: No Recipients  Magdy Morris MD  1/27/2022  1:46 PM  Sign when Signing Visit  Assessment/Plan:  1  Bladder cancer-history of low-grade low stage papillary bladder cancer treated with resection and 2 years of BCG with last positive biopsy 2017-cystoscopy negative for recurrence today-repeat 1 year with urinary cytology    2  History of right renal cell carcinoma status post right partial nephrectomy in 2013  Patient has failed to obtain renal ultrasound and chest x-ray  This will be reordered for next year  Patient is aware that failure to comply that may result in occult disease being on identified  3  BPH without lower urinary tract symptoms-PSA and EJ in 1 year  Patient has not had a PSA since 09/18/2019 despite it being ordered  Patient was again advised to get a PSA blood test   Previous PSA in 2019 normal and EJ now is negative for any obvious malignancy or palpable nodules    4  Superficial bulbar urethral stricture-dilated at the time of cystoscopy today, very dense but not very symptomatic in terms of obstructive symptoms  No problem-specific Assessment & Plan notes found for this encounter  Diagnoses and all orders for this visit:    Malignant neoplasm of overlapping sites of bladder (HCC)  -     POCT urine dip auto non-scope  -     ciprofloxacin (CIPRO) 500 mg tablet; Take 1 tablet (500 mg total) by mouth every 12 (twelve) hours for 2 days  -     Comprehensive metabolic panel; Future  -     Cytology, urine;  Future  - Cystoscopy; Future    BPH with obstruction/lower urinary tract symptoms  -     PSA Total, Diagnostic; Future  -     Comprehensive metabolic panel; Future    History of renal cell cancer  -     Comprehensive metabolic panel; Future  -     US kidney and bladder with pvr; Future  -     XR chest pa & lateral; Future          Subjective:      Patient ID: Kavita Barrios is a 70 y o  male  HPI  80-year-old male well known to me with history of low-grade low stage papillary bladder tumor treated with 2 years of BCG therapy who presents for surveillance cystoscopy  He denies significant obstructive voiding symptoms and is known to have a bulbar urethral stricture  He has an AUA symptom score of only 3  He also has a history of right partial nephrectomy for low-grade low stage renal cell carcinoma but has failed to get renal ultrasound and chest x-ray in multiple years as well as not getting PSA in multiple years  The patient presents for follow-up  The following portions of the patient's history were reviewed and updated as appropriate: allergies, current medications, past family history, past medical history, past social history, past surgical history and problem list     Review of Systems   Genitourinary: Positive for frequency  AUA symptom score 3 with nocturia twice, 1/5 frequency   Musculoskeletal: Positive for arthralgias and myalgias  All other systems reviewed and are negative  Objective:      /78   Ht 6' 2" (1 88 m)   Wt 112 kg (248 lb)   BMI 31 84 kg/m²          Physical Exam  Vitals reviewed  Constitutional:       General: He is not in acute distress  Appearance: Normal appearance  He is obese  He is not ill-appearing, toxic-appearing or diaphoretic  HENT:      Head: Normocephalic and atraumatic  Nose: Nose normal       Mouth/Throat:      Mouth: Mucous membranes are moist    Eyes:      Extraocular Movements: Extraocular movements intact     Pulmonary:      Effort: Pulmonary effort is normal  No respiratory distress  Abdominal:      General: Bowel sounds are normal  There is no distension  Palpations: Abdomen is soft  Genitourinary:     Penis: Normal     Neurological:      Mental Status: He is alert and oriented to person, place, and time  Psychiatric:         Mood and Affect: Mood normal          Behavior: Behavior normal          Thought Content:  Thought content normal          Judgment: Judgment normal

## 2022-01-27 NOTE — PROGRESS NOTES
Assessment/Plan:  1  Bladder cancer-history of low-grade low stage papillary bladder cancer treated with resection and 2 years of BCG with last positive biopsy 2017-cystoscopy negative for recurrence today-repeat 1 year with urinary cytology    2  History of right renal cell carcinoma status post right partial nephrectomy in 2013  Patient has failed to obtain renal ultrasound and chest x-ray  This will be reordered for next year  Patient is aware that failure to comply that may result in occult disease being on identified  3  BPH without lower urinary tract symptoms-PSA and EJ in 1 year  Patient has not had a PSA since 09/18/2019 despite it being ordered  Patient was again advised to get a PSA blood test   Previous PSA in 2019 normal and EJ now is negative for any obvious malignancy or palpable nodules    4  Superficial bulbar urethral stricture-dilated at the time of cystoscopy today, very dense but not very symptomatic in terms of obstructive symptoms  No problem-specific Assessment & Plan notes found for this encounter  Diagnoses and all orders for this visit:    Malignant neoplasm of overlapping sites of bladder (HCC)  -     POCT urine dip auto non-scope  -     ciprofloxacin (CIPRO) 500 mg tablet; Take 1 tablet (500 mg total) by mouth every 12 (twelve) hours for 2 days  -     Comprehensive metabolic panel; Future  -     Cytology, urine; Future  -     Cystoscopy; Future    BPH with obstruction/lower urinary tract symptoms  -     PSA Total, Diagnostic; Future  -     Comprehensive metabolic panel; Future    History of renal cell cancer  -     Comprehensive metabolic panel; Future  -     US kidney and bladder with pvr; Future  -     XR chest pa & lateral; Future          Subjective:      Patient ID: Andreina Ovalles is a 70 y o  male      HPI  66-year-old male well known to me with history of low-grade low stage papillary bladder tumor treated with 2 years of BCG therapy who presents for surveillance cystoscopy  He denies significant obstructive voiding symptoms and is known to have a bulbar urethral stricture  He has an AUA symptom score of only 3  He also has a history of right partial nephrectomy for low-grade low stage renal cell carcinoma but has failed to get renal ultrasound and chest x-ray in multiple years as well as not getting PSA in multiple years  The patient presents for follow-up  The following portions of the patient's history were reviewed and updated as appropriate: allergies, current medications, past family history, past medical history, past social history, past surgical history and problem list     Review of Systems   Genitourinary: Positive for frequency  AUA symptom score 3 with nocturia twice, 1/5 frequency   Musculoskeletal: Positive for arthralgias and myalgias  All other systems reviewed and are negative  Objective:      /78   Ht 6' 2" (1 88 m)   Wt 112 kg (248 lb)   BMI 31 84 kg/m²          Physical Exam  Vitals reviewed  Constitutional:       General: He is not in acute distress  Appearance: Normal appearance  He is obese  He is not ill-appearing, toxic-appearing or diaphoretic  HENT:      Head: Normocephalic and atraumatic  Nose: Nose normal       Mouth/Throat:      Mouth: Mucous membranes are moist    Eyes:      Extraocular Movements: Extraocular movements intact  Pulmonary:      Effort: Pulmonary effort is normal  No respiratory distress  Abdominal:      General: Bowel sounds are normal  There is no distension  Palpations: Abdomen is soft  Genitourinary:     Penis: Normal     Neurological:      Mental Status: He is alert and oriented to person, place, and time  Psychiatric:         Mood and Affect: Mood normal          Behavior: Behavior normal          Thought Content:  Thought content normal          Judgment: Judgment normal                 Cystoscopy     Date/Time 1/27/2022 1:47 PM     Performed by  Terri Louis MD Authorized by Luisana Victor MD      Universal Protocol:  Consent: Verbal consent obtained  Written consent obtained  Risks and benefits: risks, benefits and alternatives were discussed  Consent given by: patient  Time out: Immediately prior to procedure a "time out" was called to verify the correct patient, procedure, equipment, support staff and site/side marked as required  Patient understanding: patient states understanding of the procedure being performed  Patient consent: the patient's understanding of the procedure matches consent given  Procedure consent: procedure consent matches procedure scheduled  Required items: required blood products, implants, devices, and special equipment available  Patient identity confirmed: verbally with patient        Procedure Details:  Procedure type: dilation of urethral stricture    Patient tolerance: Patient tolerated the procedure well with no immediate complications    Additional Procedure Details: With the patient the supine position after prepping urethral meatus with Betadine and instilling 2 percent lidocaine lubricant flexible cystoscopy took place  The anterior urethra was within normal limits without stricture lesion except for a very tight dense mid to deep bulbar urethral stricture  Filiforms and followers up to and including 25 [place] in size were used to sequentially dilate the urethral stricture  The urethral stricture is very dense and appeared to be somewhat tight however the patient denied any significant voiding problems related to that  After completion of the dilation the cystoscope was passed into the deeper urethra which otherwise was within normal limits without stricture lesion  The prostatic urethra appeared on obstructive with a patent bladder neck  The urinary bladder was mildly trabeculated without intrinsic lesion or extrinsic mass compression  There was mild trabeculation    Ureteral orifices were normal position and configuration bilaterally with clear efflux bilaterally  The cystoscope was removed atraumatically after retroflexion revealed no lesions around the bladder neck  The patient voided spontaneously with a good stream afterwards    There were no complications the patient recovered uneventfully

## 2022-06-01 LAB — HBA1C MFR BLD HPLC: 6.4 %

## 2023-01-16 ENCOUNTER — TELEPHONE (OUTPATIENT)
Dept: UROLOGY | Facility: MEDICAL CENTER | Age: 73
End: 2023-01-16

## 2023-01-16 NOTE — TELEPHONE ENCOUNTER
Spoke with pt to verify appt on Thursday for cystoscopy and to have PSA done prior to appt    Pt had PSA done at [facility] and will bring a copy of it

## 2023-01-19 ENCOUNTER — PROCEDURE VISIT (OUTPATIENT)
Dept: UROLOGY | Facility: MEDICAL CENTER | Age: 73
End: 2023-01-19

## 2023-01-19 VITALS
SYSTOLIC BLOOD PRESSURE: 150 MMHG | HEIGHT: 74 IN | DIASTOLIC BLOOD PRESSURE: 90 MMHG | HEART RATE: 86 BPM | WEIGHT: 248 LBS | BODY MASS INDEX: 31.83 KG/M2

## 2023-01-19 DIAGNOSIS — N13.8 BPH WITH OBSTRUCTION/LOWER URINARY TRACT SYMPTOMS: ICD-10-CM

## 2023-01-19 DIAGNOSIS — C67.8 MALIGNANT NEOPLASM OF OVERLAPPING SITES OF BLADDER (HCC): Primary | ICD-10-CM

## 2023-01-19 DIAGNOSIS — N40.1 BPH WITH OBSTRUCTION/LOWER URINARY TRACT SYMPTOMS: ICD-10-CM

## 2023-01-19 DIAGNOSIS — Z85.528 HISTORY OF RENAL CELL CANCER: ICD-10-CM

## 2023-01-19 LAB
SL AMB  POCT GLUCOSE, UA: ABNORMAL
SL AMB LEUKOCYTE ESTERASE,UA: ABNORMAL
SL AMB POCT BILIRUBIN,UA: ABNORMAL
SL AMB POCT BLOOD,UA: ABNORMAL
SL AMB POCT CLARITY,UA: CLEAR
SL AMB POCT COLOR,UA: YELLOW
SL AMB POCT KETONES,UA: ABNORMAL
SL AMB POCT NITRITE,UA: ABNORMAL
SL AMB POCT PH,UA: 6.5
SL AMB POCT SPECIFIC GRAVITY,UA: 1.02
SL AMB POCT URINE PROTEIN: ABNORMAL
SL AMB POCT UROBILINOGEN: 1

## 2023-01-19 RX ORDER — GENTAMICIN SULFATE 40 MG/ML
2 INJECTION, SOLUTION INTRAMUSCULAR; INTRAVENOUS ONCE
Status: DISCONTINUED | OUTPATIENT
Start: 2023-01-19 | End: 2023-01-19

## 2023-01-19 RX ORDER — GENTAMICIN SULFATE 40 MG/ML
1 INJECTION, SOLUTION INTRAMUSCULAR; INTRAVENOUS ONCE
Status: DISCONTINUED | OUTPATIENT
Start: 2023-01-19 | End: 2023-01-19

## 2023-01-19 NOTE — LETTER
January 19, 2023     Guera Yun, [address]  [address] [facility]    Patient: Elicia Holland   YOB: 1950   Date of Visit: 1/19/2023       Dear Dr Princess Olivarez:    Thank you for referring Becca Ramos to me for evaluation  Below are my notes for this consultation  If you have questions, please do not hesitate to call me  I look forward to following your patient along with you  Sincerely,        Josselyn Ying MD        CC: No Recipients  Josselyn Ying MD  1/19/2023  3:01 PM  Sign when Signing Visit  Assessment/Plan:  1  Bladder cancer-history of low-grade low stage papillary bladder cancer treated with resection and 2 years of BCG with last positive biopsy 2017-cystoscopy negative for recurrence today-repeat 1 year with urinary cytology     2  History of right renal cell carcinoma status post right partial nephrectomy in 2013  Patient has failed to obtain renal ultrasound and chest x-ray again    3  BPH without lower urinary tract symptoms-PSA and EJ in 1 year  Patient has not had a PSA since 09/18/2019 despite it being ordered  Patient was again advised to get a PSA blood test   Previous PSA in 2019 normal and EJ now is negative for any obvious malignancy or palpable nodules     4  Superficial bulbar urethral stricture-dilated at the time of cystoscopy today, very dense but not very symptomatic in terms of obstructive symptoms  5   Chronic asymptomatic bacteriuria  No problem-specific Assessment & Plan notes found for this encounter          Subjective:       Patient ID: Elicia Holland is a 70 y o  male      HPI  75-year-old male well known to me with history of low-grade low stage papillary bladder tumor treated with 2 years of BCG therapy who presents for surveillance cystoscopy  He denies significant obstructive voiding symptoms and is known to have a bulbar urethral stricture  He has an AUA symptom score of only 3    He also has a history of right partial nephrectomy for low-grade low stage renal cell carcinoma but has failed to get renal ultrasound and chest x-ray in multiple years as well as not getting PSA in multiple years  The patient presents for follow-up  The following portions of the patient's history were reviewed and updated as appropriate: allergies, current medications, past family history, past medical history, past social history, past surgical history and problem list      Review of Systems   Genitourinary: Positive for frequency  AUA symptom score 3 with nocturia twice, 1/5 frequency   Musculoskeletal: Positive for arthralgias and myalgias  All other systems reviewed and are negative          Objective:    Physical Exam  Vitals reviewed  Constitutional:       General: He is not in acute distress  Appearance: Normal appearance  He is obese  He is not ill-appearing, toxic-appearing or diaphoretic  HENT:      Head: Normocephalic and atraumatic  Nose: Nose normal       Mouth/Throat:      Mouth: Mucous membranes are moist    Eyes:      Extraocular Movements: Extraocular movements intact  Pulmonary:      Effort: Pulmonary effort is normal  No respiratory distress  Abdominal:      General: Bowel sounds are normal  There is no distension  Palpations: Abdomen is soft  Genitourinary:     Penis: Normal     Neurological:      Mental Status: He is alert and oriented to person, place, and time  Psychiatric:         Mood and Affect: Mood normal          Behavior: Behavior normal          Thought Content: Thought content normal          Judgment: Judgment normal               Cystoscopy     Date/Time 1/19/2023 2:07 PM     Performed by  Niurka Mckeon MD     Authorized by Niurka Mckeon MD      Universal Protocol:  Consent: Verbal consent obtained  Written consent obtained    Risks and benefits: risks, benefits and alternatives were discussed  Consent given by: patient  Patient understanding: patient states understanding of the procedure being performed  Patient consent: the patient's understanding of the procedure matches consent given  Procedure consent: procedure consent matches procedure scheduled  Required items: required blood products, implants, devices, and special equipment available  Patient identity confirmed: verbally with patient        Procedure Details:  Procedure type: dilation of urethral stricture    Patient tolerance: Patient tolerated the procedure well with no immediate complications    Additional Procedure Details: With the patient in the supine position after prepping the urethral meatus with Betadine and instilling lidocaine 2% lubricant and leaving indwelling in the urethral lumen flexible video cystourethroscopy took place  I was able to insert the cystoscope into the distal penile urethra however at the level of the penoscrotal junction I was unable to pass the scope any further retrograde due to urethral stricture  Filiforms and followers were used to dilate the urethra from approximately 8 [place] all the way up to 20 Western [place]ona size inclusively  Cystourethroscopy then took place revealing a well dilated urethral stricture but a very noncompliant fibrous urethra throughout  The prostatic fossa was well resected without bladder neck contracture and although the bladder had quite a bit of debris there is no evidence of intrinsic lesion extrinsic mass compression or abnormality as to the ureteral orifice ease which were normal position and configuration bilaterally with clear reflux bilaterally  There was a widemouth left lateral wall diverticulum free of any lesions and was fully examined  The cystoscope was removed under direct vision after retroflexion revealed no bladder neck abnormalities  The patient was able to void with light pink urine I recovered uneventfully  Of note is that the patient has asymptomatic bacteriuria on a chronic basis    Minimized and 80 mg IM was administered prophylactically

## 2023-01-19 NOTE — PROGRESS NOTES
Assessment/Plan:  1  Bladder cancer-history of low-grade low stage papillary bladder cancer treated with resection and 2 years of BCG with last positive biopsy 2017-cystoscopy negative for recurrence today-repeat 1 year with urinary cytology     2  History of right renal cell carcinoma status post right partial nephrectomy in 2013  Patient has failed to obtain renal ultrasound and chest x-ray again    3  BPH without lower urinary tract symptoms-PSA and EJ in 1 year  Patient has not had a PSA since 09/18/2019 despite it being ordered  Patient was again advised to get a PSA blood test   Previous PSA in 2019 normal and EJ now is negative for any obvious malignancy or palpable nodules     4  Superficial bulbar urethral stricture-dilated at the time of cystoscopy today, very dense but not very symptomatic in terms of obstructive symptoms  5   Chronic asymptomatic bacteriuria  No problem-specific Assessment & Plan notes found for this encounter          Subjective:       Patient ID: Gail Wilson is a 70 y o  male      HPI  77-year-old male well known to me with history of low-grade low stage papillary bladder tumor treated with 2 years of BCG therapy who presents for surveillance cystoscopy  He denies significant obstructive voiding symptoms and is known to have a bulbar urethral stricture  He has an AUA symptom score of only 3  He also has a history of right partial nephrectomy for low-grade low stage renal cell carcinoma but has failed to get renal ultrasound and chest x-ray in multiple years as well as not getting PSA in multiple years  The patient presents for follow-up  The following portions of the patient's history were reviewed and updated as appropriate: allergies, current medications, past family history, past medical history, past social history, past surgical history and problem list      Review of Systems   Genitourinary: Positive for frequency          AUA symptom score 3 with nocturia twice, 1/5 frequency   Musculoskeletal: Positive for arthralgias and myalgias  All other systems reviewed and are negative          Objective:    Physical Exam  Vitals reviewed  Constitutional:       General: He is not in acute distress  Appearance: Normal appearance  He is obese  He is not ill-appearing, toxic-appearing or diaphoretic  HENT:      Head: Normocephalic and atraumatic  Nose: Nose normal       Mouth/Throat:      Mouth: Mucous membranes are moist    Eyes:      Extraocular Movements: Extraocular movements intact  Pulmonary:      Effort: Pulmonary effort is normal  No respiratory distress  Abdominal:      General: Bowel sounds are normal  There is no distension  Palpations: Abdomen is soft  Genitourinary:     Penis: Normal     Neurological:      Mental Status: He is alert and oriented to person, place, and time  Psychiatric:         Mood and Affect: Mood normal          Behavior: Behavior normal          Thought Content: Thought content normal          Judgment: Judgment normal               Cystoscopy     Date/Time 1/19/2023 2:07 PM     Performed by  Tim Bishop MD     Authorized by Tim Bishop MD      Universal Protocol:  Consent: Verbal consent obtained  Written consent obtained    Risks and benefits: risks, benefits and alternatives were discussed  Consent given by: patient  Patient understanding: patient states understanding of the procedure being performed  Patient consent: the patient's understanding of the procedure matches consent given  Procedure consent: procedure consent matches procedure scheduled  Required items: required blood products, implants, devices, and special equipment available  Patient identity confirmed: verbally with patient        Procedure Details:  Procedure type: dilation of urethral stricture    Patient tolerance: Patient tolerated the procedure well with no immediate complications    Additional Procedure Details: With the patient in the supine position after prepping the urethral meatus with Betadine and instilling lidocaine 2% lubricant and leaving indwelling in the urethral lumen flexible video cystourethroscopy took place  I was able to insert the cystoscope into the distal penile urethra however at the level of the penoscrotal junction I was unable to pass the scope any further retrograde due to urethral stricture  Filiforms and followers were used to dilate the urethra from approximately 8 [place] all the way up to 20 [place] size inclusively  Cystourethroscopy then took place revealing a well dilated urethral stricture but a very noncompliant fibrous urethra throughout  The prostatic fossa was well resected without bladder neck contracture and although the bladder had quite a bit of debris there is no evidence of intrinsic lesion extrinsic mass compression or abnormality as to the ureteral orifice ease which were normal position and configuration bilaterally with clear reflux bilaterally  There was a widemouth left lateral wall diverticulum free of any lesions and was fully examined  The cystoscope was removed under direct vision after retroflexion revealed no bladder neck abnormalities  The patient was able to void with light pink urine I recovered uneventfully  Of note is that the patient has asymptomatic bacteriuria on a chronic basis and was to be administered 160 mg of IM gentamicin  Despite being told to wait in the examining room the patient left the office prior to getting any antibiotic    He was unable to be reached via telephone

## 2023-02-14 NOTE — PROGRESS NOTES
BCG instilled via yuen at this time  Pt aware of BR precautions at home, discharged to home at this time s/p removal of catheter  Confirmed w/[facility]

## 2023-05-13 ENCOUNTER — APPOINTMENT (OUTPATIENT)
Dept: GASTROENTEROLOGY | Facility: HOSPITAL | Age: 73
End: 2023-05-13
Attending: INTERNAL MEDICINE

## 2023-05-13 ENCOUNTER — HOSPITAL ENCOUNTER (OUTPATIENT)
Facility: HOSPITAL | Age: 73
Setting detail: OBSERVATION
Discharge: HOME | End: 2023-05-14
Attending: EMERGENCY MEDICINE | Admitting: INTERNAL MEDICINE

## 2023-05-13 ENCOUNTER — APPOINTMENT (EMERGENCY)
Dept: CT IMAGING | Facility: HOSPITAL | Age: 73
End: 2023-05-13

## 2023-05-13 ENCOUNTER — ANESTHESIA EVENT (OUTPATIENT)
Dept: GASTROENTEROLOGY | Facility: HOSPITAL | Age: 73
End: 2023-05-13

## 2023-05-13 ENCOUNTER — ANESTHESIA (OUTPATIENT)
Dept: GASTROENTEROLOGY | Facility: HOSPITAL | Age: 73
End: 2023-05-13

## 2023-05-13 DIAGNOSIS — N17.9 AKI (ACUTE KIDNEY INJURY) (HCC): ICD-10-CM

## 2023-05-13 DIAGNOSIS — K25.9 STOMACH ULCER: ICD-10-CM

## 2023-05-13 DIAGNOSIS — K92.1 MELENA: Primary | ICD-10-CM

## 2023-05-13 DIAGNOSIS — D64.9 ANEMIA, UNSPECIFIED TYPE: ICD-10-CM

## 2023-05-13 PROBLEM — E03.9 HYPOTHYROIDISM: Status: ACTIVE | Noted: 2023-05-13

## 2023-05-13 PROBLEM — G40.909 SEIZURE DISORDER (HCC): Status: ACTIVE | Noted: 2023-05-13

## 2023-05-13 PROBLEM — E78.5 HYPERLIPIDEMIA: Status: ACTIVE | Noted: 2023-05-13

## 2023-05-13 PROBLEM — I10 ESSENTIAL HYPERTENSION: Status: ACTIVE | Noted: 2023-05-13

## 2023-05-13 PROBLEM — K92.2 GI BLEED: Status: ACTIVE | Noted: 2023-05-13

## 2023-05-13 LAB
ABO GROUP BLD: NORMAL
ABO GROUP BLD: NORMAL
ALBUMIN SERPL BCP-MCNC: 3.9 G/DL (ref 3.5–5)
ALP SERPL-CCNC: 68 U/L (ref 34–104)
ALT SERPL W P-5'-P-CCNC: 7 U/L (ref 7–52)
ANION GAP SERPL CALCULATED.3IONS-SCNC: 7 MMOL/L (ref 4–13)
APTT PPP: 25 SECONDS (ref 23–37)
AST SERPL W P-5'-P-CCNC: 9 U/L (ref 13–39)
ATRIAL RATE: 85 BPM
BASOPHILS # BLD AUTO: 0.08 THOUSANDS/ÂΜL (ref 0–0.1)
BASOPHILS NFR BLD AUTO: 1 % (ref 0–1)
BILIRUB SERPL-MCNC: 0.35 MG/DL (ref 0.2–1)
BLD GP AB SCN SERPL QL: NEGATIVE
BUN SERPL-MCNC: 64 MG/DL (ref 5–25)
CALCIUM SERPL-MCNC: 8.8 MG/DL (ref 8.4–10.2)
CHLORIDE SERPL-SCNC: 108 MMOL/L (ref 96–108)
CO2 SERPL-SCNC: 25 MMOL/L (ref 21–32)
CREAT SERPL-MCNC: 1.53 MG/DL (ref 0.6–1.3)
EOSINOPHIL # BLD AUTO: 0.09 THOUSAND/ÂΜL (ref 0–0.61)
EOSINOPHIL NFR BLD AUTO: 1 % (ref 0–6)
ERYTHROCYTE [DISTWIDTH] IN BLOOD BY AUTOMATED COUNT: 13 % (ref 11.6–15.1)
GFR SERPL CREATININE-BSD FRML MDRD: 44 ML/MIN/1.73SQ M
GLUCOSE SERPL-MCNC: 149 MG/DL (ref 65–140)
HCT VFR BLD AUTO: 31.7 % (ref 36.5–49.3)
HGB BLD-MCNC: 9.8 G/DL (ref 12–17)
HGB BLD-MCNC: 9.9 G/DL (ref 12–17)
IMM GRANULOCYTES # BLD AUTO: 0.06 THOUSAND/UL (ref 0–0.2)
IMM GRANULOCYTES NFR BLD AUTO: 1 % (ref 0–2)
INR PPP: 1.07 (ref 0.84–1.19)
LYMPHOCYTES # BLD AUTO: 1.88 THOUSANDS/ÂΜL (ref 0.6–4.47)
LYMPHOCYTES NFR BLD AUTO: 15 % (ref 14–44)
MCH RBC QN AUTO: 31.2 PG (ref 26.8–34.3)
MCHC RBC AUTO-ENTMCNC: 31.2 G/DL (ref 31.4–37.4)
MCV RBC AUTO: 100 FL (ref 82–98)
MONOCYTES # BLD AUTO: 0.74 THOUSAND/ÂΜL (ref 0.17–1.22)
MONOCYTES NFR BLD AUTO: 6 % (ref 4–12)
NEUTROPHILS # BLD AUTO: 9.46 THOUSANDS/ÂΜL (ref 1.85–7.62)
NEUTS SEG NFR BLD AUTO: 76 % (ref 43–75)
NRBC BLD AUTO-RTO: 0 /100 WBCS
P AXIS: 58 DEGREES
PLATELET # BLD AUTO: 327 THOUSANDS/UL (ref 149–390)
PMV BLD AUTO: 10.5 FL (ref 8.9–12.7)
POTASSIUM SERPL-SCNC: 4.2 MMOL/L (ref 3.5–5.3)
PR INTERVAL: 196 MS
PROT SERPL-MCNC: 6.6 G/DL (ref 6.4–8.4)
PROTHROMBIN TIME: 13.9 SECONDS (ref 11.6–14.5)
QRS AXIS: 47 DEGREES
QRSD INTERVAL: 98 MS
QT INTERVAL: 354 MS
QTC INTERVAL: 421 MS
RBC # BLD AUTO: 3.17 MILLION/UL (ref 3.88–5.62)
RH BLD: NEGATIVE
RH BLD: NEGATIVE
SODIUM SERPL-SCNC: 140 MMOL/L (ref 135–147)
SPECIMEN EXPIRATION DATE: NORMAL
T WAVE AXIS: 56 DEGREES
VENTRICULAR RATE: 85 BPM
WBC # BLD AUTO: 12.31 THOUSAND/UL (ref 4.31–10.16)

## 2023-05-13 RX ORDER — SODIUM CHLORIDE 9 MG/ML
100 INJECTION, SOLUTION INTRAVENOUS CONTINUOUS
Status: DISCONTINUED | OUTPATIENT
Start: 2023-05-13 | End: 2023-05-14 | Stop reason: HOSPADM

## 2023-05-13 RX ORDER — CARBAMAZEPINE 100 MG/1
400 TABLET, EXTENDED RELEASE ORAL 3 TIMES DAILY
Status: DISCONTINUED | OUTPATIENT
Start: 2023-05-13 | End: 2023-05-14 | Stop reason: HOSPADM

## 2023-05-13 RX ORDER — LEVOTHYROXINE SODIUM 0.05 MG/1
50 TABLET ORAL
Status: DISCONTINUED | OUTPATIENT
Start: 2023-05-13 | End: 2023-05-14 | Stop reason: HOSPADM

## 2023-05-13 RX ORDER — LISINOPRIL 5 MG/1
5 TABLET ORAL DAILY
Status: DISCONTINUED | OUTPATIENT
Start: 2023-05-13 | End: 2023-05-13

## 2023-05-13 RX ORDER — SODIUM CHLORIDE, SODIUM LACTATE, POTASSIUM CHLORIDE, CALCIUM CHLORIDE 600; 310; 30; 20 MG/100ML; MG/100ML; MG/100ML; MG/100ML
20 INJECTION, SOLUTION INTRAVENOUS CONTINUOUS
Status: DISCONTINUED | OUTPATIENT
Start: 2023-05-13 | End: 2023-05-13

## 2023-05-13 RX ORDER — SODIUM CHLORIDE, SODIUM LACTATE, POTASSIUM CHLORIDE, CALCIUM CHLORIDE 600; 310; 30; 20 MG/100ML; MG/100ML; MG/100ML; MG/100ML
INJECTION, SOLUTION INTRAVENOUS CONTINUOUS PRN
Status: DISCONTINUED | OUTPATIENT
Start: 2023-05-13 | End: 2023-05-13

## 2023-05-13 RX ORDER — IODIXANOL 320 MG/ML
100 INJECTION, SOLUTION INTRAVASCULAR
Status: COMPLETED | OUTPATIENT
Start: 2023-05-13 | End: 2023-05-13

## 2023-05-13 RX ORDER — PROPOFOL 10 MG/ML
INJECTION, EMULSION INTRAVENOUS AS NEEDED
Status: DISCONTINUED | OUTPATIENT
Start: 2023-05-13 | End: 2023-05-13

## 2023-05-13 RX ORDER — ATORVASTATIN CALCIUM 40 MG/1
40 TABLET, FILM COATED ORAL
Status: DISCONTINUED | OUTPATIENT
Start: 2023-05-13 | End: 2023-05-14 | Stop reason: HOSPADM

## 2023-05-13 RX ORDER — LEVETIRACETAM 500 MG/1
500 TABLET ORAL 2 TIMES DAILY
Status: DISCONTINUED | OUTPATIENT
Start: 2023-05-13 | End: 2023-05-14 | Stop reason: HOSPADM

## 2023-05-13 RX ORDER — ONDANSETRON 2 MG/ML
4 INJECTION INTRAMUSCULAR; INTRAVENOUS EVERY 6 HOURS PRN
Status: DISCONTINUED | OUTPATIENT
Start: 2023-05-13 | End: 2023-05-14 | Stop reason: HOSPADM

## 2023-05-13 RX ORDER — PANTOPRAZOLE SODIUM 40 MG/10ML
40 INJECTION, POWDER, LYOPHILIZED, FOR SOLUTION INTRAVENOUS EVERY 12 HOURS
Status: DISCONTINUED | OUTPATIENT
Start: 2023-05-13 | End: 2023-05-14 | Stop reason: HOSPADM

## 2023-05-13 RX ADMIN — CARBAMAZEPINE 400 MG: 200 TABLET, EXTENDED RELEASE ORAL at 11:48

## 2023-05-13 RX ADMIN — LEVETIRACETAM 500 MG: 500 TABLET, FILM COATED ORAL at 17:35

## 2023-05-13 RX ADMIN — SODIUM CHLORIDE 1000 ML: 0.9 INJECTION, SOLUTION INTRAVENOUS at 09:09

## 2023-05-13 RX ADMIN — PROPOFOL 70 MG: 10 INJECTION, EMULSION INTRAVENOUS at 16:47

## 2023-05-13 RX ADMIN — SODIUM CHLORIDE 100 ML/HR: 0.9 INJECTION, SOLUTION INTRAVENOUS at 18:16

## 2023-05-13 RX ADMIN — ATORVASTATIN CALCIUM 40 MG: 40 TABLET, FILM COATED ORAL at 17:35

## 2023-05-13 RX ADMIN — CARBAMAZEPINE 400 MG: 200 TABLET, EXTENDED RELEASE ORAL at 21:30

## 2023-05-13 RX ADMIN — PANTOPRAZOLE SODIUM 40 MG: 40 INJECTION, POWDER, FOR SOLUTION INTRAVENOUS at 11:49

## 2023-05-13 RX ADMIN — PANTOPRAZOLE SODIUM 40 MG: 40 INJECTION, POWDER, FOR SOLUTION INTRAVENOUS at 23:07

## 2023-05-13 RX ADMIN — SODIUM CHLORIDE, SODIUM LACTATE, POTASSIUM CHLORIDE, AND CALCIUM CHLORIDE: .6; .31; .03; .02 INJECTION, SOLUTION INTRAVENOUS at 16:42

## 2023-05-13 RX ADMIN — SODIUM CHLORIDE 100 ML/HR: 0.9 INJECTION, SOLUTION INTRAVENOUS at 11:53

## 2023-05-13 RX ADMIN — IODIXANOL 100 ML: 320 INJECTION, SOLUTION INTRAVASCULAR at 10:09

## 2023-05-13 NOTE — H&P
[address]  H&P  Name: Emmett Najera 67 y o  male I MRN: [id number]  Unit/Bed#: -01 I Date of Admission: 5/13/2023   Date of Service: 5/13/2023 I Hospital Day: 0      Assessment/Plan   * Possible upper GI bleed  Assessment & Plan  · Presented for evaluation of dark black stools  · Last hemoglobin on record was around 15 however this was several years ago  · H/H: 9 9/31 7  · Hold home aspirin  · Check anemia panel  · Continue to trend H&H every 8 hours  · Begin Protonix 40 mg IV twice daily  · Transfuse for hemoglobin less than 7  · GI consultation requested    Anemia  Assessment & Plan  · Possibly acute blood loss anemia due to GI bleed  · As stated above hemoglobin was previously noted to be 15 however this was several years ago  · This is in the setting of a patient with known history of bladder cancer and renal cell carcinoma  · Further management as above    LULA (acute kidney injury) ([facility] )  Assessment & Plan  · Possible LULA versus CKD  · Last creatinine was 0 94, however, this was from 2019  · This is in the setting of a patient of known renal cell carcinoma  · Begin IV fluid resuscitation  · Follow-up a m  labs    History of renal cell cancer  Assessment & Plan  · Status post partial right nephrectomy in 2013  · Recommend continued outpatient follow-up    History of bladder cancer  Assessment & Plan  · 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 and 2 years of BCG  · Recommend continued outpatient follow-up    Seizure disorder ([facility] )  Assessment & Plan  · Continue home carbamazepine 400 mg 3 times daily and Keppra 500 mg twice daily  · Recommend continued outpatient follow-up    Hypothyroidism  Assessment & Plan  · Continue home levothyroxine 50 mcg daily    Essential hypertension  Assessment & Plan  · We will hold home lisinopril in the setting of LULA and possible GI bleed  · Monitor blood pressure trends while admitted and restart medications as indicated    Hyperlipidemia  Assessment & Plan  · Substitute atorvastatin for home Crestor during admission         VTE Pharmacologic Prophylaxis: VTE Score: 6 High Risk (Score >/= 5) - Pharmacological DVT Prophylaxis Contraindicated  Sequential Compression Devices Ordered  Code Status: Level 1 - Full Code   Discussion with family: Updated  (wife) at bedside  Anticipated Length of Stay: Patient will be admitted on an observation basis with an anticipated length of stay of less than 2 midnights secondary to possible upper GI bleed  Total Time Spent on Date of Encounter in care of patient: 75 minutes This time was spent on one or more of the following: performing physical exam; counseling and coordination of care; obtaining or reviewing history; documenting in the medical record; reviewing/ordering tests, medications or procedures; communicating with other healthcare professionals and discussing with patient's family/caregivers  Chief Complaint: Abdominal pain and dark stools    History of Present Illness:  Little Perez is a 67 y o  male with a PMH of renal cell carcinoma, bladder cancer, seizure disorder, hypothyroidism, hypertension, and hyperlipidemia who presents with the complaint of abdominal pain and dark stools  Patient follows consistently with the [facility] [address] [facility] and notes that he has annual blood work done  He was in his usual state of health until approximately 3 days ago when he developed bilateral lower quadrant abdominal pain and associated dark stools  The patient does take a baby aspirin daily and takes Aleve approximately 3 times a day for back and joint pain  He denies any associated nausea or vomiting but does note diarrhea  He denies bright red blood per rectum and hematemesis  He denies any home iron supplementation and has not taken anything over-the-counter for his symptoms to include Pepto-Bismol      Upon arrival to the emergency department the patient's hemoglobin was noted to be 9 9 and fecal occult was positive  Last labs on file here were approximately 4 years ago but demonstrated a hemoglobin of around 15  The patient states that he does not have a known history of anemia but cannot recall his hemoglobin on most recent labs  Given his symptoms and anemia, the patient was started on IV Protonix and admitted to the medical floor for further observation and GI consultation for possible endoscopy  Review of Systems:  Review of Systems   Constitutional: Negative for chills and fever  HENT: Negative for ear pain, sinus pain and sore throat  Eyes: Negative for pain and visual disturbance  Respiratory: Negative for cough, shortness of breath and wheezing  Cardiovascular: Negative for chest pain and palpitations  Gastrointestinal: Positive for abdominal pain, blood in stool and diarrhea  Negative for constipation, nausea and vomiting  Genitourinary: Negative for dysuria and hematuria  Musculoskeletal: Negative for arthralgias, back pain and myalgias  Skin: Negative for color change and rash  Neurological: Negative for dizziness, seizures, syncope and weakness  Psychiatric/Behavioral: Negative for agitation, confusion and hallucinations  All other systems reviewed and are negative        Past Medical and Surgical History:   Past Medical History:   Diagnosis Date   • Arthritis    • BPH (benign prostatic hyperplasia)    • Full dentures    • Gross hematuria    • Hearing loss    • [place] (hard of hearing)     right ear   • Hyperlipidemia    • Hypothyroid    • Lesion of bladder    • Low back pain    • Malignant neoplasm of overlapping sites of bladder [facility])    • Malignant neoplasm of right kidney, except renal pelvis (HCC)    • Prediabetes    • Renal cyst    • Right renal mass    • Sciatica    • Seizures ([address] )     last seizure 14 yrs ago   • Wears glasses     reading       Past Surgical History:   Procedure Laterality Date   • COLONOSCOPY     • CYSTOSCOPY  2012, 2013, 2014   • CYSTOSCOPY N/A 12/22/2017    Procedure: INTRAVESICAL MITOMYCIN;  Surgeon: Fernandez Eaton MD;  Location: [facility];  Service: Urology   • CYSTOSCOPY W/ RETROGRADES Bilateral 2012   • PARTIAL NEPHRECTOMY Right 2013   • UT CYSTO BLADDER W/URETERAL CATHETERIZATION Bilateral 11/30/2018    Procedure: Eva Centeno;  Surgeon: Fernandez Eaton MD;  [facility];  Service: Urology   • UT CYSTOURETHROSCOPY W/DEST &/RMVL MED BLADDER RUDY N/A 12/22/2017    Procedure: TRANSURETHRAL RESECTION OF BLADDER TUMOR (TURBT); Surgeon: Fernandez Eaton MD;  [facility];  Service: Urology   • UT CYSTOURETHROSCOPY W/DEST &/RMVL MED BLADDER RUDY N/A 11/30/2018    Procedure: TURBT;  Surgeon: Fernandez Eaton MD;  [facility];  Service: Urology   • RENAL CYST EXCISION      questionable malignancy   • TRANSURETHRAL RESECTION OF PROSTATE  2012       Meds/Allergies:  Prior to Admission medications    Medication Sig Start Date End Date Taking?  Authorizing Provider   acetaminophen (TYLENOL) 325 mg tablet Take 650 mg by mouth every 6 (six) hours as needed for mild pain    Historical Provider, MD   aspirin (ECOTRIN LOW STRENGTH) 81 mg EC tablet Take 81 mg by mouth daily    Historical Provider, MD   carBAMazepine (TEGretol XR) 400 mg 12 hr tablet Take 400 mg by mouth 3 (three) times a day    Historical Provider, MD   Cyanocobalamin (VITAMIN B-12 PO) Take 1 tablet by mouth daily    Historical Provider, MD   Folate-B12-Intrinsic Factor (INTRINSI X25-TDUCKN) [phone] MCG-MCG-MG TABS Take by oral route every other day    Historical Provider, MD   levETIRAcetam (KEPPRA) 500 mg tablet Take 500 mg by mouth 2 (two) times a day    Historical Provider, MD   levothyroxine 75 mcg tablet Take 50 mcg by mouth daily     Historical Provider, MD   lisinopril (ZESTRIL) 5 mg tablet Take 5 mg by mouth daily    Historical Provider, MD   Naproxen Sodium (ALEVE PO) Take 1 tablet by mouth 2 (two) times a day "as needed      Historical Provider, MD   Omega-3 Fatty Acids (FISH OIL PO) Take by mouth    Historical Provider, MD   rosuvastatin (CRESTOR) 20 MG tablet Take 20 mg by mouth daily    Historical Provider, MD   levETIRAcetam (KEPPRA) 250 mg tablet Take two tablets (500mg) twice a day by mouth  Patient not taking: No sig reported 20  Historical Provider, MD     I have reviewed home medications with patient personally  Allergies: Allergies   Allergen Reactions   • Bactrim [Sulfamethoxazole-Trimethoprim] Itching, Hives and Rash     Rash, itching   • Atorvastatin Drowsiness       Social History:  Marital Status: /Civil Union   Patient Pre-hospital Living Situation: Home  Patient Pre-hospital Level of Mobility: walks  Patient Pre-hospital Diet Restrictions: None    Substance Use History:   Social History     Substance and Sexual Activity   Alcohol Use No    Comment: Former drinker  Social History     Tobacco Use   Smoking Status Former   • Packs/day: 2 00   • Years: 40 00   • Pack years: 80 00   • Types: Cigarettes   • Quit date: 2014   • Years since quittin 4   Smokeless Tobacco Never     Social History     Substance and Sexual Activity   Drug Use No       Family History:  Family History   Problem Relation Age of Onset   • Diabetes Family        Physical Exam:     Vitals:   Blood Pressure: 113/69 (23 1111)  Pulse: 67 (23 1111)  Temperature: 97 6 °F (36 4 °C) (23 1111)  Temp Source: Tympanic (23 0858)  Respirations: 18 (23 1111)  Height: 6' 1\" (185 4 cm) (23 1111)  Weight - Scale: 110 kg (241 lb 13 5 oz) (23 1111)  SpO2: 95 % (23 1130)    Physical Exam  Vitals and nursing note reviewed  Constitutional:       General: He is not in acute distress  Appearance: Normal appearance  He is obese  HENT:      Head: Normocephalic and atraumatic  Mouth/Throat:      Mouth: Mucous membranes are moist       Pharynx: Oropharynx is clear   " Eyes:      Extraocular Movements: Extraocular movements intact  Conjunctiva/sclera: Conjunctivae normal    Cardiovascular:      Rate and Rhythm: Normal rate and regular rhythm  Pulses: Normal pulses  Heart sounds: Normal heart sounds  Pulmonary:      Effort: Pulmonary effort is normal  No respiratory distress  Breath sounds: Normal breath sounds  No wheezing  Abdominal:      General: Bowel sounds are normal  There is no distension  Palpations: Abdomen is soft  Tenderness: There is abdominal tenderness  Musculoskeletal:         General: Normal range of motion  Cervical back: Normal range of motion and neck supple  Skin:     General: Skin is warm and dry  Neurological:      General: No focal deficit present  Mental Status: He is alert and oriented to person, place, and time  Mental status is at baseline  Psychiatric:         Mood and Affect: Mood normal          Behavior: Behavior normal          Judgment: Judgment normal           Additional Data:     Lab Results:  Results from last 7 days   Lab Units 05/13/23  0907   WBC Thousand/uL 12 31*   HEMOGLOBIN g/dL 9 9*   HEMATOCRIT % 31 7*   PLATELETS Thousands/uL 327   NEUTROS PCT % 76*   LYMPHS PCT % 15   MONOS PCT % 6   EOS PCT % 1     Results from last 7 days   Lab Units 05/13/23  0907   SODIUM mmol/L 140   POTASSIUM mmol/L 4 2   CHLORIDE mmol/L 108   CO2 mmol/L 25   BUN mg/dL 64*   CREATININE mg/dL 1 53*   ANION GAP mmol/L 7   CALCIUM mg/dL 8 8   ALBUMIN g/dL 3 9   TOTAL BILIRUBIN mg/dL 0 35   ALK PHOS U/L 68   ALT U/L 7   AST U/L 9*   GLUCOSE RANDOM mg/dL 149*     Results from last 7 days   Lab Units 05/13/23  0907   INR  1 07                   Lines/Drains:  Invasive Devices     Peripheral Intravenous Line  Duration           Peripheral IV 05/13/23 Right;Upper;Ventral (anterior) Arm <1 day          Drain  Duration           Urethral Catheter 20 Fr  1967 days    Urethral Catheter Latex; Double-lumen 22 Fr  1624 days              Urinary Catheter:  Goal for removal: N/A - Chronic Miguel             Imaging: Reviewed radiology reports from this admission including: abdominal/pelvic CT  CT abdomen pelvis with contrast   Final Result by Yasmeen Cho MD (05/13 1023)      No acute intra-abdominal abnormality  Cholelithiasis  Stable postoperative changes involving the right kidney  Bladder diverticulum  Workstation performed: [id number]             EKG and Other Studies Reviewed on Admission:   · EKG: NSR  HR 85     ** Please Note: This note has been constructed using a voice recognition system   **

## 2023-05-13 NOTE — CONSULTS
Consultation - [address] [facility]  Temo Bravo 67 y o  male MRN: [id number]  Unit/Bed#: -01 Encounter: [id number]        Consults    Reason for Consult / Principal Problem:     Melena and anemia      ASSESSMENT AND PLAN:      Melena and anemia: He presents with a probable upper GI bleed  Given his aspirin and NSAID use he could have peptic ulcer disease  We will treat with intravenous Protonix and keep him n p o  for an upper endoscopy today  If this is negative we could consider prepping for colonoscopy  We will continue to follow his hemoglobin closely and transfuse as needed  Colon cancer screening: He should have an elective outpatient colonoscopy for colon cancer screening given his age     ______________________________________________________________________    HPI: He has multiple medical problems including a seizure disorder, bladder cancer, and renal cell carcinoma and presented with generalized abdominal pain and black-colored stools over the past 3 days  He denies any red blood in his stools and any hematemesis or coffee-ground emesis  He has not had any reflux, difficulty swallowing, weight loss, and denies any prior history of upper endoscopy or colonoscopy and any family history of stomach cancer or colon cancer  He chronically takes aspirin and takes naproxen as needed  His hemoglobin decreased from his baseline of 15 1 in 2018 to 9 9 today  He also has an elevated BUN at 64 although his creatinine increased to 1 53  His stool was Hemoccult positive  REVIEW OF SYSTEMS:    CONSTITUTIONAL: Denies any fever, chills, rigors, and weight loss  HEENT: No earache or tinnitus  Denies hearing loss or visual disturbances  CARDIOVASCULAR: No chest pain or palpitations  RESPIRATORY: Denies any cough, hemoptysis, shortness of breath or dyspnea on exertion  GASTROINTESTINAL: As noted in the History of Present Illness  GENITOURINARY: No problems with urination  Denies any hematuria or dysuria  NEUROLOGIC: No dizziness or vertigo, denies headaches  MUSCULOSKELETAL: Denies any muscle or joint pain  SKIN: Denies skin rashes or itching  ENDOCRINE: Denies excessive thirst  Denies intolerance to heat or cold  PSYCHOSOCIAL: Denies depression or anxiety  Denies any recent memory loss  Historical Information   Past Medical History:   Diagnosis Date   • Arthritis    • BPH (benign prostatic hyperplasia)    • Full dentures    • Gross hematuria    • Hearing loss    • [place] (hard of hearing)     right ear   • Hyperlipidemia    • Hypothyroid    • Lesion of bladder    • Low back pain    • Malignant neoplasm of overlapping sites of bladder [facility])    • Malignant neoplasm of right kidney, except renal pelvis (HCC)    • Prediabetes    • Renal cyst    • Right renal mass    • Sciatica    • Seizures ([address] )     last seizure 14 yrs ago   • Wears glasses     reading     Past Surgical History:   Procedure Laterality Date   • COLONOSCOPY     • CYSTOSCOPY  2012, 2013, 2014   • CYSTOSCOPY N/A 12/22/2017    Procedure: INTRAVESICAL MITOMYCIN;  Surgeon: Maryan Mortimer, MD;  Location: [facility];  Service: Urology   • CYSTOSCOPY W/ RETROGRADES Bilateral 2012   • PARTIAL NEPHRECTOMY Right 2013   • AL CYSTO BLADDER W/URETERAL CATHETERIZATION Bilateral 11/30/2018    Procedure: Giles Mahoney;  Surgeon: Maryan Mortimer, MD;  Location[facility];  Service: Urology   • AL CYSTOURETHROSCOPY W/DEST &/RMVL MED BLADDER RUDY N/A 12/22/2017    Procedure: TRANSURETHRAL RESECTION OF BLADDER TUMOR (TURBT);   Surgeon: Maryan Mortimer, MD;  [facility];  Service: Urology   • AL CYSTOURETHROSCOPY W/DEST &/RMVL MED BLADDER RUDY N/A 11/30/2018    Procedure: TURBT;  Surgeon: Maryan Mortimer, MD;  Location[facility];  Service: Urology   • RENAL CYST EXCISION      questionable malignancy   • TRANSURETHRAL RESECTION OF PROSTATE  2012     Social History   Social History     Substance and "Sexual Activity   Alcohol Use No    Comment: Former drinker  Social History     Substance and Sexual Activity   Drug Use No     Social History     Tobacco Use   Smoking Status Former   • Packs/day: 2 00   • Years: 40 00   • Pack years: 80 00   • Types: Cigarettes   • Quit date: 2014   • Years since quittin 4   Smokeless Tobacco Never     Family History   Problem Relation Age of Onset   • Diabetes Family        Meds/Allergies     Medications Prior to Admission   Medication   • aspirin (ECOTRIN LOW STRENGTH) 81 mg EC tablet   • acetaminophen (TYLENOL) 325 mg tablet   • carBAMazepine (TEGretol XR) 400 mg 12 hr tablet   • Cyanocobalamin (VITAMIN B-12 PO)   • Folate-B12-Intrinsic Factor (INTRINSI B12-FOLATE) 800-500-20 MCG-MCG-MG TABS   • levETIRAcetam (KEPPRA) 500 mg tablet   • levothyroxine 75 mcg tablet   • lisinopril (ZESTRIL) 5 mg tablet   • Naproxen Sodium (ALEVE PO)   • Omega-3 Fatty Acids (FISH OIL PO)   • rosuvastatin (CRESTOR) 20 MG tablet     Current Facility-Administered Medications   Medication Dose Route Frequency   • atorvastatin (LIPITOR) tablet 40 mg  40 mg Oral Daily With Dinner   • carBAMazepine (TEGretol XR) 12 hr tablet 400 mg  400 mg Oral TID   • levETIRAcetam (KEPPRA) tablet 500 mg  500 mg Oral BID   • levothyroxine tablet 50 mcg  50 mcg Oral Early Morning   • ondansetron (ZOFRAN) injection 4 mg  4 mg Intravenous Q6H PRN   • pantoprazole (PROTONIX) injection 40 mg  40 mg Intravenous Q12H   • sodium chloride 0 9 % infusion  100 mL/hr Intravenous Continuous     Facility-Administered Medications Ordered in Other Encounters   Medication Dose Route Frequency   • lactated ringers infusion   Intravenous Continuous PRN       Allergies   Allergen Reactions   • Bactrim [Sulfamethoxazole-Trimethoprim] Itching, Hives and Rash     Rash, itching   • Atorvastatin Drowsiness           Objective     Blood pressure 113/75, pulse 67, temperature (!) 97 3 °F (36 3 °C), resp   rate 16, height 6' 1\" (1 854 " m), weight 110 kg (241 lb 13 5 oz), SpO2 95 %  Body mass index is 31 91 kg/m²  Intake/Output Summary (Last 24 hours) at 5/13/2023 1645  Last data filed at 5/13/2023 1230  Gross per 24 hour   Intake 0 ml   Output --   Net 0 ml         PHYSICAL EXAM:      General Appearance:   Alert, cooperative, no distress   HEENT:   Normocephalic, atraumatic, anicteric      Neck:  Supple, symmetrical, trachea midline   Lungs:   Clear to auscultation bilaterally; no rales, rhonchi or wheezing; respirations unlabored    Heart[de-identified]   Regular rate and rhythm; no murmur, rub, or gallop     Abdomen:   Soft, obese, epigastric tenderness, non-distended; normal bowel sounds; no masses, no organomegaly    Genitalia:   Deferred    Rectal:   Deferred    Extremities:  No cyanosis, clubbing or edema    Pulses:  2+ and symmetric all extremities    Skin:  No jaundice, rashes, or lesions    Lymph nodes:  No palpable cervical lymphadenopathy        Lab Results:   Admission on 05/13/2023   Component Date Value   • WBC 05/13/2023 12 31 (H)    • RBC 05/13/2023 3 17 (L)    • Hemoglobin 05/13/2023 9 9 (L)    • Hematocrit 05/13/2023 31 7 (L)    • MCV 05/13/2023 100 (H)    • MCH 05/13/2023 31 2    • MCHC 05/13/2023 31 2 (L)    • RDW 05/13/2023 13 0    • MPV 05/13/2023 10 5    • Platelets 72/69/5646 327    • nRBC 05/13/2023 0    • Neutrophils Relative 05/13/2023 76 (H)    • Immat GRANS % 05/13/2023 1    • Lymphocytes Relative 05/13/2023 15    • Monocytes Relative 05/13/2023 6    • Eosinophils Relative 05/13/2023 1    • Basophils Relative 05/13/2023 1    • Neutrophils Absolute 05/13/2023 9 46 (H)    • Immature Grans Absolute 05/13/2023 0 06    • Lymphocytes Absolute 05/13/2023 1 88    • Monocytes Absolute 05/13/2023 0 74    • Eosinophils Absolute 05/13/2023 0 09    • Basophils Absolute 05/13/2023 0 08    • Sodium 05/13/2023 140    • Potassium 05/13/2023 4 2    • Chloride 05/13/2023 108    • CO2 05/13/2023 25    • ANION GAP 05/13/2023 7    • BUN 05/13/2023 64 (H)    • Creatinine 05/13/2023 1 53 (H)    • Glucose 05/13/2023 149 (H)    • Calcium 05/13/2023 8 8    • AST 05/13/2023 9 (L)    • ALT 05/13/2023 7    • Alkaline Phosphatase 05/13/2023 68    • Total Protein 05/13/2023 6 6    • Albumin 05/13/2023 3 9    • Total Bilirubin 05/13/2023 0 35    • eGFR 05/13/2023 44    • Protime 05/13/2023 13 9    • INR 05/13/2023 1 07    • PTT 05/13/2023 25    • ABO Grouping 05/13/2023 O    • Rh Factor 05/13/2023 Negative    • Antibody Screen 05/13/2023 Negative    • Specimen Expiration Date 05/13/2023 [id number]    • Ventricular Rate 05/13/2023 85    • Atrial Rate 05/13/2023 85    • NH Interval 05/13/2023 196    • QRSD Interval 05/13/2023 98    • QT Interval 05/13/2023 354    • QTC Interval 05/13/2023 421    • P Axis 05/13/2023 58    • QRS Axis 05/13/2023 47    • T Wave Axis 05/13/2023 56    • ABO Grouping 05/13/2023 O    • Rh Factor 05/13/2023 Negative    • Hemoglobin 05/13/2023 9 8 (L)        Imaging Studies: I have personally reviewed pertinent imaging studies

## 2023-05-13 NOTE — ANESTHESIA POSTPROCEDURE EVALUATION
Post-Op Assessment Note    CV Status:  Stable  Pain Score: 0    Pain management: adequate     Mental Status:  Alert   Hydration Status:  Stable   PONV Controlled:  None   Airway Patency:  Patent      Post Op Vitals Reviewed: Yes      Staff: Anesthesiologist         There were no known notable events for this encounter      BP   101/67   Temp  99 1   Pulse  88   Resp   20   SpO2   98

## 2023-05-13 NOTE — ED PROVIDER NOTES
"History  Chief Complaint   Patient presents with   • Diarrhea     For the past 3 days; noticed after eating spare ribs; black in color   • Weakness - Generalized     Patient reports for the past 3 days     Patient is a 77-year-old male presents for evaluation of diarrhea, dark stools  Patient says that he has been having diarrhea since Monday  Wife says he has been going to the bathroom \"about every 10 minutes  \"  Patient says his stools have been \"dark\" for the past 2-3 days  He has been taking Tums for his symptoms  He admits to some lower abdominal discomfort  He also admits to some generalized weakness  He denies any fevers, chills, lightheadedness or dizziness  The patient is on aspirin but denies any other blood thinners  He denies any history of GI bleeding  He denies any recent antibiotic use, recent travel outside the country  Prior to Admission Medications   Prescriptions Last Dose Informant Patient Reported? Taking?    Cyanocobalamin (VITAMIN B-12 PO)   Yes No   Sig: Take 1 tablet by mouth daily   Folate-B12-Intrinsic Factor (INTRINSI B12-FOLATE) 800-500-20 MCG-MCG-MG TABS   Yes No   Sig: Take by oral route every other day   Naproxen Sodium (ALEVE PO)   Yes No   Sig: Take 1 tablet by mouth 2 (two) times a day as needed     Omega-3 Fatty Acids (FISH OIL PO)   Yes No   Sig: Take by mouth   acetaminophen (TYLENOL) 325 mg tablet   Yes No   Sig: Take 650 mg by mouth every 6 (six) hours as needed for mild pain   aspirin (ECOTRIN LOW STRENGTH) 81 mg EC tablet   Yes No   Sig: Take 81 mg by mouth daily   carBAMazepine (TEGretol XR) 400 mg 12 hr tablet   Yes No   Sig: Take 400 mg by mouth 3 (three) times a day   levETIRAcetam (KEPPRA) 500 mg tablet   Yes No   Sig: Take 500 mg by mouth 2 (two) times a day   levothyroxine 75 mcg tablet   Yes No   Sig: Take 50 mcg by mouth daily    lisinopril (ZESTRIL) 5 mg tablet   Yes No   Sig: Take 5 mg by mouth daily   rosuvastatin (CRESTOR) 20 MG tablet   Yes No " Sig: Take 20 mg by mouth daily      Facility-Administered Medications: None       Past Medical History:   Diagnosis Date   • Arthritis    • BPH (benign prostatic hyperplasia)    • Full dentures    • Gross hematuria    • Hearing loss    • [place] (hard of hearing)     right ear   • Hyperlipidemia    • Hypothyroid    • Lesion of bladder    • Low back pain    • Malignant neoplasm of overlapping sites of bladder [facility])    • Malignant neoplasm of right kidney, except renal pelvis (HCC)    • Prediabetes    • Renal cyst    • Right renal mass    • Sciatica    • Seizures ([address] )     last seizure 14 yrs ago   • Wears glasses     reading       Past Surgical History:   Procedure Laterality Date   • COLONOSCOPY     • CYSTOSCOPY  2012, 2013, 2014   • CYSTOSCOPY N/A 12/22/2017    Procedure: INTRAVESICAL MITOMYCIN;  Surgeon: Rosa Hawley MD;  [facility];  Service: Urology   • CYSTOSCOPY W/ RETROGRADES Bilateral 2012   • PARTIAL NEPHRECTOMY Right 2013   • GA CYSTO BLADDER W/URETERAL CATHETERIZATION Bilateral 11/30/2018    Procedure: Ala Rout;  Surgeon: Rosa Hawley MD;  [facility];  Service: Urology   • GA CYSTOURETHROSCOPY W/DEST &/RMVL MED BLADDER RUDY N/A 12/22/2017    Procedure: TRANSURETHRAL RESECTION OF BLADDER TUMOR (TURBT); Surgeon: Rosa Hawley MD;  [facility];  Service: Urology   • GA CYSTOURETHROSCOPY W/DEST &/RMVL MED BLADDER RUDY N/A 11/30/2018    Procedure: TURBT;  Surgeon: Rosa Hawley MD;  [facility];  Service: Urology   • RENAL CYST EXCISION      questionable malignancy   • TRANSURETHRAL RESECTION OF PROSTATE  2012       Family History   Problem Relation Age of Onset   • Diabetes Family      I have reviewed and agree with the history as documented      E-Cigarette/Vaping     E-Cigarette/Vaping Substances     Social History     Tobacco Use   • Smoking status: Former     Packs/day: 2 00     Years: 40 00     Pack years: 80 00     Types: Cigarettes     Quit date: 2014     Years since quittin 4   • Smokeless tobacco: Never   Substance Use Topics   • Alcohol use: No     Comment: Former drinker  • Drug use: No       Review of Systems   Constitutional: Positive for fatigue  Negative for chills, fever and unexpected weight change  HENT: Negative for congestion, sore throat and trouble swallowing  Eyes: Negative for pain, discharge and itching  Respiratory: Negative for cough, chest tightness, shortness of breath and wheezing  Cardiovascular: Negative for chest pain, palpitations and leg swelling  Gastrointestinal: Positive for abdominal pain (lower) and diarrhea (dark)  Negative for blood in stool, nausea and vomiting  Endocrine: Negative for polyuria  Genitourinary: Negative for difficulty urinating, dysuria, frequency and hematuria  Musculoskeletal: Negative for arthralgias and back pain  Neurological: Negative for dizziness, syncope, weakness, light-headedness and headaches  Physical Exam  Physical Exam  Vitals and nursing note reviewed  Constitutional:       General: He is not in acute distress  Appearance: He is well-developed  HENT:      Head: Normocephalic and atraumatic  Right Ear: External ear normal       Left Ear: External ear normal    Eyes:      Conjunctiva/sclera: Conjunctivae normal       Pupils: Pupils are equal, round, and reactive to light  Cardiovascular:      Rate and Rhythm: Normal rate and regular rhythm  Heart sounds: Normal heart sounds  No murmur heard  No friction rub  No gallop  Pulmonary:      Effort: Pulmonary effort is normal  No respiratory distress  Breath sounds: Normal breath sounds  No wheezing or rales  Abdominal:      General: Bowel sounds are normal  There is no distension  Palpations: Abdomen is soft  Tenderness: There is abdominal tenderness (lower, mild)  There is no guarding     Genitourinary:     Rectum: Normal  Guaiac result positive  Comments: Stools dark in color  Hemocult +  Musculoskeletal:         General: No tenderness or deformity  Normal range of motion  Cervical back: Normal range of motion  Lymphadenopathy:      Cervical: No cervical adenopathy  Skin:     General: Skin is warm and dry  Neurological:      General: No focal deficit present  Mental Status: He is alert and oriented to person, place, and time  Mental status is at baseline  Cranial Nerves: No cranial nerve deficit  Sensory: No sensory deficit  Motor: No weakness or abnormal muscle tone     Psychiatric:         Behavior: Behavior normal          Vital Signs  ED Triage Vitals   Temperature Pulse Respirations Blood Pressure SpO2   05/13/23 0858 05/13/23 0854 05/13/23 0854 05/13/23 0854 05/13/23 0854   97 9 °F (36 6 °C) 94 17 111/77 95 %      Temp Source Heart Rate Source Patient Position - Orthostatic VS BP Location FiO2 (%)   05/13/23 0858 05/13/23 0854 05/13/23 0854 05/13/23 0854 --   Tympanic Monitor Sitting Left arm       Pain Score       05/13/23 0854       No Pain           Vitals:    05/13/23 0854 05/13/23 0929 05/13/23 1111   BP: 111/77 108/61 113/69   Pulse: 94 76 67   Patient Position - Orthostatic VS: Sitting           Visual Acuity  Visual Acuity    Flowsheet Row Most Recent Value   L Pupil Size (mm) 2   R Pupil Size (mm) 2          ED Medications  Medications   carBAMazepine (TEGretol XR) 12 hr tablet 400 mg (has no administration in time range)   levETIRAcetam (KEPPRA) tablet 500 mg (has no administration in time range)   levothyroxine tablet 50 mcg (has no administration in time range)   atorvastatin (LIPITOR) tablet 40 mg (has no administration in time range)   ondansetron (ZOFRAN) injection 4 mg (has no administration in time range)   pantoprazole (PROTONIX) injection 40 mg (has no administration in time range)   sodium chloride 0 9 % infusion (has no administration in time range)   sodium chloride 0 9 % bolus 1,000 mL (1,000 mL Intravenous New Bag 5/13/23 0909)   iodixanol (VISIPAQUE) 320 MG/ML injection 100 mL (100 mL Intravenous Given 5/13/23 1009)       Diagnostic Studies  Results Reviewed     Procedure Component Value Units Date/Time    Iron Saturation % [[id number]] Collected: 05/13/23 1105    Lab Status: In process Specimen: Blood from Arm, Right Updated: 05/13/23 1116    Ferritin [[id number]] Collected: 05/13/23 1105    Lab Status:  In process Specimen: Blood from Arm, Right Updated: 05/13/23 1116    Comprehensive metabolic panel [[id number]]  (Abnormal) Collected: 05/13/23 0907    Lab Status: Final result Specimen: Blood from Arm, Right Updated: 05/13/23 0930     Sodium 140 mmol/L      Potassium 4 2 mmol/L      Chloride 108 mmol/L      CO2 25 mmol/L      ANION GAP 7 mmol/L      BUN 64 mg/dL      Creatinine 1 53 mg/dL      Glucose 149 mg/dL      Calcium 8 8 mg/dL      AST 9 U/L      ALT 7 U/L      Alkaline Phosphatase 68 U/L      Total Protein 6 6 g/dL      Albumin 3 9 g/dL      Total Bilirubin 0 35 mg/dL      eGFR 44 ml/min/1 73sq m     Narrative:      [place] guidelines for Chronic Kidney Disease (CKD):   •  Stage 1 with normal or high GFR (GFR > 90 mL/min/1 73 square meters)  •  Stage 2 Mild CKD (GFR = 60-89 mL/min/1 73 square meters)  •  Stage 3A Moderate CKD (GFR = 45-59 mL/min/1 73 square meters)  •  Stage 3B Moderate CKD (GFR = 30-44 mL/min/1 73 square meters)  •  Stage 4 Severe CKD (GFR = 15-29 mL/min/1 73 square meters)  •  Stage 5 End Stage CKD (GFR <15 mL/min/1 73 square meters)  Note: GFR calculation is accurate only with a steady state creatinine    CBC and differential [[id number]]  (Abnormal) Collected: 05/13/23 0907    Lab Status: Final result Specimen: Blood from Arm, Right Updated: 05/13/23 0927     WBC 12 31 Thousand/uL      RBC 3 17 Million/uL      Hemoglobin 9 9 g/dL      Hematocrit 31 7 %       fL      MCH 31 2 pg      MCHC 31 2 g/dL      RDW 13 0 %      MPV 10 5 fL Platelets 223 Thousands/uL      nRBC 0 /100 WBCs      Neutrophils Relative 76 %      Immat GRANS % 1 %      Lymphocytes Relative 15 %      Monocytes Relative 6 %      Eosinophils Relative 1 %      Basophils Relative 1 %      Neutrophils Absolute 9 46 Thousands/µL      Immature Grans Absolute 0 06 Thousand/uL      Lymphocytes Absolute 1 88 Thousands/µL      Monocytes Absolute 0 74 Thousand/µL      Eosinophils Absolute 0 09 Thousand/µL      Basophils Absolute 0 08 Thousands/µL     Protime-INR [[id number]]  (Normal) Collected: 05/13/23 0907    Lab Status: Final result Specimen: Blood from Arm, Right Updated: 05/13/23 0927     Protime 13 9 seconds      INR 1 07    APTT [[id number]]  (Normal) Collected: 05/13/23 6993    Lab Status: Final result Specimen: Blood from Arm, Right Updated: 05/13/23 0927     PTT 25 seconds                  CT abdomen pelvis with contrast   Final Result by Jesus Cerda MD (05/13 1023)      No acute intra-abdominal abnormality  Cholelithiasis  Stable postoperative changes involving the right kidney  Bladder diverticulum  Workstation performed: [id number]                    Procedures  Procedures         ED Course  ED Course as of 05/13/23 1139   Sat May 13, 2023   0930 Hemoglobin(!): 9 9  Most recent Hgb was 13 9 on outpatient labwork from 11/4/2019   0932 BUN(!): 64   0932 Creatinine(!): 1 53  Most recent Cr/BUN from outpatient labwork on 2/27/2023:  1 1/16   1007 I spoke with Dr Sid Taylor of GI  He is going to review the patient's labs and history  He is deciding whether he will perform EGD today  Said to keep patient n p o                                SBIRT 22yo+    Flowsheet Row Most Recent Value   Initial Alcohol Screen: US AUDIT-C     1  How often do you have a drink containing alcohol? 0 Filed at: 05/13/2023 0854   2  How many drinks containing alcohol do you have on a typical day you are drinking? 0 Filed at: 05/13/2023 0854   3a  Male UNDER 65:  How often do you have five or more drinks on one occasion? 0 Filed at: 05/13/2023 0854   3b  FEMALE Any Age, or MALE 65+: How often do you have 4 or more drinks on one occassion? 0 Filed at: 05/13/2023 0854   Audit-C Score 0 Filed at: 05/13/2023 3786   EDITH: How many times in the past year have you    Used an illegal drug or used a prescription medication for non-medical reasons? Never Filed at: 05/13/2023 7732                    Medical Decision Making  68 yo M presenting for dark loose stools since Monday  Dark for the past 2-3 days  Not on thinners  Admits to lower abdominal discomfort and generalized fatigue  Stools dark and hemocult +  Differential includes:  Colitis vs diverticulitis vs bleeding polyp  We will obtain CBC to check hemoglobin  Will obtain CMP to evaluate kidney function and electrolytes  Will obtain CT abdomen pelvis with contrast rule out intra-abdominal process  Will obtain type and screen given dark Hemoccult positive stools  Hemoglobin 9 9  Most recent blood work in the chart is from 2019 which showed a hemoglobin of 13 9  Have concern for active upper GI bleeding, given melena, elevated BUN, decreased hemoglobin  Spoke with Dr Geeta Jewell of GI who agreed with admission for the patient  To keep n p o  in case he decides to take him for an EGD today    LULA (acute kidney injury) [facility]): acute illness or injury  Melena: acute illness or injury  Amount and/or Complexity of Data Reviewed  Labs: ordered  Decision-making details documented in ED Course  Radiology: ordered  Risk  Prescription drug management  Decision regarding hospitalization            Disposition  Final diagnoses:   Melena   LULA (acute kidney injury) ([facility] [address] )     Time reflects when diagnosis was documented in both MDM as applicable and the Disposition within this note     Time User Action Codes Description Comment    5/13/2023 10:36 AM Maribel Cid Add [K92 1] Melena     5/13/2023 10:36 AM Maribel Ahr Add [N17 9] LULA (acute kidney injury) [facility])       ED Disposition     ED Disposition   Admit    Condition   Stable    Date/Time   Sat May 13, 2023 10:36 AM    Comment   Case was discussed with GRISEL and the patient's admission status was agreed to be Admission Status: observation status to the service of Dr Azalea Nichole   Follow-up Information    None         Current Discharge Medication List      CONTINUE these medications which have NOT CHANGED    Details   acetaminophen (TYLENOL) 325 mg tablet Take 650 mg by mouth every 6 (six) hours as needed for mild pain      aspirin (ECOTRIN LOW STRENGTH) 81 mg EC tablet Take 81 mg by mouth daily      carBAMazepine (TEGretol XR) 400 mg 12 hr tablet Take 400 mg by mouth 3 (three) times a day      Cyanocobalamin (VITAMIN B-12 PO) Take 1 tablet by mouth daily      Folate-B12-Intrinsic Factor (INTRINSI B12-FOLATE) 145-917-60 MCG-MCG-MG TABS Take by oral route every other day      levETIRAcetam (KEPPRA) 500 mg tablet Take 500 mg by mouth 2 (two) times a day      levothyroxine 75 mcg tablet Take 50 mcg by mouth daily       lisinopril (ZESTRIL) 5 mg tablet Take 5 mg by mouth daily      Naproxen Sodium (ALEVE PO) Take 1 tablet by mouth 2 (two) times a day as needed        Omega-3 Fatty Acids (FISH OIL PO) Take by mouth      rosuvastatin (CRESTOR) 20 MG tablet Take 20 mg by mouth daily             No discharge procedures on file      PDMP Review     None          ED Provider  Electronically Signed by           Janice Simmons DO  05/13/23 8492

## 2023-05-13 NOTE — ANESTHESIA PREPROCEDURE EVALUATION
Procedure:  EGD    Relevant Problems   ANESTHESIA (within normal limits)      CARDIO   (+) Essential hypertension   (+) Hyperlipidemia      ENDO   (+) Hypothyroidism      GI/HEPATIC   (+) Possible upper GI bleed      /RENAL   (+) LULA (acute kidney injury) ([facility] [address] )      HEMATOLOGY   (+) Anemia      NEURO/PSYCH   (+) History of bladder cancer   (+) History of renal cell cancer   (+) Seizure disorder (HCC)        Physical Exam    Airway    Mallampati score: I  TM Distance: >3 FB  Neck ROM: full     Dental   upper dentures and lower dentures,     Cardiovascular  Cardiovascular exam normal    Pulmonary  Pulmonary exam normal     Other Findings        Anesthesia Plan  ASA Score- 2     Anesthesia Type- IV sedation with anesthesia with ASA Monitors  Additional Monitors:   Airway Plan:           Plan Factors-Exercise tolerance (METS): <4 METS  Chart reviewed  EKG reviewed  Imaging results reviewed  Existing labs reviewed  Patient summary reviewed  Patient is not a current smoker  Patient did not smoke on day of surgery  Induction- intravenous  Postoperative Plan-     Informed Consent- Anesthetic plan and risks discussed with patient  I personally reviewed this patient with the CRNA  Discussed and agreed on the Anesthesia Plan with the CRNA  Roseann Menchaca

## 2023-05-14 VITALS
OXYGEN SATURATION: 97 % | RESPIRATION RATE: 18 BRPM | HEART RATE: 83 BPM | DIASTOLIC BLOOD PRESSURE: 59 MMHG | SYSTOLIC BLOOD PRESSURE: 113 MMHG | BODY MASS INDEX: 32.05 KG/M2 | WEIGHT: 241.84 LBS | TEMPERATURE: 98.2 F | HEIGHT: 73 IN

## 2023-05-14 LAB
ANION GAP SERPL CALCULATED.3IONS-SCNC: 5 MMOL/L (ref 4–13)
BUN SERPL-MCNC: 37 MG/DL (ref 5–25)
CALCIUM SERPL-MCNC: 8 MG/DL (ref 8.4–10.2)
CHLORIDE SERPL-SCNC: 111 MMOL/L (ref 96–108)
CO2 SERPL-SCNC: 25 MMOL/L (ref 21–32)
CREAT SERPL-MCNC: 1.13 MG/DL (ref 0.6–1.3)
ERYTHROCYTE [DISTWIDTH] IN BLOOD BY AUTOMATED COUNT: 13 % (ref 11.6–15.1)
FERRITIN SERPL-MCNC: 17 NG/ML (ref 8–388)
GFR SERPL CREATININE-BSD FRML MDRD: 64 ML/MIN/1.73SQ M
GLUCOSE SERPL-MCNC: 107 MG/DL (ref 65–140)
HCT VFR BLD AUTO: 27.7 % (ref 36.5–49.3)
HGB BLD-MCNC: 8.7 G/DL (ref 12–17)
IRON SATN MFR SERPL: 12 % (ref 20–50)
IRON SERPL-MCNC: 34 UG/DL (ref 65–175)
MCH RBC QN AUTO: 31.8 PG (ref 26.8–34.3)
MCHC RBC AUTO-ENTMCNC: 31.4 G/DL (ref 31.4–37.4)
MCV RBC AUTO: 101 FL (ref 82–98)
PLATELET # BLD AUTO: 273 THOUSANDS/UL (ref 149–390)
PMV BLD AUTO: 9.9 FL (ref 8.9–12.7)
POTASSIUM SERPL-SCNC: 4.3 MMOL/L (ref 3.5–5.3)
RBC # BLD AUTO: 2.74 MILLION/UL (ref 3.88–5.62)
SODIUM SERPL-SCNC: 141 MMOL/L (ref 135–147)
TIBC SERPL-MCNC: 291 UG/DL (ref 250–450)
WBC # BLD AUTO: 8.89 THOUSAND/UL (ref 4.31–10.16)

## 2023-05-14 RX ORDER — FERROUS SULFATE TAB EC 324 MG (65 MG FE EQUIVALENT) 324 (65 FE) MG
324 TABLET DELAYED RESPONSE ORAL
Qty: 60 TABLET | Refills: 0 | Status: SHIPPED | OUTPATIENT
Start: 2023-05-14 | End: 2023-06-13

## 2023-05-14 RX ORDER — PANTOPRAZOLE SODIUM 40 MG/1
40 TABLET, DELAYED RELEASE ORAL 2 TIMES DAILY
Qty: 60 TABLET | Refills: 2 | Status: SHIPPED | OUTPATIENT
Start: 2023-05-14 | End: 2023-08-12

## 2023-05-14 RX ADMIN — CARBAMAZEPINE 400 MG: 200 TABLET, EXTENDED RELEASE ORAL at 08:59

## 2023-05-14 RX ADMIN — PANTOPRAZOLE SODIUM 40 MG: 40 INJECTION, POWDER, FOR SOLUTION INTRAVENOUS at 09:47

## 2023-05-14 RX ADMIN — LEVETIRACETAM 500 MG: 500 TABLET, FILM COATED ORAL at 08:59

## 2023-05-14 RX ADMIN — LEVOTHYROXINE SODIUM 50 MCG: 50 TABLET ORAL at 04:48

## 2023-05-14 RX ADMIN — SODIUM CHLORIDE 100 ML/HR: 0.9 INJECTION, SOLUTION INTRAVENOUS at 03:32

## 2023-05-14 NOTE — NURSING NOTE
IV site removed  Discharge instructions given to patient and wife, verbalized understanding    Patient discharged via wheelchair to car accompanied by RN and spouse

## 2023-05-14 NOTE — DISCHARGE SUMMARY
[address]  Discharge- Leila Conrad 1950, 67 y o  male MRN: [id number]  Unit/Bed#: -01 Encounter: [id number]  Primary Care Provider: India Santos MD   Date and time admitted to hospital: 5/13/2023  8:50 AM    * Possible upper GI bleed  Assessment & Plan  · Presented for evaluation of dark black stools  · Last hemoglobin on record was around 15 however this was several years ago  · H/H: 9 9/31 7  · Patient instructed not to resume aleve on discharge  · Anemia panel shows iron deficiency- will start iron supplement on discharge   · GI consultation appreciated  · EGD: Large clean-based ulcer in the pyloric channel with scarring of the pylorus leaving it widely patent likely due to previous peptic ulcer disease  · Biopsies pending     · Protonix 40 mg by mouth twice a day for the next 3 months  · Minimize NSAID use  · Repeat upper endoscopy in 3 months to document healing of the pyloric channel ulcer  · Outpatient follow-up with PCP and GI    LULA (acute kidney injury) ([facility] )  Assessment & Plan  · Creatinine of 1 53 on admission  · Possible LULA versus CKD  · Last creatinine was 0 94, however, this was from 2019  · This is in the setting of a patient of known renal cell carcinoma  · Creatinine improved to 1 13 with IV fluid   · Outpatient follow-up with PCP    Anemia  Assessment & Plan  · Possibly acute blood loss anemia due to GI bleed  · As stated above hemoglobin was previously noted to be 15 however this was several years ago  · This is in the setting of a patient with known history of bladder cancer and renal cell carcinoma  · Further management as above    Hyperlipidemia  Assessment & Plan  · Resume home Crestor     Hypothyroidism  Assessment & Plan  · Continue home levothyroxine 50 mcg daily    Essential hypertension  Assessment & Plan  · Home lisinopril held in the setting of LULA and possible GI bleed  · Resume on discharge    Seizure disorder [facility])  Assessment & Plan  · Continue home carbamazepine 400 mg 3 times daily and Keppra 500 mg twice daily  · Recommend continued outpatient follow-up    History of bladder cancer  Assessment & Plan  · 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 and 2 years of BCG  · Recommend continued outpatient follow-up    History of renal cell cancer  Assessment & Plan  · Status post partial right nephrectomy in 2013  · Recommend continued outpatient follow-up      Medical Problems     Resolved Problems  Date Reviewed: 5/14/2023   None       Discharging Physician / Practitioner: Prince Ring PA-C  PCP: India Santos MD  Admission Date:   Admission Orders (From admission, onward)     Ordered        05/13/23 1037  Place in Observation  Once                      Discharge Date: 05/14/23    Consultations During Hospital Stay:  · Gastroenterology    Procedures Performed:   EGD on 5/13/2023    Significant Findings / Test Results:   EGD    Result Date: 5/13/2023  Impression: Large clean-based ulcer in the pyloric channel with scarring of the pylorus leaving it widely patent likely due to previous peptic ulcer disease Normal esophagus and duodenum RECOMMENDATION:  Await pathology results Treat for Helicobacter pylori infection if this is found on the biopsies Return to floor and resume diet Stable for discharge today from a GI standpoint Protonix 40 mg by mouth twice a day for the next 3 months Minimize NSAID use Repeat upper endoscopy in 3 months to document healing of the pyloric channel ulcer   No Staff Documented     CT abdomen pelvis with contrast    Result Date: 5/13/2023  · Impression: No acute intra-abdominal abnormality  Cholelithiasis  Stable postoperative changes involving the right kidney  Bladder diverticulum  Workstation performed: [id number]   ·     Incidental Findings:   · none       Test Results Pending at Discharge (will require follow up):    · Biopsies taken during EGD- outpatient follow-up with PCP or GI regarding "results     Outpatient Tests Requested:  · none    Complications:  none    Reason for Admission: possible GI bleed, LULA    Hospital Course:   Moraima Beck is a 67 y o  male patient who originally presented to the hospital on 5/13/2023 due to abdominal pain and dark stools  Please see H&P by Dr Reece Mchugh dated 5/13/2023 for complete details of presentation  The patient was noted to be anemia with hemoglobin of 9 9 and LULA with creatinine of 1 53  the patient was started on IV fluids and IV Protonix  GI was consulted and the patient underwent an EGD which showed a large clean-based ulcer in the pyloric channel  Biopsies obtained  GI recommended Protonix 40 mg po BID x 3 months  Repeat EGD in 3 months to evaluated if ulcer healed  Anemia panel revealed the patient was iron deficient  He was started on iron supplementation on discharge  Creatinine improved with IV fluids  The patient was discharged home with instructions to follow-up with PCP and GI  Please see above list of diagnoses and related plan for additional information  Condition at Discharge: good    Discharge Day Visit / Exam:   Subjective:    Vitals: Blood Pressure: 113/59 (05/14/23 0650)  Pulse: 83 (05/14/23 0650)  Temperature: 98 2 °F (36 8 °C) (05/14/23 0650)  Temp Source: Oral (05/13/23 1720)  Respirations: 18 (05/14/23 0650)  Height: 6' 1\" (185 4 cm) (05/13/23 1111)  Weight - Scale: 110 kg (241 lb 13 5 oz) (05/13/23 1111)  SpO2: 97 % (05/14/23 0815)  Exam:   Physical Exam  Vitals and nursing note reviewed  Constitutional:       Appearance: Normal appearance  HENT:      Head: Normocephalic and atraumatic  Cardiovascular:      Rate and Rhythm: Normal rate and regular rhythm  Heart sounds: Normal heart sounds  Pulmonary:      Effort: Pulmonary effort is normal       Breath sounds: Normal breath sounds  Abdominal:      Palpations: Abdomen is soft  Tenderness: There is no abdominal tenderness  Hernia: No hernia is present   " Skin:     General: Skin is warm and dry  Neurological:      General: No focal deficit present  Mental Status: He is alert and oriented to person, place, and time  Psychiatric:         Mood and Affect: Mood normal          Behavior: Behavior normal          Thought Content: Thought content normal          Judgment: Judgment normal           Discussion with Family: Patient declined call to   Discharge instructions/Information to patient and family:   See after visit summary for information provided to patient and family  Provisions for Follow-Up Care:  See after visit summary for information related to follow-up care and any pertinent home health orders  Disposition:   Home    Planned Readmission: no     Discharge Statement:  I spent 25 minutes discharging the patient  This time was spent on the day of discharge  I had direct contact with the patient on the day of discharge  Greater than 50% of the total time was spent examining patient, answering all patient questions, arranging and discussing plan of care with patient as well as directly providing post-discharge instructions  Additional time then spent on discharge activities  Discharge Medications:  See after visit summary for reconciled discharge medications provided to patient and/or family        **Please Note: This note may have been constructed using a voice recognition system**

## 2023-05-14 NOTE — PLAN OF CARE
Problem: PAIN - ADULT  Goal: Verbalizes/displays adequate comfort level or baseline comfort level  Description: Interventions:  - Encourage patient to monitor pain and request assistance  - Assess pain using appropriate pain scale  - Administer analgesics based on type and severity of pain and evaluate response  - Implement non-pharmacological measures as appropriate and evaluate response  - Consider cultural and social influences on pain and pain management  - Notify physician/advanced practitioner if interventions unsuccessful or patient reports new pain  Outcome: Progressing     Problem: INFECTION - ADULT  Goal: Absence or prevention of progression during hospitalization  Description: INTERVENTIONS:  - Assess and monitor for signs and symptoms of infection  - Monitor lab/diagnostic results  - Monitor all insertion sites, i e  indwelling lines, tubes, and drains  - Monitor endotracheal if appropriate and nasal secretions for changes in amount and color  - [place] appropriate cooling/warming therapies per order  - Administer medications as ordered  - Instruct and encourage patient and family to use good hand hygiene technique  - Identify and instruct in appropriate isolation precautions for identified infection/condition  Outcome: Progressing     Problem: GASTROINTESTINAL - ADULT  Goal: Maintains or returns to baseline bowel function  Description: INTERVENTIONS:  - Assess bowel function  - Encourage oral fluids to ensure adequate hydration  - Administer IV fluids if ordered to ensure adequate hydration  - Administer ordered medications as needed  - Encourage mobilization and activity  - Consider nutritional services referral to assist patient with adequate nutrition and appropriate food choices  Outcome: Progressing  Goal: Maintains adequate nutritional intake  Description: INTERVENTIONS:  - Monitor percentage of each meal consumed  - Identify factors contributing to decreased intake, treat as appropriate  - Assist with meals as needed  - Monitor I&O, weight, and lab values if indicated  - Obtain nutrition services referral as needed  Outcome: Progressing

## 2023-05-14 NOTE — OCCUPATIONAL THERAPY NOTE
Occupational Therapy Evaluation     Patient Name: Tiffanie Bynum  JVCSY'W Date: 5/14/2023  Problem List  Principal Problem:    Possible upper GI bleed  Active Problems:    History of renal cell cancer    History of bladder cancer    Seizure disorder ([facility] )    Essential hypertension    Hypothyroidism    Hyperlipidemia    Anemia    LULA (acute kidney injury) ([facility] [address] )    Past Medical History  Past Medical History:   Diagnosis Date    Arthritis     BPH (benign prostatic hyperplasia)     Full dentures     Gross hematuria     Hearing loss     [place] (hard of hearing)     right ear    Hyperlipidemia     Hypothyroid     Lesion of bladder     Low back pain     Malignant neoplasm of overlapping sites of bladder ([facility] [address] )     Malignant neoplasm of right kidney, except renal pelvis ([facility] [address] )     Prediabetes     Renal cyst     Right renal mass     Sciatica     Seizures ([facility] [address] )     last seizure 14 yrs ago    Wears glasses     reading     Past Surgical History  Past Surgical History:   Procedure Laterality Date    COLONOSCOPY      CYSTOSCOPY  2012, 2013, 2014    CYSTOSCOPY N/A 12/22/2017    Procedure: INTRAVESICAL MITOMYCIN;  Surgeon: Jessica Mohamud MD;  [facility];  Service: Urology    CYSTOSCOPY W/ RETROGRADES Bilateral 2012    PARTIAL NEPHRECTOMY Right 2013    CO CYSTO BLADDER W/URETERAL CATHETERIZATION Bilateral 11/30/2018    Procedure: Sindy Memos;  Surgeon: Jessica Mohamud MD;  [facility];  Service: Urology    CO CYSTOURETHROSCOPY W/DEST &/RMVL MED BLADDER RUDY N/A 12/22/2017    Procedure: TRANSURETHRAL RESECTION OF BLADDER TUMOR (TURBT);   Surgeon: Jessica Mohamud MD;  [facility];  Service: Urology    CO CYSTOURETHROSCOPY W/DEST &/RMVL MED BLADDER RUDY N/A 11/30/2018    Procedure: TURBT;  Surgeon: Jessica Mohamud MD;  [facility];  Service: Urology    RENAL CYST EXCISION      questionable malignancy    TRANSURETHRAL RESECTION OF PROSTATE  2012 05/14/23 0837   OT "Last Visit   OT Visit Date 05/14/23   Note Type   Note type Evaluation   Additional Comments Pt seen as a co-eval with PT due to the patient's co-morbidities, clinically unstable presentation, and present impairments which are a regression from the patient's baseline  Pain Assessment   Pain Assessment Tool 0-10   Pain Score No Pain   Restrictions/Precautions   Weight Bearing Precautions Per Order No   Braces or Orthoses   (none reported)   Other Precautions Fall Risk;Pain   Home Living   Type of Home House  (pt reports living in a bi-level)   Home Layout Two level  (0 YAZAN w/ FOS to second level)   Bathroom Shower/Tub Tub/shower unit  (cut out tub)   Bathroom Toilet Standard   Bathroom Equipment   (none reported)   [facility]   Additional Comments Pt reports using SPC at baseline  While in hospital, pt has not been utilizing AD   Prior Function   Level of [place] Independent with ADLs; Independent with functional mobility; Independent with IADLS   Lives With Spouse   Receives Help From Family   IADLs Independent with driving; Independent with meal prep; Independent with medication management   Falls in the last 6 months 1 to 4  (1, \"Last week I was digging a ditch and rolled over in the ditch\")   Vocational Retired   Subjective   Subjective \"I get out of breath when I am outside working\"   ADL   Where Assessed Edge of bed   [address] 5  Supervision/Setup   LB Bathing Assistance 5  Supervision/Setup    [facility] 5  Supervision/Setup   LB Dressing Assistance 5  Supervision/Setup   LB Dressing Deficit Don/doff R sock; Don/doff L sock  (cross over leg technique to doff/don bilateral socks while seated EOB)   Bed Mobility   Additional Comments DNT; pt seated on EOB upon arrival   Transfers   Sit to Stand 5  Supervision   Additional items Verbal cues; Bedrails   Stand to Sit 5  Supervision   Additional items Armrests; Verbal cues   Additional Comments No " AD used; VC for safe hand placement   Functional Mobility   Functional Mobility 5  Supervision   Additional Comments Pt completed functional mobility around hospital room and hallway to simulate long distance IADLs at (S) level w/ no AD  No significant LOB observed; mild instability  Upon return to room, SpO2 84%  Educated patient on pursed lip breathing w/ therapeutic rest break, SpO2 increased to 95%  Additional items   (No AD)   Balance   Static Sitting Good   Dynamic Sitting Good   Static Standing Fair +   Dynamic Standing Fair   Ambulatory Fair -   Activity Tolerance   Activity Tolerance Patient limited by fatigue   Nurse Made Aware Yes, RN made aware of recs/outcomes   RUE Assessment   RUE Assessment WFL   RUE Strength   RUE Overall Strength Within Functional Limits - able to perform ADL tasks with strength   LUE Assessment   LUE Assessment WFL   LUE Strength   LUE Overall Strength Within Functional Limits - able to perform ADL tasks with strength   Vision-Basic Assessment   Current Vision Wears glasses only for reading   Psychosocial   Psychosocial (WDL) WDL   Cognition   Overall Cognitive Status WFL   Arousal/Participation Alert; Responsive; Cooperative   Attention Within functional limits   Orientation Level Oriented X4   Memory Within functional limits   Following Commands Follows all commands and directions without difficulty   Comments Pt agreeable to OT evaluation   Assessment   Limitation Decreased endurance   Prognosis Good   Assessment Pt is a 67 y o  male seen for OT evaluation s/p admit to [facility] on 5/13/2023 w/ GI bleed  Comorbidities affecting pt's functional performance at time of assessment include: hx of bladder cancer, seizure disorder, HTN, hypothroidism, hyperlipidemia, anemia, LULA  Personal factors affecting pt at time of IE include:limited insight into deficits, decreased initiation and engagement  and health management   Prior to admission, pt was (I) with ADLs and IADLs   Upon evaluation: Pt appears to be functioning at/around baseline  Pt completed functional mobility w/out AD at (S) level and no significant LOB observed  Pt completed all ADLs at (S) level with no questions or concerns regarding safe discharge home  Pt does not warrant any further inpatient OT services at this time  From OT standpoint, recommendation at time of d/c would be no rehab needs  Goals   Patient Goals to go home   Plan   OT Frequency Eval only   Recommendation   OT Discharge Recommendation No rehabilitation needs   Additional Comments  The patient's raw score on the AM-PAC Daily Activity inpatient short form is 21, standardized score is 44 27, greater than 39 4  Patients at this level are likely to benefit from discharge to home  Please refer to the recommendation of the Occupational Therapist for safe discharge planning     AM-PAC Daily Activity Inpatient   Lower Body Dressing 3   Bathing 3   Toileting 3   Upper Body Dressing 4   Grooming 4   Eating 4   Daily Activity Raw Score 21   Daily Activity Standardized Score (Calc for Raw Score >=11) 44 27   AM-PAC Applied Cognition Inpatient   Following a Speech/Presentation 4   Understanding Ordinary Conversation 4   Taking Medications 3   Remembering Where Things Are Placed or Put Away 3   Remembering List of 4-5 Errands 3   Taking Care of Complicated Tasks 3   Applied Cognition Raw Score 20   Applied Cognition Standardized Score 41 76     Maria Antonia Kellogg MS, OTR/L

## 2023-05-14 NOTE — PHYSICAL THERAPY NOTE
PHYSICAL THERAPY EVALUATION  NAME:  Luan Kwong  DATE: 05/14/23    AGE:   67 y o  Mrn:   [id number]  ADMIT DX:  Diarrhea [R19 7]  Melena [K92 1]  LULA (acute kidney injury) ([facility] [address] ) [N17 9]  Generalized weakness [R53 1]  Problem List:   Patient Active Problem List   Diagnosis    Malignant neoplasm of overlapping sites of bladder ([facility] )    History of renal cell cancer    History of bladder cancer    Seizure disorder ([facility] )    Essential hypertension    Hypothyroidism    Hyperlipidemia    Possible upper GI bleed    Anemia    LULA (acute kidney injury) ([facility] [address] )       Past Medical History  Past Medical History:   Diagnosis Date    Arthritis     BPH (benign prostatic hyperplasia)     Full dentures     Gross hematuria     Hearing loss     [place] (hard of hearing)     right ear    Hyperlipidemia     Hypothyroid     Lesion of bladder     Low back pain     Malignant neoplasm of overlapping sites of bladder ([facility] )     Malignant neoplasm of right kidney, except renal pelvis ([place] [address] )     Prediabetes     Renal cyst     Right renal mass     Sciatica     Seizures ([place] [address] )     last seizure 14 yrs ago    Wears glasses     reading       Past Surgical History  Past Surgical History:   Procedure Laterality Date    COLONOSCOPY      CYSTOSCOPY  2012, 2013, 2014    CYSTOSCOPY N/A 12/22/2017    Procedure: INTRAVESICAL MITOMYCIN;  Surgeon: Glenn Suarez MD;  [facility];  Service: Urology    CYSTOSCOPY W/ RETROGRADES Bilateral 2012    PARTIAL NEPHRECTOMY Right 2013    ME CYSTO BLADDER W/URETERAL CATHETERIZATION Bilateral 11/30/2018    Procedure: Warren Nixon;  Surgeon: Glenn Suarez MD;  [facility];  Service: Urology    ME CYSTOURETHROSCOPY W/DEST &/RMVL MED BLADDER RUDY N/A 12/22/2017    Procedure: TRANSURETHRAL RESECTION OF BLADDER TUMOR (TURBT);   Surgeon: Glenn Suarez MD;  [facility];  Service: Urology    ME CYSTOURETHROSCOPY W/DEST &/RMVL MED BLADDER RUDY N/A 11/30/2018    Procedure: TURBT; "Surgeon: Moedsta Caballero MD;  [facility];  Service: Urology    RENAL CYST EXCISION      questionable malignancy    TRANSURETHRAL RESECTION OF PROSTATE  2012       Length Of Stay: 0  Performed at least 2 patient identifiers during session: Name and Birthday       05/14/23 0823   PT Last Visit   PT Visit Date 05/14/23   Note Type   Note type Evaluation   Pain Assessment   Pain Assessment Tool 0-10   Pain Score No Pain   Restrictions/Precautions   Weight Bearing Precautions Per Order No   Braces or Orthoses Other (Comment)  (none per pt)   Other Precautions Fall Risk;Pain   Home Living   Type of Home House  (bi-level)   Home Layout Two level  (6+6 to second level  0 YAZAN)   Bathroom Shower/Tub Tub/shower unit  (cut out tub)   Bathroom Toilet Standard  (has raised and standard)   Bathroom Equipment   (none per pt)   [address]   Additional Comments Pt reports ambulation with SPC at baseline   Prior Function   Level of [place] Independent with ADLs; Independent with functional mobility; Independent with IADLS   Lives With Spouse   Receives Help From Family   IADLs Independent with driving; Independent with meal prep; Independent with medication management   Falls in the last 6 months 1 to 4  (\"last week I was digging a ditch and rolled over\")   Vocational Retired   Comments Pt reports he enjoys completing yard work   General   Family/Caregiver Present No   Cognition   Overall Cognitive Status WFL   Arousal/Participation Alert   Attention Within functional limits   Orientation Level Oriented X4   Memory Within functional limits   Following Commands Follows all commands and directions without difficulty   Comments Pt agreeable to PT evaluation   RLE Assessment   RLE Assessment WFL   LLE Assessment   LLE Assessment WFL   Vision-Basic Assessment   Current Vision Wears glasses only for reading   Coordination   Sensation WFL   Light Touch   RLE Light Touch Grossly " intact   LLE Light Touch Grossly intact   Bed Mobility   Additional Comments bed mobility not tested as pt seated EOB at start of session and seated in bedside chair at end of session   Transfers   Sit to Stand 6  Modified independent   Additional items Increased time required   Stand to Sit 6  Modified independent   Additional items Increased time required   Additional Comments no AD used during transfers   Ambulation/Elevation   Gait pattern Forward Flexion;Decreased foot clearance; Short stride;Decreased heel strike;Decreased hip extension   Gait Assistance 5  Supervision   Additional items Assist x 1;Verbal cues   Assistive Device None  (pt declined use of RW)   Distance 100'   Stair Management Assistance 5  Supervision   Additional items Assist x 1;Verbal cues   Stair Management Technique Two rails   Number of Stairs 4   Balance   Static Sitting Good   Dynamic Sitting Good   Static Standing Fair +   Dynamic Standing Fair   Ambulatory Fair -   Activity Tolerance   Activity Tolerance Patient tolerated treatment well   Medical Staff Made Aware Co evaluation with JORDEN Lowe secondary to complex medical condition of pt, possible A of 2 required to achieve and maintain transitional movements, requiring the need of skilled therapeutic intervention of 2 therapists to achieve delivery of services  Assessment   Prognosis Good   Problem List Decreased endurance; Impaired balance;Decreased mobility   Assessment Pt is 67 y o  male seen for low-complexity PT evaluation on 5/14/2023 s/p admit to [facility] on 5/13/2023 w/ GI bleed  PT was consulted to assess pt's functional mobility and d/c needs  Order placed for PT eval and tx, w/ out of bed to chair order  PTA, pt was residing with his wife in a two story home with 6+6 steps to access main level and 0 YAZAN  Pt reports independence at baseline with ADLs, IADLs, and functional mobility without AD   At time of eval, patient greeted seated EOB and completed STS Kamran, ambulated 100' supervision with gait deviations including forward flexed posture and decreased foot clearance, pt ascend/descend 4 steps supervision with BHR and with reciprocal pattern  Upon evaluation, pt presenting with impaired functional mobility d/t decreased endurance, impaired balance and decreased mobility  Pertinent PMHx and current co-morbidities affecting pt's physical performance at time of assessment include: possible upper GI bleed, anemia, LULA  Personal factors affecting pt at time of eval include: stairs to enter home  The following objective measures performed on IE also reveal limitations: AM-PAC 6-Clicks: 89/58  Pt's clinical presentation is currently stable  seen in pt's presentation of advanced age  Overall, pt's rehab potential and prognosis to return to PLOF is good as impacted by objective findings, warranting pt to receive further skilled PT interventions to address identified impairments, activity limitation(s), and participation restriction(s)  Goal for patient is to return home  Pt to benefit from continued PT tx to address deficits as defined above and maximize level of functional independent mobility and consistency in order for pt to increase activity tolerance and decrease fall risk  From PT/mobility standpoint, recommendation at time of d/c would be home with outpatient rehabilitation pending progress in order to facilitate return to PLOF     Goals   Patient Goals to go home   STG Expiration Date 05/24/23   Short Term Goal #1 In 10 days: Perform all bed mobility tasks independently to decrease caregiver burden, Ambulate > 150 ft  with least restrictive assistive device independently w/o LOB and w/ normalized gait pattern 100% of the time, Navigate 12 stairs modified independent with unilateral handrail to facilitate return to previous living environment and Increase all balance 1/2 grade to decrease risk for falls   PT Treatment Day 0   Plan   Treatment/Interventions Functional transfer training; Therapeutic exercise;Elevations; Endurance training;Patient/family training;Bed mobility;Gait training;Spoke to nursing;OT   PT Frequency 2-3x/wk   Recommendation   PT Discharge Recommendation Home with outpatient rehabilitation   AM-PAC Basic Mobility Inpatient   Turning in Flat Bed Without Bedrails 4   Lying on Back to Sitting on Edge of Flat Bed Without Bedrails 4   Moving Bed to Chair 3   Standing Up From Chair Using Arms 4   Walk in Room 3   Climb 3-5 Stairs With Railing 3   Basic Mobility Inpatient Raw Score 21   Basic Mobility Standardized Score 45 55   Highest Level Of Mobility   JH-HLM Goal 6: Walk 10 steps or more   JH-HLM Achieved 7: Walk 25 feet or more   End of Consult   Patient Position at End of Consult All needs within reach; Bedside chair       Time In: Mariusz  Time Out: 0834  Total Evaluation Minutes: 12     Octaviano Roth, PT

## 2023-07-11 ENCOUNTER — LAB (OUTPATIENT)
Dept: LAB | Facility: CLINIC | Age: 73
End: 2023-07-11
Payer: MEDICARE

## 2023-07-11 ENCOUNTER — OFFICE VISIT (OUTPATIENT)
Dept: GASTROENTEROLOGY | Facility: CLINIC | Age: 73
End: 2023-07-11
Payer: MEDICARE

## 2023-07-11 VITALS
DIASTOLIC BLOOD PRESSURE: 68 MMHG | SYSTOLIC BLOOD PRESSURE: 132 MMHG | HEIGHT: 73 IN | OXYGEN SATURATION: 92 % | BODY MASS INDEX: 31.01 KG/M2 | HEART RATE: 87 BPM | RESPIRATION RATE: 18 BRPM | WEIGHT: 234 LBS

## 2023-07-11 DIAGNOSIS — Z12.11 COLON CANCER SCREENING: ICD-10-CM

## 2023-07-11 DIAGNOSIS — D50.9 IRON DEFICIENCY ANEMIA, UNSPECIFIED IRON DEFICIENCY ANEMIA TYPE: Primary | ICD-10-CM

## 2023-07-11 DIAGNOSIS — D50.9 IRON DEFICIENCY ANEMIA, UNSPECIFIED IRON DEFICIENCY ANEMIA TYPE: ICD-10-CM

## 2023-07-11 DIAGNOSIS — K25.9 STOMACH ULCER: ICD-10-CM

## 2023-07-11 LAB
BASOPHILS # BLD AUTO: 0.11 THOUSANDS/ÂΜL (ref 0–0.1)
BASOPHILS NFR BLD AUTO: 1 % (ref 0–1)
EOSINOPHIL # BLD AUTO: 0.17 THOUSAND/ÂΜL (ref 0–0.61)
EOSINOPHIL NFR BLD AUTO: 2 % (ref 0–6)
ERYTHROCYTE [DISTWIDTH] IN BLOOD BY AUTOMATED COUNT: 13 % (ref 11.6–15.1)
FERRITIN SERPL-MCNC: 8 NG/ML (ref 24–336)
HCT VFR BLD AUTO: 40.2 % (ref 36.5–49.3)
HGB BLD-MCNC: 12.4 G/DL (ref 12–17)
IMM GRANULOCYTES # BLD AUTO: 0.03 THOUSAND/UL (ref 0–0.2)
IMM GRANULOCYTES NFR BLD AUTO: 0 % (ref 0–2)
IRON SATN MFR SERPL: 10 % (ref 20–50)
IRON SERPL-MCNC: 36 UG/DL (ref 65–175)
LYMPHOCYTES # BLD AUTO: 1.83 THOUSANDS/ÂΜL (ref 0.6–4.47)
LYMPHOCYTES NFR BLD AUTO: 20 % (ref 14–44)
MCH RBC QN AUTO: 28.6 PG (ref 26.8–34.3)
MCHC RBC AUTO-ENTMCNC: 30.8 G/DL (ref 31.4–37.4)
MCV RBC AUTO: 93 FL (ref 82–98)
MONOCYTES # BLD AUTO: 0.8 THOUSAND/ÂΜL (ref 0.17–1.22)
MONOCYTES NFR BLD AUTO: 9 % (ref 4–12)
NEUTROPHILS # BLD AUTO: 6.42 THOUSANDS/ÂΜL (ref 1.85–7.62)
NEUTS SEG NFR BLD AUTO: 68 % (ref 43–75)
NRBC BLD AUTO-RTO: 0 /100 WBCS
PLATELET # BLD AUTO: 433 THOUSANDS/UL (ref 149–390)
PMV BLD AUTO: 11 FL (ref 8.9–12.7)
RBC # BLD AUTO: 4.33 MILLION/UL (ref 3.88–5.62)
TIBC SERPL-MCNC: 353 UG/DL (ref 250–450)
WBC # BLD AUTO: 9.36 THOUSAND/UL (ref 4.31–10.16)

## 2023-07-11 PROCEDURE — 82728 ASSAY OF FERRITIN: CPT

## 2023-07-11 PROCEDURE — 36415 COLL VENOUS BLD VENIPUNCTURE: CPT

## 2023-07-11 PROCEDURE — 83550 IRON BINDING TEST: CPT

## 2023-07-11 PROCEDURE — 85025 COMPLETE CBC W/AUTO DIFF WBC: CPT

## 2023-07-11 PROCEDURE — 99214 OFFICE O/P EST MOD 30 MIN: CPT | Performed by: NURSE PRACTITIONER

## 2023-07-11 PROCEDURE — 83540 ASSAY OF IRON: CPT

## 2023-07-11 RX ORDER — PANTOPRAZOLE SODIUM 40 MG/1
40 TABLET, DELAYED RELEASE ORAL 2 TIMES DAILY
Qty: 60 TABLET | Refills: 2 | Status: SHIPPED | OUTPATIENT
Start: 2023-07-11 | End: 2023-10-09

## 2023-07-11 RX ORDER — POLYETHYLENE GLYCOL 3350, SODIUM SULFATE ANHYDROUS, SODIUM BICARBONATE, SODIUM CHLORIDE, POTASSIUM CHLORIDE 236; 22.74; 6.74; 5.86; 2.97 G/4L; G/4L; G/4L; G/4L; G/4L
4000 POWDER, FOR SOLUTION ORAL ONCE
Qty: 4000 ML | Refills: 0 | Status: SHIPPED | OUTPATIENT
Start: 2023-07-11 | End: 2023-07-11

## 2023-07-11 NOTE — PROGRESS NOTES
[facility] - Outpatient Follow-up Note  Lucía Gil 67 y.o. male MRN: [id number]  Encounter: [id number]          ASSESSMENT AND PLAN:    The patient presents today for follow-up from his previous hospitalization for melena and underwent an EGD, which showed evidence of peptic ulcer disease and healing bleeding ulcer and to discuss his repeat EGD to evaluate for healing of the ulcer. Exam: Abdomen is softly distended, nontender, with normal bowel sounds x4. No edema noted of the b/l lower extremities noted upon exam today. Skin is non-icteric. 1. Iron deficiency anemia, unspecified iron deficiency anemia type  2. Stomach ulcer   I discussed with the patient that since it has been several weeks since his last blood work, I feel will be to proceed with updated CBC with differential and iron panel to see where he is at with regards to the anemia. I advised the patient that we will contact him once we have the results. At this time we will proceed with the follow-up EGD for surveillance of healing of the peptic ulcer. In the meantime the patient is to continue the Protonix as prescribed for now. The patient was agreeable and verbalized an understanding. I discussed the risks of procedure with the patient including, but not limited to: bleeding, infection, sore throat, and perforation. The patient gave verbal understanding and is agreeable to proceed. - CBC and differential; Future  - Iron Panel (Includes Ferritin, Iron Sat%, Iron, and TIBC); Future  - Colonoscopy; Future  - EGD; Future  - pantoprazole (PROTONIX) 40 mg tablet; Take 1 tablet (40 mg total) by mouth 2 (two) times a day  Dispense: 60 tablet; Refill: 2    3.  Colon cancer screening  I discussed with the patient that since he is definitely overdue for colon cancer screening and proceeding with a repeat EGD, I feel it is reasonable and appropriate to proceed with a colonoscopy at the same time as the EGD as well as this would most likely be his last colon cancer screening that would be needed secondary to his age. The patient was agreeable and verbalized an understanding. I discussed the risks of procedure with the patient including, but not limited to: bleeding, infection, perforation, and spleen injury. The patient gave verbal understanding and is agreeable to proceed. Bowel prep instructions were reviewed and given as ordered. - Colonoscopy; Future  - polyethylene glycol (Golytely) 4000 mL solution; Take 4,000 mL by mouth once for 1 dose Take 4000 mL by mouth once for 1 dose. Use as directed  Dispense: 4000 mL; Refill: 0    The patient will schedule a follow up office visit in 12 weeks, but understands to call or contact our office if there are any issues or concerns in the mean time. ______________________________________________________________________    SUBJECTIVE: Patient is a 67 y.o. male who was previously seen in the hospital for melena and underwent an EDG which showed evidence of peptic ulcer disease and healing bleeding ulcer. The patient also has a history of bladder cancer that was treated and he is cancer free. Since being discharged from the hospital he did proceed with 4 weeks of iron supplementation and then followed up for blood work which did show improvement, however, has not had any repeat blood work since. The patient reports that overall he is doing well and is taking the Protonix twice daily as prescribed without any pain or symptoms. The patient presents today for follow-up visit and reevaluation to proceed with the repeat EGD as recommended. The patient denies any reflux, chest pain, nausea, dysphagia, shortness of breath, vomiting, early satiety, decreased appetite, unplanned weight loss, or abdominal pain.      The patient reports that they have consistent, relieving, and regular bowel movements on a daily basis without any bloating, consistent diarrhea, nocturnal BMs, constipation, straining, melena or bloody stools. Meds: Protonix 40 mg twice daily  NSAID Use: Denied    Tobacco: Quit 10 years ago. Former 1 PPD. Denied vaping  ETOH: Denied  Marijuana: Denied  Illicit Drug Use: Denied    Imaging: (5/13/23): CT of the abdomen and pelvis with contrast: Cholelithiasis. Endoscopy History: EGD: (5/13/23): Large clean-based ulcer in the pyloric channel with scarring of the pylorus leaving it widely patent likely due to previous peptic ulcer disease. RECOMMENDATION:    Await pathology results: Normal, neg for H Pylori.      Treat for Helicobacter pylori infection if this is found on the biopsies  Return to floor and resume diet  Stable for discharge today from a GI standpoint  Protonix 40 mg by mouth twice a day for the next 3 months  Minimize NSAID use  Repeat upper endoscopy in 3 months to document healing of the pyloric channel ulcer    COLONOSCOPY: (Over 10 years ago): Normal.      DUE: NOW    REVIEW OF SYSTEMS IS OTHERWISE NEGATIVE.       Historical Information   Past Medical History:   Diagnosis Date   • Arthritis    • BPH (benign prostatic hyperplasia)    • Full dentures    • Gross hematuria    • Hearing loss    • [place] (hard of hearing)     right ear   • Hyperlipidemia    • Hypothyroid    • Lesion of bladder    • Low back pain    • Malignant neoplasm of overlapping sites of bladder [facility])    • Malignant neoplasm of right kidney, except renal pelvis (HCC)    • Prediabetes    • Renal cyst    • Right renal mass    • Sciatica    • Seizures ([address])     last seizure 14 yrs ago   • Wears glasses     reading     Past Surgical History:   Procedure Laterality Date   • COLONOSCOPY     • CYSTOSCOPY  2012, 2013, 2014   • CYSTOSCOPY N/A 12/22/2017    Procedure: INTRAVESICAL MITOMYCIN;  Surgeon: Beatrice Milton MD;  [facility];  Service: Urology   • CYSTOSCOPY W/ RETROGRADES Bilateral 2012   • PARTIAL NEPHRECTOMY Right 2013   • AR CYSTO BLADDER W/URETERAL CATHETERIZATION Bilateral 2018    Procedure: CYSTO, RETROGRADE PYELOGRAM;  Surgeon: Vish De Oliveira MD;  [facility];  Service: Urology   • MN CYSTOURETHROSCOPY W/DEST &/RMVL MED BLADDER RUDY N/A 2017    Procedure: TRANSURETHRAL RESECTION OF BLADDER TUMOR (TURBT); Surgeon: Vish De Oliveira MD;  [facility];  Service: Urology   • MN CYSTOURETHROSCOPY W/DEST &/RMVL MED BLADDER RUDY N/A 2018    Procedure: TURBT;  Surgeon: Vish De Oliveira MD;  [facility];  Service: Urology   • RENAL CYST EXCISION      questionable malignancy   • TRANSURETHRAL RESECTION OF PROSTATE       Social History   Social History     Substance and Sexual Activity   Alcohol Use No    Comment: Former drinker. Social History     Substance and Sexual Activity   Drug Use No     Social History     Tobacco Use   Smoking Status Former   • Packs/day: 2.00   • Years: 40.00   • Total pack years: 80.00   • Types: Cigarettes   • Quit date: 2014   • Years since quittin.5   Smokeless Tobacco Never     Family History   Problem Relation Age of Onset   • Diabetes Family        Meds/Allergies       Current Outpatient Medications:   •  acetaminophen (TYLENOL) 325 mg tablet  •  aspirin (ECOTRIN LOW STRENGTH) 81 mg EC tablet  •  carBAMazepine (TEGretol XR) 400 mg 12 hr tablet  •  Cyanocobalamin (VITAMIN B-12 PO)  •  Folate-B12-Intrinsic Factor (INTRINSI B12-FOLATE) 800-500-20 MCG-MCG-MG TABS  •  levETIRAcetam (KEPPRA) 500 mg tablet  •  levothyroxine 75 mcg tablet  •  lisinopril (ZESTRIL) 5 mg tablet  •  Omega-3 Fatty Acids (FISH OIL PO)  •  pantoprazole (PROTONIX) 40 mg tablet  •  polyethylene glycol (Golytely) 4000 mL solution  •  rosuvastatin (CRESTOR) 20 MG tablet  •  ferrous sulfate 324 (65 Fe) mg    Allergies   Allergen Reactions   • Bactrim [Sulfamethoxazole-Trimethoprim] Itching, Hives and Rash     Rash, itching   • Atorvastatin Drowsiness           Objective     Blood pressure 132/68, pulse 87, resp.  rate 18, height 6' 1" (1.854 m), weight 106 kg (234 lb), SpO2 92 %. Body mass index is 30.87 kg/m². PHYSICAL EXAM:      General Appearance:   Alert, cooperative, no distress   HEENT:   Normocephalic, atraumatic, anicteric. Neck:  Supple, symmetrical, trachea midline   Lungs:   Clear to auscultation bilaterally; no rales, rhonchi or wheezing; respirations unlabored    Heart[de-identified]   Regular rate and rhythm; no murmur, rub, or gallop. Abdomen:   Soft, non-tender, non-distended; normal bowel sounds; no masses, no organomegaly    Genitalia:   Deferred    Rectal:   Deferred    Extremities:  No cyanosis, clubbing or edema    Pulses:  2+ and symmetric    Skin:  No jaundice, rashes, or lesions    Lymph nodes:  No palpable cervical lymphadenopathy        Lab Results:   No visits with results within 1 Day(s) from this visit.    Latest known visit with results is:   Admission on 05/13/2023, Discharged on 05/14/2023   Component Date Value   • WBC 05/13/2023 12.31 (H)    • RBC 05/13/2023 3.17 (L)    • Hemoglobin 05/13/2023 9.9 (L)    • Hematocrit 05/13/2023 31.7 (L)    • MCV 05/13/2023 100 (H)    • MCH 05/13/2023 31.2    • MCHC 05/13/2023 31.2 (L)    • RDW 05/13/2023 13.0    • MPV 05/13/2023 10.5    • Platelets 77/35/0556 327    • nRBC 05/13/2023 0    • Neutrophils Relative 05/13/2023 76 (H)    • Immat GRANS % 05/13/2023 1    • Lymphocytes Relative 05/13/2023 15    • Monocytes Relative 05/13/2023 6    • Eosinophils Relative 05/13/2023 1    • Basophils Relative 05/13/2023 1    • Neutrophils Absolute 05/13/2023 9.46 (H)    • Immature Grans Absolute 05/13/2023 0.06    • Lymphocytes Absolute 05/13/2023 1.88    • Monocytes Absolute 05/13/2023 0.74    • Eosinophils Absolute 05/13/2023 0.09    • Basophils Absolute 05/13/2023 0.08    • Sodium 05/13/2023 140    • Potassium 05/13/2023 4.2    • Chloride 05/13/2023 108    • CO2 05/13/2023 25    • ANION GAP 05/13/2023 7    • BUN 05/13/2023 64 (H)    • Creatinine 05/13/2023 1.53 (H)    • Glucose 05/13/2023 149 (H)    • Calcium 05/13/2023 8.8    • AST 05/13/2023 9 (L)    • ALT 05/13/2023 7    • Alkaline Phosphatase 05/13/2023 68    • Total Protein 05/13/2023 6.6    • Albumin 05/13/2023 3.9    • Total Bilirubin 05/13/2023 0.35    • eGFR 05/13/2023 44    • Protime 05/13/2023 13.9    • INR 05/13/2023 1.07    • PTT 05/13/2023 25    • ABO Grouping 05/13/2023 O    • Rh Factor 05/13/2023 Negative    • Antibody Screen 05/13/2023 Negative    • Specimen Expiration Date 05/13/2023 [id number]    • Ventricular Rate 05/13/2023 85    • Atrial Rate 05/13/2023 85    • AK Interval 05/13/2023 196    • QRSD Interval 05/13/2023 98    • QT Interval 05/13/2023 354    • QTC Interval 05/13/2023 421    • P Axis 05/13/2023 58    • QRS Axis 05/13/2023 47    • T Wave Axis 05/13/2023 56    • ABO Grouping 05/13/2023 O    • Rh Factor 05/13/2023 Negative    • Iron Saturation 05/13/2023 12 (L)    • TIBC 05/13/2023 291    • Iron 05/13/2023 34 (L)    • Ferritin 05/13/2023 17    • Hemoglobin 05/13/2023 9.8 (L)    • Case Report 05/13/2023                      Value:Surgical Pathology Report                         Case: [id number]                                   Authorizing Provider:  Lee Nino MD           Collected:           05/13/2023 1648              Ordering Location:     [address] [place] [address] Received:            05/13/2023 1701                                     Medical Surgical Unit                                                        Pathologist:           Bianca San MD                                                                 Specimen:    Stomach, GASTRIC BX, R/O H PYLORI                                                         • Addendum 05/13/2023                      Value: This result contains rich text formatting which cannot be displayed here. • Final Diagnosis 05/13/2023                      Value: This result contains rich text formatting which cannot be displayed here.    • Note 05/13/2023 Value:This result contains rich text formatting which cannot be displayed here. • Additional Information 05/13/2023                      Value: This result contains rich text formatting which cannot be displayed here. • Gross Description 05/13/2023                      Value: This result contains rich text formatting which cannot be displayed here. • WBC 05/14/2023 8.89    • RBC 05/14/2023 2.74 (L)    • Hemoglobin 05/14/2023 8.7 (L)    • Hematocrit 05/14/2023 27.7 (L)    • MCV 05/14/2023 101 (H)    • MCH 05/14/2023 31.8    • MCHC 05/14/2023 31.4    • RDW 05/14/2023 13.0    • Platelets 16/55/8884 273    • MPV 05/14/2023 9.9    • Sodium 05/14/2023 141    • Potassium 05/14/2023 4.3    • Chloride 05/14/2023 111 (H)    • CO2 05/14/2023 25    • ANION GAP 05/14/2023 5    • BUN 05/14/2023 37 (H)    • Creatinine 05/14/2023 1.13    • Glucose 05/14/2023 107    • Calcium 05/14/2023 8.0 (L)    • eGFR 05/14/2023 64          Radiology Results:   No results found.

## 2023-07-11 NOTE — PATIENT INSTRUCTIONS
Proceed with blood work. Continue Protonix as ordered. Proceed with EGD & Colonoscopy  Schedule a follow up office visit in 12 weeks.        Scheduled date of EGD/colonoscopy (as of today):08/11/2023   Physician performing EGD/colonoscopy: Liseth  Location of EGD/colonoscopy: [place]  Desired bowel prep reviewed with patient: Golytely  Instructions reviewed with patient by: Celi  Clearances:   none

## 2023-07-12 DIAGNOSIS — D64.9 ANEMIA, UNSPECIFIED TYPE: ICD-10-CM

## 2023-07-12 PROBLEM — Z12.11 COLON CANCER SCREENING: Status: ACTIVE | Noted: 2023-07-12

## 2023-07-12 PROBLEM — K25.9 STOMACH ULCER: Status: ACTIVE | Noted: 2023-07-12

## 2023-07-12 RX ORDER — FERROUS SULFATE TAB EC 324 MG (65 MG FE EQUIVALENT) 324 (65 FE) MG
TABLET DELAYED RESPONSE ORAL
Qty: 45 TABLET | Refills: 2 | Status: SHIPPED | OUTPATIENT
Start: 2023-07-12 | End: 2023-07-20 | Stop reason: SDUPTHER

## 2023-07-12 NOTE — RESULT ENCOUNTER NOTE
Can you please call the patient and let him know that his blood work shows that his H/H is improving, but his iron levels have dropped again and are considered low. I would like him to re-start the iron tablets BID daily for 2 weeks, then 1 PO QD until he is seen again in the office. I did send a script to the pharmacy.

## 2023-07-20 DIAGNOSIS — D64.9 ANEMIA, UNSPECIFIED TYPE: ICD-10-CM

## 2023-07-20 RX ORDER — FERROUS SULFATE TAB EC 324 MG (65 MG FE EQUIVALENT) 324 (65 FE) MG
TABLET DELAYED RESPONSE ORAL
Qty: 60 TABLET | Refills: 2 | Status: SHIPPED | OUTPATIENT
Start: 2023-07-20

## 2023-07-21 ENCOUNTER — TELEPHONE (OUTPATIENT)
Dept: HEMATOLOGY ONCOLOGY | Facility: CLINIC | Age: 73
End: 2023-07-21

## 2023-07-21 NOTE — TELEPHONE ENCOUNTER
I called Shelly Gonzalez in response to a referral that was received for patient to establish care with Hematology. Outreach was made to schedule a consultation. .    I left a voicemail explaining the reason for my call and advised patient to call [facility] at [phone]. Another attempt will be made to contact patient.

## 2023-07-24 ENCOUNTER — TELEPHONE (OUTPATIENT)
Dept: HEMATOLOGY ONCOLOGY | Facility: CLINIC | Age: 73
End: 2023-07-24

## 2023-07-24 NOTE — TELEPHONE ENCOUNTER
I called Luca Gann in response to a referral that was received for patient to establish care with Hematology. Outreach was made to schedule a consultation. .    Patient declined scheduling. The referral has been closed.

## 2023-08-11 ENCOUNTER — ANESTHESIA EVENT (OUTPATIENT)
Dept: GASTROENTEROLOGY | Facility: HOSPITAL | Age: 73
End: 2023-08-11

## 2023-08-11 ENCOUNTER — ANESTHESIA (OUTPATIENT)
Dept: GASTROENTEROLOGY | Facility: HOSPITAL | Age: 73
End: 2023-08-11

## 2023-08-11 ENCOUNTER — HOSPITAL ENCOUNTER (OUTPATIENT)
Dept: GASTROENTEROLOGY | Facility: HOSPITAL | Age: 73
Setting detail: OUTPATIENT SURGERY
End: 2023-08-11
Payer: MEDICARE

## 2023-08-11 VITALS
DIASTOLIC BLOOD PRESSURE: 68 MMHG | TEMPERATURE: 97 F | OXYGEN SATURATION: 92 % | HEART RATE: 72 BPM | SYSTOLIC BLOOD PRESSURE: 111 MMHG | RESPIRATION RATE: 18 BRPM

## 2023-08-11 DIAGNOSIS — D50.9 IRON DEFICIENCY ANEMIA, UNSPECIFIED IRON DEFICIENCY ANEMIA TYPE: ICD-10-CM

## 2023-08-11 DIAGNOSIS — Z12.11 COLON CANCER SCREENING: ICD-10-CM

## 2023-08-11 DIAGNOSIS — K25.9 STOMACH ULCER: ICD-10-CM

## 2023-08-11 PROCEDURE — 88305 TISSUE EXAM BY PATHOLOGIST: CPT | Performed by: PATHOLOGY

## 2023-08-11 PROCEDURE — 45378 DIAGNOSTIC COLONOSCOPY: CPT | Performed by: INTERNAL MEDICINE

## 2023-08-11 PROCEDURE — 43239 EGD BIOPSY SINGLE/MULTIPLE: CPT | Performed by: INTERNAL MEDICINE

## 2023-08-11 RX ORDER — PROPOFOL 10 MG/ML
INJECTION, EMULSION INTRAVENOUS AS NEEDED
Status: DISCONTINUED | OUTPATIENT
Start: 2023-08-11 | End: 2023-08-11

## 2023-08-11 RX ORDER — SODIUM CHLORIDE, SODIUM LACTATE, POTASSIUM CHLORIDE, CALCIUM CHLORIDE 600; 310; 30; 20 MG/100ML; MG/100ML; MG/100ML; MG/100ML
125 INJECTION, SOLUTION INTRAVENOUS CONTINUOUS
Status: DISCONTINUED | OUTPATIENT
Start: 2023-08-11 | End: 2023-08-15 | Stop reason: HOSPADM

## 2023-08-11 RX ORDER — PROPOFOL 10 MG/ML
INJECTION, EMULSION INTRAVENOUS CONTINUOUS PRN
Status: DISCONTINUED | OUTPATIENT
Start: 2023-08-11 | End: 2023-08-11

## 2023-08-11 RX ORDER — LIDOCAINE HYDROCHLORIDE 20 MG/ML
INJECTION, SOLUTION EPIDURAL; INFILTRATION; INTRACAUDAL; PERINEURAL AS NEEDED
Status: DISCONTINUED | OUTPATIENT
Start: 2023-08-11 | End: 2023-08-11

## 2023-08-11 RX ADMIN — LIDOCAINE HYDROCHLORIDE 100 MG: 20 INJECTION, SOLUTION EPIDURAL; INFILTRATION; INTRACAUDAL; PERINEURAL at 08:43

## 2023-08-11 RX ADMIN — PROPOFOL 150 MCG/KG/MIN: 10 INJECTION, EMULSION INTRAVENOUS at 08:55

## 2023-08-11 RX ADMIN — PROPOFOL 150 MG: 10 INJECTION, EMULSION INTRAVENOUS at 08:43

## 2023-08-11 RX ADMIN — SODIUM CHLORIDE, SODIUM LACTATE, POTASSIUM CHLORIDE, AND CALCIUM CHLORIDE: .6; .31; .03; .02 INJECTION, SOLUTION INTRAVENOUS at 08:24

## 2023-08-11 RX ADMIN — PROPOFOL 100 MG: 10 INJECTION, EMULSION INTRAVENOUS at 08:46

## 2023-08-11 RX ADMIN — SODIUM CHLORIDE, SODIUM LACTATE, POTASSIUM CHLORIDE, AND CALCIUM CHLORIDE 125 ML/HR: .6; .31; .03; .02 INJECTION, SOLUTION INTRAVENOUS at 07:15

## 2023-08-11 NOTE — ANESTHESIA PREPROCEDURE EVALUATION
Procedure:  COLONOSCOPY  EGD    Relevant Problems   CARDIO   (+) Essential hypertension   (+) Hyperlipidemia      ENDO   (+) Hypothyroidism      GI/HEPATIC   (+) Possible upper GI bleed   (+) Stomach ulcer      /RENAL   (+) LULA (acute kidney injury) ([address] [facility])      HEMATOLOGY   (+) Anemia      NEURO/PSYCH   (+) Seizure disorder (HCC)      Genitourinary   (+) Malignant neoplasm of overlapping sites of bladder [facility])        Physical Exam    Airway    Mallampati score: I  TM Distance: >3 FB  Neck ROM: full     Dental   upper dentures and lower dentures,     Cardiovascular      Pulmonary      Other Findings        Anesthesia Plan  ASA Score- 2     Anesthesia Type- IV sedation with anesthesia with ASA Monitors. Additional Monitors:   Airway Plan:           Plan Factors-Exercise tolerance (METS): >4 METS. Chart reviewed. EKG reviewed. Existing labs reviewed. Patient summary reviewed. Patient is not a current smoker. Induction- intravenous. Postoperative Plan-     Informed Consent- Anesthetic plan and risks discussed with patient. I personally reviewed this patient with the CRNA. Discussed and agreed on the Anesthesia Plan with the CRNA. Mariah Brooms           VITALS  /81   Pulse 87   Temp (!) 96.7 °F (35.9 °C) (Temporal)   Resp 18   SpO2 93%   BP Readings from Last 3 Encounters:   08/11/23 140/81   07/11/23 132/68   05/14/23 113/59     LABS  Results from Last 12 Months   Lab Units 05/14/23  0445 05/13/23  0907   SODIUM mmol/L 141 140   POTASSIUM mmol/L 4.3 4.2   CHLORIDE mmol/L 111* 108   CO2 mmol/L 25 25   ANION GAP mmol/L 5 7   BUN mg/dL 37* 64*   CREATININE mg/dL 1.13 1.53*   CALCIUM mg/dL 8.0* 8.8   GLUCOSE RANDOM mg/dL 107 149*   AST U/L  --  9*   ALT U/L  --  7   ALK PHOS U/L  --  68   TOTAL BILIRUBIN mg/dL  --  0.35   ALBUMIN g/dL  --  3.9     Results from Last 12 Months   Lab Units 07/11/23  1343 05/14/23  0445   WBC Thousand/uL 9.36 8.89   HEMOGLOBIN g/dL 12.4 8.7*   HEMATOCRIT % 40.2 27.7* PLATELETS Thousands/uL 433* 273     Results from Last 12 Months   Lab Units 05/13/23  0907   INR  1.07   PTT seconds 25       ECG  Encounter Date: 05/13/23   ECG 12 lead   Result Value    Ventricular Rate 85    Atrial Rate 85    MA Interval 196    QRSD Interval 98    QT Interval 354    QTC Interval 421    P Axis 58    QRS Axis 47    T Wave Axis 56    Narrative    Normal sinus rhythm  Nonspecific T wave abnormality  Abnormal ECG  When compared with ECG of 14-NOV-2018 09:55,  No significant change was found  Confirmed by Angelia Terry ([id number]) on 5/13/2023 1:54:02 PM     No results found for this or any previous visit. ECHOCARDIOGRAPHY, OTHER CARDIAC TESTING, AND IMAGING  n/a    ANESTHESIA RISK-BENEFIT DISCUSSION  BENEFITS INCLUDE THE FOLLOWING ([address] [id number], PMID [id number]): (1) Involvement of a dedicated anesthesia team reduces mortality and morbidity for major surgeries, (2) The team provides as much analgesia/sedation/amnesia/akinesia as is reasonably possible, and (3) The team strives to reduce pain and discomfort as much as reasonably possible. RISKS (AND PLANS TO MITIGATE RISKS) INCLUDES THE FOLLOWING:    Neurologic Risks: IntraOp awareness (Risk is ~1:1,000 - 1:14,000; PMID [id number]), Stroke (Risk ~<0.1-2% for most cases; PMID 24774028), and POCD. Airway and Pulmonary Risks: Dental or mouth injury, throat pain, critical hypoxia, pneumothorax, prolonged intubation, post-op respiratory compromise. • Airway/Intubation risks and information:  Mask Ventilation: Mask ventilation not attempted (0); Brand: LMA; Size: 5; Insertion Attempts: 1; Placement Verify: Auscultation, End tidal CO2;   • Major ARISCAT risk factors include: none, (Score 0-1= Low risk, 1.6%). Cardiovascular Risks: Hypotension, arrhythmias, and rafael-op cardiac injury (MACE). In cases where specialized vascular access is needed, then additional risks including bleeding, infection, or injury to adjacent structures.  If a bypass circuit is needed then risk of stroke, blood clot, and vasoplegia. • Signs of active, severe cardiac instability: none  • Gm's RCRI score items: none  • MACE risk: An RCRI score of 0= 0.4% risk  • Are rafael-op or intra-op beta blockers indicated?: no   FEN/GI Risks: Aspiration (Approximately 0.5% risk per the IRIS trial) and PONV (10-80% depending on Apfel criteria). • Does the patient meet ASA NPO guidelines?: Yes   Adverse drug reaction risks: Allergic reactions, overdoses, drug-drug interactions, injury to a fetus or  in pregnant or breastfeeding patients, increased risk of injury or accident if performing potentially hazardous tasks before anesthetic medications have been fully excreted/metabolized.   • Recent medications relevant to anesthetic plan: See MAR  • Personal or family history of anesthesia complications: no  • Pregnancy Status: Not applicable   Mortality risks associated with anesthesia based on ASA-PS (PMID [id number]): ASA-PS II: Estimated risk  1:20,000

## 2023-08-11 NOTE — H&P
History and Physical - [facility]  June Crespo 67 y.o. male MRN: [id number]    HPI: June Crespo is a 67y.o. year old male who presents for EGD for recurrent GASTON and surveillance colon. REVIEW OF SYSTEMS: Per the HPI, and otherwise unremarkable. Historical Information   Past Medical History:   Diagnosis Date   • Arthritis    • BPH (benign prostatic hyperplasia)    • Full dentures    • Gross hematuria    • Hearing loss    • [place] (hard of hearing)     right ear   • Hyperlipidemia    • Hypothyroid    • Lesion of bladder    • Low back pain    • Malignant neoplasm of overlapping sites of bladder [facility])    • Malignant neoplasm of right kidney, except renal pelvis (HCC)    • Prediabetes    • Renal cyst    • Right renal mass    • Sciatica    • Seizures ([address])     last seizure 14 yrs ago   • Wears glasses     reading     Past Surgical History:   Procedure Laterality Date   • COLONOSCOPY     • CYSTOSCOPY  2012, 2013, 2014   • CYSTOSCOPY N/A 12/22/2017    Procedure: INTRAVESICAL MITOMYCIN;  Surgeon: Flor Cole MD;  [facility];  Service: Urology   • CYSTOSCOPY W/ RETROGRADES Bilateral 2012   • PARTIAL NEPHRECTOMY Right 2013   • MO CYSTO BLADDER W/URETERAL CATHETERIZATION Bilateral 11/30/2018    Procedure: [place];  Surgeon: Flor Cole MD;  [facility];  Service: Urology   • MO CYSTOURETHROSCOPY W/DEST &/RMVL MED BLADDER RUDY N/A 12/22/2017    Procedure: TRANSURETHRAL RESECTION OF BLADDER TUMOR (TURBT);   Surgeon: Flor Cole MD;  [facility];  Service: Urology   • MO CYSTOURETHROSCOPY W/DEST &/RMVL MED BLADDER RUDY N/A 11/30/2018    Procedure: TURBT;  Surgeon: Flor Cole MD;  [facility];  Service: Urology   • RENAL CYST EXCISION      questionable malignancy   • TRANSURETHRAL RESECTION OF PROSTATE  2012     Social History   Social History     Substance and Sexual Activity   Alcohol Use No    Comment: Former drinker. Social History     Substance and Sexual Activity   Drug Use No     Social History     Tobacco Use   Smoking Status Former   • Packs/day: 2.00   • Years: 40.00   • Total pack years: 80.00   • Types: Cigarettes   • Quit date: 2014   • Years since quittin.6   Smokeless Tobacco Never     Family History   Problem Relation Age of Onset   • Diabetes Family        Meds/Allergies       Current Outpatient Medications:   •  acetaminophen (TYLENOL) 325 mg tablet  •  aspirin (ECOTRIN LOW STRENGTH) 81 mg EC tablet  •  carBAMazepine (TEGretol XR) 400 mg 12 hr tablet  •  Cyanocobalamin (VITAMIN B-12 PO)  •  Folate-B12-Intrinsic Factor (INTRINSI B12-FOLATE) 800-500-20 MCG-MCG-MG TABS  •  levETIRAcetam (KEPPRA) 500 mg tablet  •  levothyroxine 75 mcg tablet  •  lisinopril (ZESTRIL) 5 mg tablet  •  Omega-3 Fatty Acids (FISH OIL PO)  •  pantoprazole (PROTONIX) 40 mg tablet  •  polyethylene glycol (Golytely) 4000 mL solution  •  rosuvastatin (CRESTOR) 20 MG tablet  •  ferrous sulfate 324 (65 Fe) mg    Current Facility-Administered Medications:   •  lactated ringers infusion, 125 mL/hr, Intravenous, Continuous, 125 mL/hr at 23 0715    Allergies   Allergen Reactions   • Bactrim [Sulfamethoxazole-Trimethoprim] Itching, Hives and Rash     Rash, itching   • Atorvastatin Drowsiness       Objective   /81   Pulse 87   Temp (!) 96.7 °F (35.9 °C) (Temporal)   Resp 18   SpO2 93%     PHYSICAL EXAM  Gen: NAD  Head: NCAT  CV: RRR  CHEST: CTAB  ABD: soft, NT/ND  EXT: no edema    ASSESSMENT/PLAN:  This is a 67y.o. year old male here for EGD and colon, and he is stable and optimized for his procedure.

## 2023-08-11 NOTE — ANESTHESIA POSTPROCEDURE EVALUATION
Post-Op Assessment Note    CV Status:  Stable  Pain Score: 0    Pain management: adequate     Mental Status:  Sleepy   Hydration Status:  Euvolemic   PONV Controlled:  Controlled   Airway Patency:  Patent      Post Op Vitals Reviewed: Yes      Staff: CRNA         No notable events documented.     BP   124/73   Temp     Pulse  71   Resp   12   SpO2   99

## 2023-08-15 ENCOUNTER — TELEPHONE (OUTPATIENT)
Dept: GASTROENTEROLOGY | Facility: CLINIC | Age: 73
End: 2023-08-15

## 2023-08-15 NOTE — TELEPHONE ENCOUNTER
----- Message from Remington Alves DO sent at 8/11/2023  9:39 AM EDT -----  Please schedule repeat colonoscopy due to poor prep. 2 day bowel prep please

## 2023-08-16 PROCEDURE — 88305 TISSUE EXAM BY PATHOLOGIST: CPT | Performed by: PATHOLOGY

## 2023-08-17 NOTE — PROGRESS NOTES
[facility] - PROCEDURE RESPONSE NOTE    Based on the query that was sent to you, please document below any procedures that were performed. Polyps not removed at this time due to inadequate bowel preparation and plan for repeat procedure.

## 2023-09-06 ENCOUNTER — TELEPHONE (OUTPATIENT)
Dept: GASTROENTEROLOGY | Facility: CLINIC | Age: 73
End: 2023-09-06

## 2023-09-06 ENCOUNTER — TELEPHONE (OUTPATIENT)
Age: 73
End: 2023-09-06

## 2023-09-06 NOTE — TELEPHONE ENCOUNTER
Spoke with patient to reschedule colonoscopy. Patient declined to schedule repeat colonoscopy. He would like an order placed for a repeat iron test to have completed at the end of September. Please advise.

## 2023-09-07 DIAGNOSIS — D50.9 IRON DEFICIENCY ANEMIA, UNSPECIFIED IRON DEFICIENCY ANEMIA TYPE: Primary | ICD-10-CM

## 2023-09-08 NOTE — TELEPHONE ENCOUNTER
Spoke with patient and advised of labs placed and recommendation for repeat colonoscopy at his preference.  Patient will call back when he would like to schedule

## 2023-09-10 PROBLEM — Z12.11 COLON CANCER SCREENING: Status: RESOLVED | Noted: 2023-07-12 | Resolved: 2023-09-10

## 2023-10-12 ENCOUNTER — APPOINTMENT (OUTPATIENT)
Dept: LAB | Facility: CLINIC | Age: 73
End: 2023-10-12
Payer: MEDICARE

## 2023-10-12 DIAGNOSIS — D50.9 IRON DEFICIENCY ANEMIA, UNSPECIFIED IRON DEFICIENCY ANEMIA TYPE: ICD-10-CM

## 2023-10-12 LAB
BASOPHILS # BLD AUTO: 0.07 THOUSANDS/ÂΜL (ref 0–0.1)
BASOPHILS NFR BLD AUTO: 1 % (ref 0–1)
EOSINOPHIL # BLD AUTO: 0.2 THOUSAND/ÂΜL (ref 0–0.61)
EOSINOPHIL NFR BLD AUTO: 3 % (ref 0–6)
ERYTHROCYTE [DISTWIDTH] IN BLOOD BY AUTOMATED COUNT: 14.8 % (ref 11.6–15.1)
FERRITIN SERPL-MCNC: 33 NG/ML (ref 24–336)
HCT VFR BLD AUTO: 44.5 % (ref 36.5–49.3)
HGB BLD-MCNC: 14.5 G/DL (ref 12–17)
IMM GRANULOCYTES # BLD AUTO: 0.02 THOUSAND/UL (ref 0–0.2)
IMM GRANULOCYTES NFR BLD AUTO: 0 % (ref 0–2)
IRON SATN MFR SERPL: 43 % (ref 15–50)
IRON SERPL-MCNC: 109 UG/DL (ref 50–212)
LYMPHOCYTES # BLD AUTO: 1.41 THOUSANDS/ÂΜL (ref 0.6–4.47)
LYMPHOCYTES NFR BLD AUTO: 20 % (ref 14–44)
MCH RBC QN AUTO: 30.5 PG (ref 26.8–34.3)
MCHC RBC AUTO-ENTMCNC: 32.6 G/DL (ref 31.4–37.4)
MCV RBC AUTO: 94 FL (ref 82–98)
MONOCYTES # BLD AUTO: 0.49 THOUSAND/ÂΜL (ref 0.17–1.22)
MONOCYTES NFR BLD AUTO: 7 % (ref 4–12)
NEUTROPHILS # BLD AUTO: 4.98 THOUSANDS/ÂΜL (ref 1.85–7.62)
NEUTS SEG NFR BLD AUTO: 69 % (ref 43–75)
NRBC BLD AUTO-RTO: 0 /100 WBCS
PLATELET # BLD AUTO: 319 THOUSANDS/UL (ref 149–390)
PMV BLD AUTO: 10.7 FL (ref 8.9–12.7)
RBC # BLD AUTO: 4.75 MILLION/UL (ref 3.88–5.62)
TIBC SERPL-MCNC: 256 UG/DL (ref 250–450)
UIBC SERPL-MCNC: 147 UG/DL (ref 155–355)
WBC # BLD AUTO: 7.17 THOUSAND/UL (ref 4.31–10.16)

## 2023-10-12 PROCEDURE — 83540 ASSAY OF IRON: CPT

## 2023-10-12 PROCEDURE — 36415 COLL VENOUS BLD VENIPUNCTURE: CPT

## 2023-10-12 PROCEDURE — 85025 COMPLETE CBC W/AUTO DIFF WBC: CPT

## 2023-10-12 PROCEDURE — 83550 IRON BINDING TEST: CPT

## 2023-10-12 PROCEDURE — 82728 ASSAY OF FERRITIN: CPT

## 2023-10-19 DIAGNOSIS — D64.9 ANEMIA, UNSPECIFIED TYPE: ICD-10-CM

## 2023-10-19 RX ORDER — FERROUS SULFATE 324(65)MG
TABLET, DELAYED RELEASE (ENTERIC COATED) ORAL
Qty: 60 TABLET | Refills: 2 | Status: SHIPPED | OUTPATIENT
Start: 2023-10-19

## 2023-10-22 NOTE — ASSESSMENT & PLAN NOTE
· Continue home carbamazepine 400 mg 3 times daily and Keppra 500 mg twice daily  · Recommend continued outpatient follow-up
· Continue home carbamazepine 400 mg 3 times daily and Keppra 500 mg twice daily  · Recommend continued outpatient follow-up
· Continue home levothyroxine 50 mcg daily
· Continue home levothyroxine 50 mcg daily
· Creatinine of 1 53 on admission  · Possible LULA versus CKD  · Last creatinine was 0 94, however, this was from 2019  · This is in the setting of a patient of known renal cell carcinoma  · Creatinine improved to 1 13 with IV fluid   · Outpatient follow-up with PCP
· Home lisinopril held in the setting of LULA and possible GI bleed  · Resume on discharge
· 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 and 2 years of BCG  · Recommend continued outpatient follow-up
· 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 and 2 years of BCG  · Recommend continued outpatient follow-up
· Possible LULA versus CKD  · Last creatinine was 0 94, however, this was from 2019  · This is in the setting of a patient of known renal cell carcinoma  · Begin IV fluid resuscitation  · Follow-up a m  labs
· Possibly acute blood loss anemia due to GI bleed  · As stated above hemoglobin was previously noted to be 15 however this was several years ago  · This is in the setting of a patient with known history of bladder cancer and renal cell carcinoma  · Further management as above
· Possibly acute blood loss anemia due to GI bleed  · As stated above hemoglobin was previously noted to be 15 however this was several years ago  · This is in the setting of a patient with known history of bladder cancer and renal cell carcinoma  · Further management as above
· Presented for evaluation of dark black stools  · Last hemoglobin on record was around 15 however this was several years ago  · H/H: 9 9/31 7  · Hold home aspirin  · Check anemia panel  · Continue to trend H&H every 8 hours  · Begin Protonix 40 mg IV twice daily  · Transfuse for hemoglobin less than 7  · GI consultation requested
· Presented for evaluation of dark black stools  · Last hemoglobin on record was around 15 however this was several years ago  · H/H: 9 9/31 7  · Patient instructed not to resume aleve on discharge  · Anemia panel shows iron deficiency- will start iron supplement on discharge   · GI consultation appreciated  · EGD: Large clean-based ulcer in the pyloric channel with scarring of the pylorus leaving it widely patent likely due to previous peptic ulcer disease  · Biopsies pending     · Protonix 40 mg by mouth twice a day for the next 3 months  · Minimize NSAID use  · Repeat upper endoscopy in 3 months to document healing of the pyloric channel ulcer  · Outpatient follow-up with PCP and GI
· Resume home Crestor
· Status post partial right nephrectomy in 2013  · Recommend continued outpatient follow-up
· Status post partial right nephrectomy in 2013  · Recommend continued outpatient follow-up
· Substitute atorvastatin for home Crestor during admission
· We will hold home lisinopril in the setting of LULA and possible GI bleed  · Monitor blood pressure trends while admitted and restart medications as indicated
Speaking Coherently

## 2023-11-14 ENCOUNTER — TELEPHONE (OUTPATIENT)
Age: 73
End: 2023-11-14

## 2023-11-14 NOTE — TELEPHONE ENCOUNTER
Patient called today in an attempt to schedule in about 1 year (around 1/19/2024) for Anoscopy urethral dilation. Patient also states that he is to have an ultrasound, and labs prior to next office visit. Pt prefers going to the [facility] location for the imaging. Please review chart and contact the patient.     Call back [phone]

## 2023-11-18 ENCOUNTER — HOSPITAL ENCOUNTER (EMERGENCY)
Facility: HOSPITAL | Age: 73
Discharge: HOME | End: 2023-11-18
Attending: EMERGENCY MEDICINE
Payer: MEDICARE

## 2023-11-18 VITALS
OXYGEN SATURATION: 95 % | BODY MASS INDEX: 30.34 KG/M2 | RESPIRATION RATE: 18 BRPM | WEIGHT: 230 LBS | SYSTOLIC BLOOD PRESSURE: 151 MMHG | DIASTOLIC BLOOD PRESSURE: 65 MMHG | TEMPERATURE: 99.2 F | HEART RATE: 106 BPM

## 2023-11-18 DIAGNOSIS — N13.5 URETERAL STRICTURE: ICD-10-CM

## 2023-11-18 DIAGNOSIS — N39.0 URINARY TRACT INFECTION IN ELDERLY PATIENT: ICD-10-CM

## 2023-11-18 DIAGNOSIS — R33.9 URINARY RETENTION: Primary | ICD-10-CM

## 2023-11-18 LAB
ANION GAP SERPL CALCULATED.3IONS-SCNC: 8 MMOL/L
BACTERIA UR QL AUTO: ABNORMAL /HPF
BACTERIA UR QL AUTO: ABNORMAL /HPF
BASOPHILS # BLD AUTO: 0.05 THOUSANDS/ÂΜL (ref 0–0.1)
BASOPHILS NFR BLD AUTO: 0 % (ref 0–1)
BILIRUB UR QL STRIP: NEGATIVE
BILIRUB UR QL STRIP: NEGATIVE
BUN SERPL-MCNC: 27 MG/DL (ref 5–25)
CALCIUM SERPL-MCNC: 9.3 MG/DL (ref 8.4–10.2)
CHLORIDE SERPL-SCNC: 102 MMOL/L (ref 96–108)
CLARITY UR: ABNORMAL
CLARITY UR: ABNORMAL
CO2 SERPL-SCNC: 28 MMOL/L (ref 21–32)
COLOR UR: ABNORMAL
COLOR UR: YELLOW
CREAT SERPL-MCNC: 1.25 MG/DL (ref 0.6–1.3)
EOSINOPHIL # BLD AUTO: 0.01 THOUSAND/ÂΜL (ref 0–0.61)
EOSINOPHIL NFR BLD AUTO: 0 % (ref 0–6)
ERYTHROCYTE [DISTWIDTH] IN BLOOD BY AUTOMATED COUNT: 12.7 % (ref 11.6–15.1)
GFR SERPL CREATININE-BSD FRML MDRD: 56 ML/MIN/1.73SQ M
GLUCOSE SERPL-MCNC: 121 MG/DL (ref 65–140)
GLUCOSE UR STRIP-MCNC: NEGATIVE MG/DL
GLUCOSE UR STRIP-MCNC: NEGATIVE MG/DL
HCT VFR BLD AUTO: 43.7 % (ref 36.5–49.3)
HGB BLD-MCNC: 14.1 G/DL (ref 12–17)
HGB UR QL STRIP.AUTO: ABNORMAL
HGB UR QL STRIP.AUTO: ABNORMAL
IMM GRANULOCYTES # BLD AUTO: 0.03 THOUSAND/UL (ref 0–0.2)
IMM GRANULOCYTES NFR BLD AUTO: 0 % (ref 0–2)
KETONES UR STRIP-MCNC: ABNORMAL MG/DL
KETONES UR STRIP-MCNC: NEGATIVE MG/DL
LEUKOCYTE ESTERASE UR QL STRIP: ABNORMAL
LEUKOCYTE ESTERASE UR QL STRIP: ABNORMAL
LYMPHOCYTES # BLD AUTO: 0.59 THOUSANDS/ÂΜL (ref 0.6–4.47)
LYMPHOCYTES NFR BLD AUTO: 4 % (ref 14–44)
MCH RBC QN AUTO: 30.9 PG (ref 26.8–34.3)
MCHC RBC AUTO-ENTMCNC: 32.3 G/DL (ref 31.4–37.4)
MCV RBC AUTO: 96 FL (ref 82–98)
MONOCYTES # BLD AUTO: 0.84 THOUSAND/ÂΜL (ref 0.17–1.22)
MONOCYTES NFR BLD AUTO: 6 % (ref 4–12)
NEUTROPHILS # BLD AUTO: 13.66 THOUSANDS/ÂΜL (ref 1.85–7.62)
NEUTS SEG NFR BLD AUTO: 90 % (ref 43–75)
NITRITE UR QL STRIP: NEGATIVE
NITRITE UR QL STRIP: POSITIVE
NON-SQ EPI CELLS URNS QL MICRO: ABNORMAL /HPF
NON-SQ EPI CELLS URNS QL MICRO: ABNORMAL /HPF
NRBC BLD AUTO-RTO: 0 /100 WBCS
PH UR STRIP.AUTO: 8 [PH]
PH UR STRIP.AUTO: 8.5 [PH]
PLATELET # BLD AUTO: 261 THOUSANDS/UL (ref 149–390)
PMV BLD AUTO: 10.2 FL (ref 8.9–12.7)
POTASSIUM SERPL-SCNC: 4.4 MMOL/L (ref 3.5–5.3)
PROT UR STRIP-MCNC: ABNORMAL MG/DL
PROT UR STRIP-MCNC: ABNORMAL MG/DL
RBC # BLD AUTO: 4.56 MILLION/UL (ref 3.88–5.62)
RBC #/AREA URNS AUTO: ABNORMAL /HPF
RBC #/AREA URNS AUTO: ABNORMAL /HPF
SODIUM SERPL-SCNC: 138 MMOL/L (ref 135–147)
SP GR UR STRIP.AUTO: 1.01
SP GR UR STRIP.AUTO: 1.01
UROBILINOGEN UR QL STRIP.AUTO: 0.2 E.U./DL
UROBILINOGEN UR QL STRIP.AUTO: 1 E.U./DL
WBC # BLD AUTO: 15.18 THOUSAND/UL (ref 4.31–10.16)
WBC #/AREA URNS AUTO: ABNORMAL /HPF
WBC #/AREA URNS AUTO: ABNORMAL /HPF

## 2023-11-18 PROCEDURE — 80048 BASIC METABOLIC PNL TOTAL CA: CPT | Performed by: NURSE PRACTITIONER

## 2023-11-18 PROCEDURE — 87086 URINE CULTURE/COLONY COUNT: CPT | Performed by: NURSE PRACTITIONER

## 2023-11-18 PROCEDURE — 36415 COLL VENOUS BLD VENIPUNCTURE: CPT | Performed by: NURSE PRACTITIONER

## 2023-11-18 PROCEDURE — 99284 EMERGENCY DEPT VISIT MOD MDM: CPT | Performed by: NURSE PRACTITIONER

## 2023-11-18 PROCEDURE — 96365 THER/PROPH/DIAG IV INF INIT: CPT

## 2023-11-18 PROCEDURE — 85025 COMPLETE CBC W/AUTO DIFF WBC: CPT | Performed by: NURSE PRACTITIONER

## 2023-11-18 PROCEDURE — 99284 EMERGENCY DEPT VISIT MOD MDM: CPT

## 2023-11-18 PROCEDURE — 81003 URINALYSIS AUTO W/O SCOPE: CPT | Performed by: NURSE PRACTITIONER

## 2023-11-18 PROCEDURE — 81001 URINALYSIS AUTO W/SCOPE: CPT | Performed by: NURSE PRACTITIONER

## 2023-11-18 RX ORDER — LIDOCAINE HYDROCHLORIDE 20 MG/ML
1 JELLY TOPICAL ONCE
Status: COMPLETED | OUTPATIENT
Start: 2023-11-18 | End: 2023-11-18

## 2023-11-18 RX ORDER — CEPHALEXIN 500 MG/1
500 CAPSULE ORAL EVERY 8 HOURS SCHEDULED
Qty: 30 CAPSULE | Refills: 0 | Status: SHIPPED | OUTPATIENT
Start: 2023-11-18 | End: 2023-11-28

## 2023-11-18 RX ORDER — CEFTRIAXONE 1 G/50ML
1000 INJECTION, SOLUTION INTRAVENOUS ONCE
Status: COMPLETED | OUTPATIENT
Start: 2023-11-18 | End: 2023-11-18

## 2023-11-18 RX ORDER — CEPHALEXIN 500 MG/1
500 CAPSULE ORAL EVERY 8 HOURS SCHEDULED
Qty: 30 CAPSULE | Refills: 0 | Status: SHIPPED | OUTPATIENT
Start: 2023-11-18 | End: 2023-11-18 | Stop reason: SDUPTHER

## 2023-11-18 RX ADMIN — CEFTRIAXONE 1000 MG: 1 INJECTION, SOLUTION INTRAVENOUS at 17:01

## 2023-11-18 RX ADMIN — LIDOCAINE HYDROCHLORIDE 1 APPLICATION: 20 JELLY TOPICAL at 14:46

## 2023-11-18 NOTE — ED PROVIDER NOTES
History  Chief Complaint   Patient presents with    Difficulty Urinating     Pt states unable to pass urine completely since last night , states no pain or pressure , hx bladder cancer      55-year-old male patient presenting here with his family member with reports of urinary frequency urinary urgency and dysuria which has been progressive over the last several weeks. She reports that at this point he is getting up every few minutes and attempting to urinate. No reported fever or chills. Has a urologic history of BPH bladder diverticulum and urethral stricture. Difficulty Urinating  Presenting symptoms: dysuria    Associated symptoms: urinary frequency    Associated symptoms: no abdominal pain, no fever, no hematuria and no vomiting        Prior to Admission Medications   Prescriptions Last Dose Informant Patient Reported? Taking?    Cyanocobalamin (VITAMIN B-12 PO)  Self Yes No   Sig: Take 1 tablet by mouth daily   Folate-B12-Intrinsic Factor (INTRINSI B12-FOLATE) 800-500-20 MCG-MCG-MG TABS  Self Yes No   Sig: Take by oral route every other day   Omega-3 Fatty Acids (FISH OIL PO)  Self Yes No   Sig: Take by mouth   acetaminophen (TYLENOL) 325 mg tablet  Self Yes No   Sig: Take 650 mg by mouth every 6 (six) hours as needed for mild pain   aspirin (ECOTRIN LOW STRENGTH) 81 mg EC tablet  Self Yes No   Sig: Take 81 mg by mouth daily   carBAMazepine (TEGretol XR) 400 mg 12 hr tablet  Self Yes No   Sig: Take 400 mg by mouth 3 (three) times a day   ferrous sulfate 324 (65 Fe) mg   No No   Sig: Take 1 PO BID before a meal   levETIRAcetam (KEPPRA) 500 mg tablet   Yes No   Sig: Take 500 mg by mouth 2 (two) times a day   levothyroxine 75 mcg tablet  Self Yes No   Sig: Take 50 mcg by mouth daily    lisinopril (ZESTRIL) 5 mg tablet   Yes No   Sig: Take 5 mg by mouth daily   pantoprazole (PROTONIX) 40 mg tablet   No No   Sig: Take 1 tablet (40 mg total) by mouth 2 (two) times a day   polyethylene glycol (Golytely) 4000 mL solution   No No   Sig: Take 4,000 mL by mouth once for 1 dose Take 4000 mL by mouth once for 1 dose. Use as directed   rosuvastatin (CRESTOR) 20 MG tablet  Self Yes No   Sig: Take 20 mg by mouth daily      Facility-Administered Medications: None       Past Medical History:   Diagnosis Date    Arthritis     BPH (benign prostatic hyperplasia)     Full dentures     Gross hematuria     Hearing loss     [place] (hard of hearing)     right ear    Hyperlipidemia     Hypothyroid     Lesion of bladder     Low back pain     Malignant neoplasm of overlapping sites of bladder ([address])     Malignant neoplasm of right kidney, except renal pelvis ([address])     Prediabetes     Renal cyst     Right renal mass     Sciatica     Seizures ([address])     last seizure 14 yrs ago    Wears glasses     reading       Past Surgical History:   Procedure Laterality Date    COLONOSCOPY      CYSTOSCOPY  2012, 2013, 2014    CYSTOSCOPY N/A 12/22/2017    Procedure: INTRAVESICAL MITOMYCIN;  Surgeon: Chase Rios MD;  Location: [facility];  Service: Urology    CYSTOSCOPY W/ RETROGRADES Bilateral 2012    PARTIAL NEPHRECTOMY Right 2013    AL CYSTO BLADDER W/URETERAL CATHETERIZATION Bilateral 11/30/2018    Procedure: Ani Esquivel;  Surgeon: Chase Rios MD;  Location[facility];  Service: Urology    AL CYSTOURETHROSCOPY W/DEST &/RMVL MED BLADDER RUDY N/A 12/22/2017    Procedure: TRANSURETHRAL RESECTION OF BLADDER TUMOR (TURBT); Surgeon: Chase Rios MD;  Location[facility];  Service: Urology    AL CYSTOURETHROSCOPY W/DEST &/RMVL MED BLADDER RUDY N/A 11/30/2018    Procedure: TURBT;  Surgeon: Chase Rios MD;  Location: [facility];  Service: Urology    RENAL CYST EXCISION      questionable malignancy    TRANSURETHRAL RESECTION OF PROSTATE  2012       Family History   Problem Relation Age of Onset    Diabetes Family      I have reviewed and agree with the history as documented.     E-Cigarette/Vaping    E-Cigarette Use Never User E-Cigarette/Vaping Substances     Social History     Tobacco Use    Smoking status: Former     Packs/day: 2.00     Years: 40.00     Total pack years: 80.00     Types: Cigarettes     Quit date: 2014     Years since quittin.9    Smokeless tobacco: Never   Vaping Use    Vaping Use: Never used   Substance Use Topics    Alcohol use: No     Comment: Former drinker. Drug use: No       Review of Systems   Constitutional:  Negative for chills and fever. HENT:  Negative for ear pain and sore throat. Eyes:  Negative for pain and visual disturbance. Respiratory:  Negative for cough and shortness of breath. Cardiovascular:  Negative for chest pain and palpitations. Gastrointestinal:  Negative for abdominal pain and vomiting. Genitourinary:  Positive for dysuria, frequency and urgency. Negative for hematuria. Musculoskeletal:  Negative for arthralgias and back pain. Skin:  Negative for color change and rash. Neurological:  Negative for seizures and syncope. All other systems reviewed and are negative. Physical Exam  Physical Exam  Vitals and nursing note reviewed. Constitutional:       General: He is not in acute distress. Appearance: He is well-developed. HENT:      Head: Normocephalic and atraumatic. Eyes:      General:         Right eye: No discharge. Left eye: No discharge. Conjunctiva/sclera: Conjunctivae normal.   Cardiovascular:      Rate and Rhythm: Normal rate. Pulmonary:      Effort: Pulmonary effort is normal. No respiratory distress. Abdominal:      General: There is no distension. Tenderness: There is abdominal tenderness. There is no guarding. Musculoskeletal:         General: No deformity. Cervical back: Normal range of motion and neck supple. Skin:     General: Skin is warm and dry. Neurological:      Mental Status: He is alert and oriented to person, place, and time.       Coordination: Coordination normal.         Vital Signs  ED Triage Vitals [11/18/23 1407]   Temperature Pulse Respirations Blood Pressure SpO2   99.2 °F (37.3 °C) (!) 117 18 126/80 95 %      Temp src Heart Rate Source Patient Position - Orthostatic VS BP Location FiO2 (%)   -- -- -- -- --      Pain Score       --           Vitals:    11/18/23 1407 11/18/23 1650 11/18/23 1700   BP: 126/80  151/65   Pulse: (!) 117 103 (!) 106         Visual Acuity      ED Medications  Medications   lidocaine (URO-JET) 2 % urethral/mucosal gel 1 Application (1 Application Urethral Given 11/18/23 1446)   cefTRIAXone (ROCEPHIN) IVPB (premix in dextrose) 1,000 mg 50 mL (0 mg Intravenous Stopped 11/18/23 1731)       Diagnostic Studies  Results Reviewed       Procedure Component Value Units Date/Time    Urine culture [[id number]]  (Abnormal) Collected: 11/18/23 1522    Lab Status: Final result Specimen: Urine, Clean Catch Updated: 11/21/23 1418     Urine Culture >100,000 cfu/ml Alpha Hemolytic Streptococcus NOT Enterococcus      40,000-49,000 cfu/ml    Urine culture [[id number]]  (Abnormal) Collected: 11/18/23 1649    Lab Status: Final result Specimen: Urine, Indwelling Miguel Catheter Updated: 11/20/23 1355     Urine Culture >100,000 cfu/ml Alpha Hemolytic Streptococcus NOT Enterococcus    Urine Microscopic [[id number]]  (Abnormal) Collected: 11/18/23 1649    Lab Status: Final result Specimen: Urine, Indwelling Miguel Catheter Updated: 11/18/23 1742     RBC, UA 4-10 /hpf      WBC, UA 10-20 /hpf      Epithelial Cells Occasional /hpf      Bacteria, UA Innumerable /hpf     Basic metabolic panel [[id number]]  (Abnormal) Collected: 11/18/23 1649    Lab Status: Final result Specimen: Blood from Arm, Right Updated: 11/18/23 1723     Sodium 138 mmol/L      Potassium 4.4 mmol/L      Chloride 102 mmol/L      CO2 28 mmol/L      ANION GAP 8 mmol/L      BUN 27 mg/dL      Creatinine 1.25 mg/dL      Glucose 121 mg/dL      Calcium 9.3 mg/dL      eGFR 56 ml/min/1.73sq m     Narrative:      National Kidney Disease Foundation guidelines for Chronic Kidney Disease (CKD):     Stage 1 with normal or high GFR (GFR > 90 mL/min/1.73 square meters)    Stage 2 Mild CKD (GFR = 60-89 mL/min/1.73 square meters)    Stage 3A Moderate CKD (GFR = 45-59 mL/min/1.73 square meters)    Stage 3B Moderate CKD (GFR = 30-44 mL/min/1.73 square meters)    Stage 4 Severe CKD (GFR = 15-29 mL/min/1.73 square meters)    Stage 5 End Stage CKD (GFR <15 mL/min/1.73 square meters)  Note: GFR calculation is accurate only with a steady state creatinine    UA w Reflex to Microscopic w Reflex to Culture [[id number]]  (Abnormal) Collected: 11/18/23 1649    Lab Status: Final result Specimen: Urine, Indwelling Miguel Catheter Updated: 11/18/23 1712     Color, UA Yellow     Clarity, UA Cloudy     Specific Gravity, UA 1.015     pH, UA 8.0     Leukocytes, UA 2+     Nitrite, UA Negative     Protein, UA 1+ mg/dl      Glucose, UA Negative mg/dl      Ketones, UA Negative mg/dl      Urobilinogen, UA 0.2 E.U./dl      Bilirubin, UA Negative     Occult Blood, UA 3+    Urine Microscopic [[id number]]  (Abnormal) Collected: 11/18/23 1522    Lab Status: Final result Specimen: Urine, Clean Catch Updated: 11/18/23 1552     RBC, UA 20-30 /hpf      WBC, UA 20-30 /hpf      Epithelial Cells Moderate /hpf      Bacteria, UA Moderate /hpf     CBC and differential [[id number]]  (Abnormal) Collected: 11/18/23 1522    Lab Status: Final result Specimen: Blood from Arm, Right Updated: 11/18/23 1544     WBC 15.18 Thousand/uL      RBC 4.56 Million/uL      Hemoglobin 14.1 g/dL      Hematocrit 43.7 %      MCV 96 fL      MCH 30.9 pg      MCHC 32.3 g/dL      RDW 12.7 %      MPV 10.2 fL      Platelets 771 Thousands/uL      nRBC 0 /100 WBCs      Neutrophils Relative 90 %      Immat GRANS % 0 %      Lymphocytes Relative 4 %      Monocytes Relative 6 %      Eosinophils Relative 0 %      Basophils Relative 0 %      Neutrophils Absolute 13.66 Thousands/µL      Immature Grans Absolute 0.03 Thousand/uL Lymphocytes Absolute 0.59 Thousands/µL      Monocytes Absolute 0.84 Thousand/µL      Eosinophils Absolute 0.01 Thousand/µL      Basophils Absolute 0.05 Thousands/µL     Narrative: This is an appended report. These results have been appended to a previously verified report. UA w Reflex to Microscopic w Reflex to Culture [[id number]]  (Abnormal) Collected: 11/18/23 1522    Lab Status: Final result Specimen: Urine, Clean Catch Updated: 11/18/23 1531     Color, UA Red     Clarity, UA Cloudy     Specific Gravity, UA 1.015     pH, UA 8.5     Leukocytes, UA 2+     Nitrite, UA Positive     Protein, UA 3+ mg/dl      Glucose, UA Negative mg/dl      Ketones, UA Trace mg/dl      Urobilinogen, UA 1.0 E.U./dl      Bilirubin, UA Negative     Occult Blood, UA 3+                   No orders to display              Procedures  Procedures         ED Course  ED Course as of 11/23/23 0629   Sat Nov 18, 2023   1541 Several attempts were attempted to pass urinary catheter using 16 French final rigid catheter as well as attempting coudé catheter. After approximately 4 times were unsuccessful and the case was escalated to urology on-call                               SBIRT 20yo+      Flowsheet Row Most Recent Value   Initial Alcohol Screen: US AUDIT-C     1. How often do you have a drink containing alcohol? 0 Filed at: 11/18/2023 1406   Audit-C Score 0 Filed at: 11/18/2023 1406                      Medical Decision Making  Patient was successfully catheterized by urology after dilatation. Provided a dose of IV antibiotics and prescription for antibiotic therapy at home. Amount and/or Complexity of Data Reviewed  Labs: ordered. Risk  Prescription drug management.              Disposition  Final diagnoses:   Urinary retention   Ureteral stricture   Urinary tract infection in elderly patient     Time reflects when diagnosis was documented in both MDM as applicable and the Disposition within this note       Time User Action Codes Description Comment    11/18/2023  3:39 PM Duana Calender Add [R33.9] Urinary retention     11/18/2023  4:30 PM Duana Calender Add [N13.5] Ureteral stricture     11/18/2023  4:31 PM Duana Calender Add [N39.0] Urinary tract infection in elderly patient           ED Disposition       ED Disposition   Discharge    Condition   Stable    Date/Time   Sat Nov 18, 2023 [address] discharge to home/self care.                    Follow-up Information       Follow up With Specialties Details Why Contact Info    Kiya Pool MD Urology Schedule an appointment as soon as possible for a visit  For Continued Evaluation [address] [facility]  [phone]              Discharge Medication List as of 11/18/2023  4:32 PM        START taking these medications    Details   cephalexin (KEFLEX) 500 mg capsule Take 1 capsule (500 mg total) by mouth every 8 (eight) hours for 10 days, Starting Sat 11/18/2023, Until Tue 11/28/2023, Normal           CONTINUE these medications which have NOT CHANGED    Details   acetaminophen (TYLENOL) 325 mg tablet Take 650 mg by mouth every 6 (six) hours as needed for mild pain, Historical Med      aspirin (ECOTRIN LOW STRENGTH) 81 mg EC tablet Take 81 mg by mouth daily, Historical Med      carBAMazepine (TEGretol XR) 400 mg 12 hr tablet Take 400 mg by mouth 3 (three) times a day, Historical Med      Cyanocobalamin (VITAMIN B-12 PO) Take 1 tablet by mouth daily, Historical Med      ferrous sulfate 324 (65 Fe) mg Take 1 PO BID before a meal, Normal      Folate-B12-Intrinsic Factor (INTRINSI D27-HTHIGI) 244-477-53 MCG-MCG-MG TABS Take by oral route every other day, Historical Med      levETIRAcetam (KEPPRA) 500 mg tablet Take 500 mg by mouth 2 (two) times a day, Historical Med      levothyroxine 75 mcg tablet Take 50 mcg by mouth daily , Historical Med      lisinopril (ZESTRIL) 5 mg tablet Take 5 mg by mouth daily, Historical Med      Omega-3 Fatty Acids (FISH OIL PO) Take by mouth, Historical Med      pantoprazole (PROTONIX) 40 mg tablet Take 1 tablet (40 mg total) by mouth 2 (two) times a day, Starting Tue 7/11/2023, Until Mon 10/9/2023, Normal      polyethylene glycol (Golytely) 4000 mL solution Take 4,000 mL by mouth once for 1 dose Take 4000 mL by mouth once for 1 dose. Use as directed, Starting Tue 7/11/2023, Normal      rosuvastatin (CRESTOR) 20 MG tablet Take 20 mg by mouth daily, Historical Med             No discharge procedures on file.     PDMP Review       None            ED Provider  Electronically Signed by             Vel Goode [address] [place] [address]  11/23/23 7283

## 2023-11-18 NOTE — CONSULTS
Urinary retention, urethral stricture, UTI. Remote history of bladder carcinoma consultation - Urology   Micah Paige 68 y.o. male MRN: [id number]  Unit/Bed#: ED 17 Encounter: [id number]      Assessment/Plan      Assessment:  Urinary retention, urethral stricture, UTI. Remote history of bladder carcinoma. Established patient of Dr. Steve Mason at [facility] for urology. Plan:  Urethral stricture was dilated easily up to 18 [place] and a 16 Czech Miguel catheter was placed. Urine culture will be sent. He will receive a dose of intravenous antibiotics here and then a 7-day course of oral antibiotics. He expresses a desire to follow-up with his regular urologist at [facility] for urology and I recommended that he call on Monday for follow-up appointment and management of the Miguel catheter and voiding trial.    History of Present Illness   Attending: Jordin Tarango I*  Reason for Consult / Principal Problem: Urinary retention. Inability to pass a Miguel catheter. HPI: Micah Paige is a 68y.o. year old male who presents with inability to void since last night. He is an established patient of Dr. Steve Mason at [facility] for urology with a remote history of bladder carcinoma. He also has a history of bulbar urethral stricture which was last dilated in January. The patient states he had no change in voiding pattern until yesterday when he could not void. He denied any fever or chills. Urinalysis today is consistent with UTI. Bladder scan postvoid residual was 450 mL. The emergency department staff was unable to place a Miguel catheter due to obstruction of the posterior urethra likely secondary to his known stricture. I was able to easily place a 4 Czech spiral tip filiform and then dilate with followers from 10-18 [place] with minimal resistance. A 16 Czech Miguel was then easily placed.     Consult to Urology  Consult performed by: Sabrina Miranda MD  Consult ordered by: LEEANN Reis          Review of Systems   Genitourinary:  Positive for difficulty urinating. Negative for dysuria, flank pain and hematuria. Historical Information   Past Medical History:   Diagnosis Date    Arthritis     BPH (benign prostatic hyperplasia)     Full dentures     Gross hematuria     Hearing loss     [place] (hard of hearing)     right ear    Hyperlipidemia     Hypothyroid     Lesion of bladder     Low back pain     Malignant neoplasm of overlapping sites of bladder [facility])     Malignant neoplasm of right kidney, except renal pelvis (HCC)     Prediabetes     Renal cyst     Right renal mass     Sciatica     Seizures ([address])     last seizure 14 yrs ago    Wears glasses     reading     Past Surgical History:   Procedure Laterality Date    COLONOSCOPY      CYSTOSCOPY  2012, 2013, 2014    CYSTOSCOPY N/A 12/22/2017    Procedure: INTRAVESICAL MITOMYCIN;  Surgeon: Kanu Aguirre MD;  [facility];  Service: Urology    CYSTOSCOPY W/ RETROGRADES Bilateral 2012    PARTIAL NEPHRECTOMY Right 2013    MI CYSTO BLADDER W/URETERAL CATHETERIZATION Bilateral 11/30/2018    Procedure: Verdene Cram;  Surgeon: Kanu Aguirre MD;  [facility];  Service: Urology    MI CYSTOURETHROSCOPY W/DEST &/RMVL MED BLADDER RUDY N/A 12/22/2017    Procedure: TRANSURETHRAL RESECTION OF BLADDER TUMOR (TURBT); Surgeon: Kanu Aguirre MD;  [facility];  Service: Urology    MI CYSTOURETHROSCOPY W/DEST &/RMVL MED BLADDER RUDY N/A 11/30/2018    Procedure: TURBT;  Surgeon: Kanu Aguirre MD;  [facility];  Service: Urology    RENAL CYST EXCISION      questionable malignancy    TRANSURETHRAL RESECTION OF PROSTATE  2012     Social History   Social History     Substance and Sexual Activity   Alcohol Use No    Comment: Former drinker.      @[email or web address]  E-Cigarette/Vaping    E-Cigarette Use Never User      E-Cigarette/Vaping Substances     Social History     Tobacco Use   Smoking Status Former    Packs/day: 2.00    Years: 40.00    Total pack years: 80.00    Types: Cigarettes    Quit date: 2014    Years since quittin.9   Smokeless Tobacco Never     Family History: non-contributory    Meds/Allergies   current meds:   Current Facility-Administered Medications   Medication Dose Route Frequency    cefTRIAXone (ROCEPHIN) IVPB (premix in dextrose) 1,000 mg 50 mL  1,000 mg Intravenous Once     Allergies   Allergen Reactions    Bactrim [Sulfamethoxazole-Trimethoprim] Itching, Hives and Rash     Rash, itching    Atorvastatin Drowsiness       Objective   Vitals: Blood pressure 126/80, pulse (!) 117, temperature 99.2 °F (37.3 °C), resp. rate 18, weight 104 kg (230 lb), SpO2 95 %. No intake/output data recorded. Invasive Devices       Peripheral Intravenous Line  Duration             Peripheral IV 23 Distal;Right;Upper;Ventral (anterior) Arm <1 day              Drain  Duration             Urethral Catheter 20 Fr. 2157 days    Urethral Catheter Latex; Double-lumen 22 Fr. 1814 days                    Physical Exam  Abdominal:      Palpations: Abdomen is soft. Tenderness: There is no abdominal tenderness. There is no right CVA tenderness or left CVA tenderness.    Genitourinary:     Penis: Normal.       Testes: Normal.         Lab Results: CBC:   Lab Results   Component Value Date    WBC 15.18 (H) 2023    HGB 14.1 2023    HCT 43.7 2023    MCV 96 2023     2023    RBC 4.56 2023    MCH 30.9 2023    MCHC 32.3 2023    RDW 12.7 2023    MPV 10.2 2023    NRBC 0 2023     CMP: No results found for: "SODIUM", "CL", "CO2", "ANIONGAP", "BUN", "CREATININE", "GLUCOSE", "CALCIUM", "AST", "ALT", "ALKPHOS", "PROT", "BILITOT", "EGFR"  Urinalysis:   Lab Results   Component Value Date    COLORU Red (A) 2023    CLARITYU Cloudy (A) 2023    SPECGRAV 1.015 2023    PHUR 8.5 (A) 2023    LEUKOCYTESUR 2+ (A) 2023 NITRITE Positive (A) 11/18/2023    GLUCOSEU Negative 11/18/2023    KETONESU Trace (A) 11/18/2023    BILIRUBINUR Negative 11/18/2023    BLOODU 3+ (A) 11/18/2023     Urine Culture: No results found for: "URINECX"  Imaging Studies: I have personally reviewed pertinent reports. EKG, Pathology, and Other Studies: I have personally reviewed pertinent reports. VTE Prophylaxis: Sequential compression device [facility] [place])     Code Status: Prior  Advance Directive and Living Will:      Power of :    POLST:      Counseling / Coordination of Care  Total floor / unit time spent today 45 minutes. Greater than 50% of total time was spent with the patient and / or family counseling and / or coordination of care. A description of the counseling / coordination of care: I discussed the case with the emergency department staff.

## 2023-11-20 LAB — BACTERIA UR CULT: ABNORMAL

## 2023-11-21 LAB
BACTERIA UR CULT: ABNORMAL
BACTERIA UR CULT: ABNORMAL

## 2023-12-28 ENCOUNTER — APPOINTMENT (OUTPATIENT)
Dept: LAB | Facility: CLINIC | Age: 73
End: 2023-12-28
Payer: MEDICARE

## 2023-12-28 DIAGNOSIS — C67.9 MALIGNANT NEOPLASM OF URINARY BLADDER, UNSPECIFIED SITE (HCC): ICD-10-CM

## 2023-12-28 LAB
ALBUMIN SERPL BCP-MCNC: 4.3 G/DL (ref 3.5–5)
ALP SERPL-CCNC: 97 U/L (ref 34–104)
ALT SERPL W P-5'-P-CCNC: 9 U/L (ref 7–52)
ANION GAP SERPL CALCULATED.3IONS-SCNC: 11 MMOL/L
AST SERPL W P-5'-P-CCNC: 10 U/L (ref 13–39)
BILIRUB SERPL-MCNC: 0.38 MG/DL (ref 0.2–1)
BUN SERPL-MCNC: 18 MG/DL (ref 5–25)
CALCIUM SERPL-MCNC: 9.6 MG/DL (ref 8.4–10.2)
CHLORIDE SERPL-SCNC: 101 MMOL/L (ref 96–108)
CO2 SERPL-SCNC: 28 MMOL/L (ref 21–32)
CREAT SERPL-MCNC: 0.99 MG/DL (ref 0.6–1.3)
ERYTHROCYTE [DISTWIDTH] IN BLOOD BY AUTOMATED COUNT: 13.1 % (ref 11.6–15.1)
GFR SERPL CREATININE-BSD FRML MDRD: 75 ML/MIN/1.73SQ M
GLUCOSE P FAST SERPL-MCNC: 88 MG/DL (ref 65–99)
HCT VFR BLD AUTO: 44.4 % (ref 36.5–49.3)
HGB BLD-MCNC: 14.2 G/DL (ref 12–17)
MCH RBC QN AUTO: 31.1 PG (ref 26.8–34.3)
MCHC RBC AUTO-ENTMCNC: 32 G/DL (ref 31.4–37.4)
MCV RBC AUTO: 97 FL (ref 82–98)
PLATELET # BLD AUTO: 316 THOUSANDS/UL (ref 149–390)
PMV BLD AUTO: 10.1 FL (ref 8.9–12.7)
POTASSIUM SERPL-SCNC: 4.7 MMOL/L (ref 3.5–5.3)
PROT SERPL-MCNC: 7.3 G/DL (ref 6.4–8.4)
RBC # BLD AUTO: 4.57 MILLION/UL (ref 3.88–5.62)
SODIUM SERPL-SCNC: 140 MMOL/L (ref 135–147)
WBC # BLD AUTO: 11 THOUSAND/UL (ref 4.31–10.16)

## 2023-12-28 PROCEDURE — 85027 COMPLETE CBC AUTOMATED: CPT

## 2023-12-28 PROCEDURE — 87086 URINE CULTURE/COLONY COUNT: CPT | Performed by: UROLOGY

## 2023-12-28 PROCEDURE — 36415 COLL VENOUS BLD VENIPUNCTURE: CPT

## 2023-12-28 PROCEDURE — 80053 COMPREHEN METABOLIC PANEL: CPT

## 2023-12-29 ENCOUNTER — APPOINTMENT (EMERGENCY)
Dept: CT IMAGING | Facility: HOSPITAL | Age: 73
DRG: 871 | End: 2023-12-29
Payer: MEDICARE

## 2023-12-29 ENCOUNTER — APPOINTMENT (EMERGENCY)
Dept: RADIOLOGY | Facility: HOSPITAL | Age: 73
DRG: 871 | End: 2023-12-29
Payer: MEDICARE

## 2023-12-29 ENCOUNTER — LAB REQUISITION (OUTPATIENT)
Dept: LAB | Facility: HOSPITAL | Age: 73
End: 2023-12-29
Payer: MEDICARE

## 2023-12-29 ENCOUNTER — HOSPITAL ENCOUNTER (INPATIENT)
Facility: HOSPITAL | Age: 73
LOS: 2 days | Discharge: HOME | DRG: 871 | End: 2023-12-31
Attending: EMERGENCY MEDICINE | Admitting: INTERNAL MEDICINE
Payer: MEDICARE

## 2023-12-29 ENCOUNTER — APPOINTMENT (EMERGENCY)
Dept: NON INVASIVE DIAGNOSTICS | Facility: HOSPITAL | Age: 73
DRG: 871 | End: 2023-12-29
Payer: MEDICARE

## 2023-12-29 DIAGNOSIS — J18.9 PNEUMONIA: Primary | ICD-10-CM

## 2023-12-29 DIAGNOSIS — A41.9 SEPSIS (HCC): ICD-10-CM

## 2023-12-29 DIAGNOSIS — I63.9 STROKE (CEREBRUM) (HCC): ICD-10-CM

## 2023-12-29 DIAGNOSIS — N39.0 URINARY TRACT INFECTION, SITE NOT SPECIFIED: ICD-10-CM

## 2023-12-29 PROBLEM — U07.1 COVID-19: Status: ACTIVE | Noted: 2023-12-29

## 2023-12-29 LAB
2HR DELTA HS TROPONIN: 0 NG/L
ABO GROUP BLD: NORMAL
ANION GAP SERPL CALCULATED.3IONS-SCNC: 10 MMOL/L
AORTIC ROOT: 4.1 CM
APICAL FOUR CHAMBER EJECTION FRACTION: 79 %
AV LVOT MEAN GRADIENT: 2 MMHG
AV LVOT PEAK GRADIENT: 3 MMHG
BACTERIA UR QL AUTO: ABNORMAL /HPF
BASOPHILS # BLD MANUAL: 0 THOUSAND/UL (ref 0–0.1)
BASOPHILS NFR MAR MANUAL: 0 % (ref 0–1)
BILIRUB UR QL STRIP: NEGATIVE
BLD GP AB SCN SERPL QL: NEGATIVE
BUN SERPL-MCNC: 21 MG/DL (ref 5–25)
CALCIUM SERPL-MCNC: 8.7 MG/DL (ref 8.4–10.2)
CARDIAC TROPONIN I PNL SERPL HS: 5 NG/L
CARDIAC TROPONIN I PNL SERPL HS: 5 NG/L
CHLORIDE SERPL-SCNC: 101 MMOL/L (ref 96–108)
CLARITY UR: CLEAR
CO2 SERPL-SCNC: 26 MMOL/L (ref 21–32)
COLOR UR: YELLOW
CREAT SERPL-MCNC: 1.4 MG/DL (ref 0.6–1.3)
DOP CALC LVOT PEAK VEL VTI: 18.87 CM
DOP CALC LVOT PEAK VEL: 0.89 M/S
E WAVE DECELERATION TIME: 131 MS
E/A RATIO: 0.99
EOSINOPHIL # BLD MANUAL: 0 THOUSAND/UL (ref 0–0.4)
EOSINOPHIL NFR BLD MANUAL: 0 % (ref 0–6)
ERYTHROCYTE [DISTWIDTH] IN BLOOD BY AUTOMATED COUNT: 13.1 % (ref 11.6–15.1)
FLUAV RNA RESP QL NAA+PROBE: NEGATIVE
FLUBV RNA RESP QL NAA+PROBE: NEGATIVE
FRACTIONAL SHORTENING: 38 (ref 28–44)
GFR SERPL CREATININE-BSD FRML MDRD: 49 ML/MIN/1.73SQ M
GLUCOSE SERPL-MCNC: 130 MG/DL (ref 65–140)
GLUCOSE UR STRIP-MCNC: NEGATIVE MG/DL
HCT VFR BLD AUTO: 39.7 % (ref 36.5–49.3)
HGB BLD-MCNC: 12.6 G/DL (ref 12–17)
HGB UR QL STRIP.AUTO: ABNORMAL
INTERVENTRICULAR SEPTUM IN DIASTOLE (PARASTERNAL SHORT AXIS VIEW): 0.9 CM
INTERVENTRICULAR SEPTUM: 0.9 CM (ref 0.6–1.1)
KETONES UR STRIP-MCNC: NEGATIVE MG/DL
LAAS-AP2: 13.3 CM2
LAAS-AP4: 16.3 CM2
LACTATE SERPL-SCNC: 1.3 MMOL/L (ref 0.5–2)
LACTATE SERPL-SCNC: 2.5 MMOL/L (ref 0.5–2)
LEFT ATRIUM SIZE: 3.6 CM
LEFT ATRIUM VOLUME (MOD BIPLANE): 35 ML
LEFT ATRIUM VOLUME INDEX (MOD BIPLANE): 15.4 ML/M2
LEFT INTERNAL DIMENSION IN SYSTOLE: 2.6 CM (ref 2.1–4)
LEFT VENTRICULAR INTERNAL DIMENSION IN DIASTOLE: 4.2 CM (ref 3.5–6)
LEFT VENTRICULAR POSTERIOR WALL IN END DIASTOLE: 0.9 CM
LEFT VENTRICULAR STROKE VOLUME: 54 ML
LEUKOCYTE ESTERASE UR QL STRIP: NEGATIVE
LVSV (TEICH): 54 ML
LYMPHOCYTES # BLD AUTO: 1.08 THOUSAND/UL (ref 0.6–4.47)
LYMPHOCYTES # BLD AUTO: 6 % (ref 14–44)
MCH RBC QN AUTO: 31.3 PG (ref 26.8–34.3)
MCHC RBC AUTO-ENTMCNC: 31.7 G/DL (ref 31.4–37.4)
MCV RBC AUTO: 99 FL (ref 82–98)
MONOCYTES # BLD AUTO: 1.08 THOUSAND/UL (ref 0–1.22)
MONOCYTES NFR BLD: 10 % (ref 4–12)
MV E'TISSUE VEL-SEP: 8 CM/S
MV PEAK A VEL: 0.87 M/S
MV PEAK E VEL: 86 CM/S
MV STENOSIS PRESSURE HALF TIME: 38 MS
MV VALVE AREA P 1/2 METHOD: 5.8
NEUTROPHILS # BLD MANUAL: 8.67 THOUSAND/UL (ref 1.85–7.62)
NEUTS BAND NFR BLD MANUAL: 13 % (ref 0–8)
NEUTS SEG NFR BLD AUTO: 67 % (ref 43–75)
NITRITE UR QL STRIP: NEGATIVE
NON-SQ EPI CELLS URNS QL MICRO: ABNORMAL /HPF
PH UR STRIP.AUTO: 6.5 [PH]
PLATELET # BLD AUTO: 231 THOUSANDS/UL (ref 149–390)
PLATELET BLD QL SMEAR: ADEQUATE
PMV BLD AUTO: 10.1 FL (ref 8.9–12.7)
POTASSIUM SERPL-SCNC: 4.2 MMOL/L (ref 3.5–5.3)
PROCALCITONIN SERPL-MCNC: 0.31 NG/ML
PROT UR STRIP-MCNC: ABNORMAL MG/DL
RA PRESSURE ESTIMATED: 10 MMHG
RBC # BLD AUTO: 4.02 MILLION/UL (ref 3.88–5.62)
RBC #/AREA URNS AUTO: ABNORMAL /HPF
RBC MORPH BLD: NORMAL
RH BLD: NEGATIVE
RIGHT ATRIUM AREA SYSTOLE A4C: 16 CM2
RIGHT VENTRICLE ID DIMENSION: 3.4 CM
RSV RNA RESP QL NAA+PROBE: NEGATIVE
RV PSP: 53 MMHG
SARS-COV-2 RNA RESP QL NAA+PROBE: POSITIVE
SL CV LEFT ATRIUM LENGTH A2C: 4.7 CM
SL CV LV EF: 60
SL CV PED ECHO LEFT VENTRICLE DIASTOLIC VOLUME (MOD BIPLANE) 2D: 79 ML
SL CV PED ECHO LEFT VENTRICLE SYSTOLIC VOLUME (MOD BIPLANE) 2D: 25 ML
SODIUM SERPL-SCNC: 137 MMOL/L (ref 135–147)
SP GR UR STRIP.AUTO: <=1.005
SPECIMEN EXPIRATION DATE: NORMAL
TR MAX PG: 43 MMHG
TR PEAK VELOCITY: 3.3 M/S
TRICUSPID ANNULAR PLANE SYSTOLIC EXCURSION: 2 CM
TRICUSPID VALVE PEAK REGURGITATION VELOCITY: 3.29 M/S
UROBILINOGEN UR QL STRIP.AUTO: 0.2 E.U./DL
VARIANT LYMPHS # BLD AUTO: 4 %
WBC # BLD AUTO: 10.84 THOUSAND/UL (ref 4.31–10.16)
WBC #/AREA URNS AUTO: ABNORMAL /HPF

## 2023-12-29 PROCEDURE — 71260 CT THORAX DX C+: CPT

## 2023-12-29 PROCEDURE — 72125 CT NECK SPINE W/O DYE: CPT

## 2023-12-29 PROCEDURE — 93306 TTE W/DOPPLER COMPLETE: CPT | Performed by: INTERNAL MEDICINE

## 2023-12-29 PROCEDURE — 84145 PROCALCITONIN (PCT): CPT | Performed by: EMERGENCY MEDICINE

## 2023-12-29 PROCEDURE — 80048 BASIC METABOLIC PNL TOTAL CA: CPT | Performed by: EMERGENCY MEDICINE

## 2023-12-29 PROCEDURE — 99285 EMERGENCY DEPT VISIT HI MDM: CPT

## 2023-12-29 PROCEDURE — 76705 ECHO EXAM OF ABDOMEN: CPT | Performed by: EMERGENCY MEDICINE

## 2023-12-29 PROCEDURE — 86901 BLOOD TYPING SEROLOGIC RH(D): CPT | Performed by: EMERGENCY MEDICINE

## 2023-12-29 PROCEDURE — 86850 RBC ANTIBODY SCREEN: CPT | Performed by: EMERGENCY MEDICINE

## 2023-12-29 PROCEDURE — 74177 CT ABD & PELVIS W/CONTRAST: CPT

## 2023-12-29 PROCEDURE — 93005 ELECTROCARDIOGRAM TRACING: CPT

## 2023-12-29 PROCEDURE — 99291 CRITICAL CARE FIRST HOUR: CPT | Performed by: EMERGENCY MEDICINE

## 2023-12-29 PROCEDURE — 96361 HYDRATE IV INFUSION ADD-ON: CPT

## 2023-12-29 PROCEDURE — 0241U HB NFCT DS VIR RESP RNA 4 TRGT: CPT | Performed by: EMERGENCY MEDICINE

## 2023-12-29 PROCEDURE — 36415 COLL VENOUS BLD VENIPUNCTURE: CPT | Performed by: EMERGENCY MEDICINE

## 2023-12-29 PROCEDURE — 99223 1ST HOSP IP/OBS HIGH 75: CPT | Performed by: INTERNAL MEDICINE

## 2023-12-29 PROCEDURE — 87040 BLOOD CULTURE FOR BACTERIA: CPT | Performed by: EMERGENCY MEDICINE

## 2023-12-29 PROCEDURE — 93306 TTE W/DOPPLER COMPLETE: CPT

## 2023-12-29 PROCEDURE — 83605 ASSAY OF LACTIC ACID: CPT | Performed by: EMERGENCY MEDICINE

## 2023-12-29 PROCEDURE — 71045 X-RAY EXAM CHEST 1 VIEW: CPT

## 2023-12-29 PROCEDURE — 93308 TTE F-UP OR LMTD: CPT | Performed by: EMERGENCY MEDICINE

## 2023-12-29 PROCEDURE — 85027 COMPLETE CBC AUTOMATED: CPT | Performed by: EMERGENCY MEDICINE

## 2023-12-29 PROCEDURE — 96365 THER/PROPH/DIAG IV INF INIT: CPT

## 2023-12-29 PROCEDURE — 70450 CT HEAD/BRAIN W/O DYE: CPT

## 2023-12-29 PROCEDURE — 84484 ASSAY OF TROPONIN QUANT: CPT | Performed by: EMERGENCY MEDICINE

## 2023-12-29 PROCEDURE — 85007 BL SMEAR W/DIFF WBC COUNT: CPT | Performed by: EMERGENCY MEDICINE

## 2023-12-29 PROCEDURE — 86900 BLOOD TYPING SEROLOGIC ABO: CPT | Performed by: EMERGENCY MEDICINE

## 2023-12-29 PROCEDURE — 81001 URINALYSIS AUTO W/SCOPE: CPT | Performed by: EMERGENCY MEDICINE

## 2023-12-29 RX ORDER — CEFTRIAXONE 2 G/50ML
2000 INJECTION, SOLUTION INTRAVENOUS ONCE
Status: COMPLETED | OUTPATIENT
Start: 2023-12-29 | End: 2023-12-29

## 2023-12-29 RX ORDER — ATORVASTATIN CALCIUM 40 MG/1
40 TABLET, FILM COATED ORAL
Status: DISCONTINUED | OUTPATIENT
Start: 2023-12-29 | End: 2023-12-31 | Stop reason: HOSPADM

## 2023-12-29 RX ORDER — PANTOPRAZOLE SODIUM 40 MG/1
40 TABLET, DELAYED RELEASE ORAL 2 TIMES DAILY
Status: DISCONTINUED | OUTPATIENT
Start: 2023-12-29 | End: 2023-12-31 | Stop reason: HOSPADM

## 2023-12-29 RX ORDER — ONDANSETRON 2 MG/ML
4 INJECTION INTRAMUSCULAR; INTRAVENOUS EVERY 6 HOURS PRN
Status: DISCONTINUED | OUTPATIENT
Start: 2023-12-29 | End: 2023-12-31 | Stop reason: HOSPADM

## 2023-12-29 RX ORDER — HEPARIN SODIUM 5000 [USP'U]/ML
5000 INJECTION, SOLUTION INTRAVENOUS; SUBCUTANEOUS EVERY 8 HOURS SCHEDULED
Status: DISCONTINUED | OUTPATIENT
Start: 2023-12-29 | End: 2023-12-31 | Stop reason: HOSPADM

## 2023-12-29 RX ORDER — CARBAMAZEPINE 200 MG/1
400 TABLET, EXTENDED RELEASE ORAL 3 TIMES DAILY
Status: DISCONTINUED | OUTPATIENT
Start: 2023-12-29 | End: 2023-12-31 | Stop reason: HOSPADM

## 2023-12-29 RX ORDER — ACETAMINOPHEN 325 MG/1
650 TABLET ORAL EVERY 6 HOURS PRN
Status: DISCONTINUED | OUTPATIENT
Start: 2023-12-29 | End: 2023-12-31 | Stop reason: HOSPADM

## 2023-12-29 RX ORDER — ACETAMINOPHEN 325 MG/1
650 TABLET ORAL EVERY 6 HOURS PRN
Status: DISCONTINUED | OUTPATIENT
Start: 2023-12-29 | End: 2023-12-29

## 2023-12-29 RX ORDER — CEFEPIME HYDROCHLORIDE 2 G/50ML
2000 INJECTION, SOLUTION INTRAVENOUS EVERY 12 HOURS
Status: DISCONTINUED | OUTPATIENT
Start: 2023-12-29 | End: 2023-12-31 | Stop reason: HOSPADM

## 2023-12-29 RX ORDER — DEXAMETHASONE SODIUM PHOSPHATE 10 MG/ML
6 INJECTION, SOLUTION INTRAMUSCULAR; INTRAVENOUS EVERY 24 HOURS
Status: DISCONTINUED | OUTPATIENT
Start: 2023-12-29 | End: 2023-12-31 | Stop reason: HOSPADM

## 2023-12-29 RX ORDER — LEVOTHYROXINE SODIUM 0.05 MG/1
50 TABLET ORAL DAILY
Status: DISCONTINUED | OUTPATIENT
Start: 2023-12-30 | End: 2023-12-31 | Stop reason: HOSPADM

## 2023-12-29 RX ORDER — ACETAMINOPHEN 160 MG/5ML
2 SUSPENSION, ORAL (FINAL DOSE FORM) ORAL ONCE
Status: COMPLETED | OUTPATIENT
Start: 2023-12-29 | End: 2023-12-29

## 2023-12-29 RX ORDER — LEVETIRACETAM 500 MG/1
500 TABLET ORAL 2 TIMES DAILY
Status: DISCONTINUED | OUTPATIENT
Start: 2023-12-29 | End: 2023-12-31 | Stop reason: HOSPADM

## 2023-12-29 RX ORDER — DOXYCYCLINE HYCLATE 100 MG/1
100 CAPSULE ORAL EVERY 12 HOURS SCHEDULED
Status: DISCONTINUED | OUTPATIENT
Start: 2023-12-29 | End: 2023-12-29

## 2023-12-29 RX ORDER — SODIUM CHLORIDE 9 MG/ML
100 INJECTION, SOLUTION INTRAVENOUS CONTINUOUS
Status: DISCONTINUED | OUTPATIENT
Start: 2023-12-29 | End: 2023-12-30

## 2023-12-29 RX ADMIN — REMDESIVIR 200 MG: 100 INJECTION, POWDER, LYOPHILIZED, FOR SOLUTION INTRAVENOUS at 22:00

## 2023-12-29 RX ADMIN — HEPARIN SODIUM 5000 UNITS: 5000 INJECTION INTRAVENOUS; SUBCUTANEOUS at 22:00

## 2023-12-29 RX ADMIN — SODIUM CHLORIDE 1000 ML: 0.9 INJECTION, SOLUTION INTRAVENOUS at 16:04

## 2023-12-29 RX ADMIN — CARBAMAZEPINE 400 MG: 200 TABLET, EXTENDED RELEASE ORAL at 21:59

## 2023-12-29 RX ADMIN — ATORVASTATIN CALCIUM 40 MG: 40 TABLET, FILM COATED ORAL at 19:55

## 2023-12-29 RX ADMIN — CEFTRIAXONE 2000 MG: 2 INJECTION, SOLUTION INTRAVENOUS at 15:07

## 2023-12-29 RX ADMIN — LEVETIRACETAM 500 MG: 500 TABLET, FILM COATED ORAL at 19:55

## 2023-12-29 RX ADMIN — CEFEPIME HYDROCHLORIDE 2000 MG: 2 INJECTION, SOLUTION INTRAVENOUS at 19:55

## 2023-12-29 RX ADMIN — PANTOPRAZOLE SODIUM 40 MG: 40 TABLET, DELAYED RELEASE ORAL at 21:59

## 2023-12-29 RX ADMIN — ACETAMINOPHEN 650 MG: 325 TABLET ORAL at 21:59

## 2023-12-29 RX ADMIN — DEXAMETHASONE SODIUM PHOSPHATE 6 MG: 10 INJECTION, SOLUTION INTRAMUSCULAR; INTRAVENOUS at 19:55

## 2023-12-29 RX ADMIN — SODIUM CHLORIDE 100 ML/HR: 0.9 INJECTION, SOLUTION INTRAVENOUS at 20:03

## 2023-12-29 RX ADMIN — SODIUM CHLORIDE 1000 ML: 0.9 INJECTION, SOLUTION INTRAVENOUS at 16:02

## 2023-12-29 RX ADMIN — IOHEXOL 100 ML: 350 INJECTION, SOLUTION INTRAVENOUS at 14:35

## 2023-12-29 RX ADMIN — BARICITINIB 2 MG: 2 TABLET, FILM COATED ORAL at 21:59

## 2023-12-29 NOTE — H&P
[facility]  H&P  Name: Jose Roberto Herring 73 y.o. male I MRN: [id number]  Unit/Bed#: ED 20 I Date of Admission: 12/29/2023   Date of Service: 12/29/2023 I Hospital Day: 0      Assessment/Plan   COVID-19  Assessment & Plan  COVID-19 positive on 12/29/2023  Complicated by acute hypoxic respiratory failure  Remdesivir day 1  Baricitinib day 1  Decadron day 1  Wean O2 as tolerated  Incentive spirometry  Ambulate patient    Sepsis (HCC)  Assessment & Plan  Present on admission as evidenced by tachycardia and tachypnea  Likely secondary to bacterial pneumonia  Cefepime 2000 mg IV every 12 hours  Trend fever curve and WBC count and procalcitonin  Follow-up blood cultures    LULA (acute kidney injury) (HCC)  Assessment & Plan  Present on admission as evidenced by creatinine of 1.40 from baseline of approximately 0.99  Likely prerenal azotemia in the setting of Vince-19 viral pneumonia and possible bacterial pneumonia  IV fluid hydration with normal saline solution at 100 cc/h  Trend BMP  Avoid nephrotoxic agents and hypotension  Strict intake and output  Daily standing weights    Hypothyroidism  Assessment & Plan  Continue home levothyroxine    Essential hypertension  Assessment & Plan  Hold home ACE inhibitor in the setting of hypotension and acute kidney injury    Seizure disorder (HCC)  Assessment & Plan  Continue home carbamazepine and levetiracetam         VTE Prophylaxis: Heparin  Code Status: Level 1 full code    Anticipated Length of Stay:  Patient will be admitted on an Inpatient basis with an anticipated length of stay of greater than 2 midnights.   Justification for Hospital Stay: Sepsis    Total Time for Visit, including Counseling / Coordination of Care: I have spent a total time of 45 minutes on 12/29/23 in caring for this patient including Diagnostic results, Prognosis, Risks and benefits of tx options, Instructions for management, Patient and family education, Importance of tx  compliance, Risk factor reductions, Impressions, Counseling / Coordination of care, Documenting in the medical record, Reviewing / ordering tests, medicine, procedures  , Obtaining or reviewing history  , and Communicating with other healthcare professionals .    Chief Complaint: Shortness of breath    History of Present Illness:    Jose Roberto Herring is a 73 y.o. male with a past medical history significant for epilepsy, hyperlipidemia, hypertension, hypothyroidism, GERD who presents with complaints of shortness of breath.  The patient states that multiple families are sick at home with COVID-19.  The patient has no other complaints at this time other than shortness of breath.  In the ED, he was found to be requiring supplemental oxygen and was also found to be COVID-19 positive.  The patient met SIRS criteria in the setting of tachycardia and tachypnea and was found to have an elevated procalcitonin.  CT chest with likely left lower lobe consolidation.  The patient was initiated on broad-spectrum antibiotics and IV fluid resuscitation as well as treatment for mild COVID.    Review of Systems:    Review of Systems   Constitutional:  Negative for chills and fever.   HENT:  Negative for ear pain and sore throat.    Eyes:  Negative for pain and visual disturbance.   Respiratory:  Positive for shortness of breath. Negative for cough.    Cardiovascular:  Negative for chest pain and palpitations.   Gastrointestinal:  Negative for abdominal pain and vomiting.   Genitourinary:  Negative for dysuria and hematuria.   Musculoskeletal:  Negative for arthralgias and back pain.   Skin:  Negative for color change and rash.   Neurological:  Negative for seizures and syncope.   All other systems reviewed and are negative.      Past Medical and Surgical History:     Past Medical History:   Diagnosis Date    Arthritis     BPH (benign prostatic hyperplasia)     Full dentures     Gross hematuria     Hearing loss     [place] (hard of hearing)      right ear    Hyperlipidemia     Hypothyroid     Lesion of bladder     Low back pain     Malignant neoplasm of overlapping sites of bladder (HCC)     Malignant neoplasm of right kidney, except renal pelvis (HCC)     Prediabetes     Renal cyst     Right renal mass     Sciatica     Seizures (HCC)     last seizure 14 yrs ago    Wears glasses     reading       Past Surgical History:   Procedure Laterality Date    COLONOSCOPY      CYSTOSCOPY  2012, 2013, 2014    CYSTOSCOPY N/A 12/22/2017    Procedure: INTRAVESICAL MITOMYCIN;  Surgeon: Marlon Bernal MD;  [facility];  Service: Urology    CYSTOSCOPY W/ RETROGRADES Bilateral 2012    PARTIAL NEPHRECTOMY Right 2013    ND CYSTO BLADDER W/URETERAL CATHETERIZATION Bilateral 11/30/2018    Procedure: CYSTO, RETROGRADE PYELOGRAM;  Surgeon: Marlon Bernal MD;  [facility];  Service: Urology    ND CYSTOURETHROSCOPY W/DEST &/RMVL MED BLADDER RUDY N/A 12/22/2017    Procedure: TRANSURETHRAL RESECTION OF BLADDER TUMOR (TURBT);  Surgeon: Marlon Bernal MD;  [facility];  Service: Urology    ND CYSTOURETHROSCOPY W/DEST &/RMVL MED BLADDER RUDY N/A 11/30/2018    Procedure: TURBT;  Surgeon: Marlon Bernal MD;  [facility];  Service: Urology    RENAL CYST EXCISION      questionable malignancy    TRANSURETHRAL RESECTION OF PROSTATE  2012       Meds/Allergies:    Prior to Admission medications    Medication Sig Start Date End Date Taking? Authorizing Provider   acetaminophen (TYLENOL) 325 mg tablet Take 650 mg by mouth every 6 (six) hours as needed for mild pain    Historical Provider, MD   aspirin (ECOTRIN LOW STRENGTH) 81 mg EC tablet Take 81 mg by mouth daily    Historical Provider, MD   carBAMazepine (TEGretol XR) 400 mg 12 hr tablet Take 400 mg by mouth 3 (three) times a day    Historical Provider, MD   Cyanocobalamin (VITAMIN B-12 PO) Take 1 tablet by mouth daily    Historical Provider, MD   ferrous sulfate 324 (65 Fe) mg Take 1 PO BID  "before a meal 10/19/23   LEEANN Dubon   Folate-B12-Intrinsic Factor (INTRINSI B12-FOLATE) 800-500-20 MCG-MCG-MG TABS Take by oral route every other day    Historical Provider, MD   levETIRAcetam (KEPPRA) 500 mg tablet Take 500 mg by mouth 2 (two) times a day    Historical Provider, MD   levothyroxine 75 mcg tablet Take 50 mcg by mouth daily     Historical Provider, MD   lisinopril (ZESTRIL) 5 mg tablet Take 5 mg by mouth daily    Historical Provider, MD   Omega-3 Fatty Acids (FISH OIL PO) Take by mouth    Historical Provider, MD   pantoprazole (PROTONIX) 40 mg tablet Take 1 tablet (40 mg total) by mouth 2 (two) times a day 7/11/23 10/9/23  LEEANN Dubon   polyethylene glycol (Golytely) 4000 mL solution Take 4,000 mL by mouth once for 1 dose Take 4000 mL by mouth once for 1 dose. Use as directed 23  LEEANN Dubon   rosuvastatin (CRESTOR) 20 MG tablet Take 20 mg by mouth daily    Historical Provider, MD       Allergies:   Allergies   Allergen Reactions    Bactrim [Sulfamethoxazole-Trimethoprim] Itching, Hives and Rash     Rash, itching    Atorvastatin Drowsiness       Social History:     Marital Status: /Civil Union   Substance Use History:   Social History     Substance and Sexual Activity   Alcohol Use No    Comment: Former drinker.     Social History     Tobacco Use   Smoking Status Former    Current packs/day: 0.00    Average packs/day: 2.0 packs/day for 40.0 years (80.0 ttl pk-yrs)    Types: Cigarettes    Start date: 1974    Quit date: 2014    Years since quittin.0   Smokeless Tobacco Never     Social History     Substance and Sexual Activity   Drug Use No       Family History:    Pertinent family history reviewed    Physical Exam:     Vitals:   Blood Pressure: 115/66 (23 1712)  Pulse: 100 (23 1712)  Temperature: 99.6 °F (37.6 °C) (23 1416)  Temp Source: Tympanic (23 1416)  Respirations: 17 (23 1712)  Height: 6' 1\" (185.4 cm) " (12/29/23 1532)  Weight - Scale: 104 kg (230 lb) (12/29/23 1532)  SpO2: 95 % (12/29/23 1712)    Physical Exam  Vitals and nursing note reviewed.   Constitutional:       General: He is not in acute distress.     Appearance: He is well-developed.   HENT:      Head: Normocephalic and atraumatic.   Eyes:      Conjunctiva/sclera: Conjunctivae normal.   Cardiovascular:      Rate and Rhythm: Normal rate and regular rhythm.      Heart sounds: No murmur heard.  Pulmonary:      Effort: Pulmonary effort is normal. No respiratory distress.      Breath sounds: Normal breath sounds.   Abdominal:      Palpations: Abdomen is soft.      Tenderness: There is no abdominal tenderness.   Musculoskeletal:         General: No swelling.      Cervical back: Neck supple.   Skin:     General: Skin is warm and dry.      Capillary Refill: Capillary refill takes less than 2 seconds.   Neurological:      Mental Status: He is alert.   Psychiatric:         Mood and Affect: Mood normal.          Additional Data:     Lab Results: I have reviewed pertinent results     Results from last 7 days   Lab Units 12/29/23  1443   WBC Thousand/uL 10.84*   HEMOGLOBIN g/dL 12.6   HEMATOCRIT % 39.7   PLATELETS Thousands/uL 231   BANDS PCT % 13*   LYMPHO PCT % 6*   MONO PCT % 10   EOS PCT % 0     Results from last 7 days   Lab Units 12/29/23  1443 12/28/23  1122   SODIUM mmol/L 137 140   POTASSIUM mmol/L 4.2 4.7   CHLORIDE mmol/L 101 101   CO2 mmol/L 26 28   BUN mg/dL 21 18   CREATININE mg/dL 1.40* 0.99   ANION GAP mmol/L 10 11   CALCIUM mg/dL 8.7 9.6   ALBUMIN g/dL  --  4.3   TOTAL BILIRUBIN mg/dL  --  0.38   ALK PHOS U/L  --  97   ALT U/L  --  9   AST U/L  --  10*   GLUCOSE RANDOM mg/dL 130  --                  Results from last 7 days   Lab Units 12/29/23  1648 12/29/23  1443   LACTIC ACID mmol/L 1.3 2.5*   PROCALCITONIN ng/ml  --  0.31*       Imaging: I have reviewed pertinent imaging     TRAUMA - CT chest abdomen pelvis w contrast   Final Result by Gonzalo  Gem Torres MD (12/29 4352)      CHEST   - Patchy mixed consolidative and groundglass opacities in left lower lobe. Differential includes infection/inflammation, aspiration, and less likely pulmonary contusion (given history of trauma).   - Pulmonary nodules measuring up to 0.8 cm. Based on current Fleischner Society 2017 Guidelines on incidental pulmonary nodule, either PET/CT scan evaluation, tissue sampling or short term interval followup non-contrast CT followup (initially in 3    months) may be considered appropriate.      ABDOMEN AND PELVIS   - Small subcutaneous fat stranding in right lateral gluteal region, likely soft tissue contusion.   - New small intraluminal locules of gas in nondependent portion of bladder. Question recent urinary instrumentation. If no recent urinary instrumentation, gas-forming infectious organism is not excluded and urinalysis should be performed for further    evaluation.   - Small intraluminal hyperdense material in the small bowel. Differential includes ingested material, small bowel polyps, among other differentials. Recommend follow-up with gastroenterology.   - Stable postsurgical changes in posterior aspect of right kidney with fat necrosis and adjacent calcifications.      Additional chronic/incidental findings as detailed above.      The study was marked in EPIC for immediate notification.                  Workstation performed: [id number]         TRAUMA - CT spine cervical wo contrast   Final Result by Gonzalo Torres MD (12/29 4167)      No cervical spine fracture or traumatic malalignment.      Additional chronic/incidental findings as detailed above.      The study was marked in EPIC for immediate notification.               Workstation performed: [id number]         TRAUMA - CT head wo contrast   Final Result by Gonzalo Torres MD (12/29 1598)      Age-indeterminate lacunar infarct in left lateral putamen/external capsule. Consider follow-up MRI brain  without contrast for further evaluation.      No acute intracranial hemorrhage.      Mild chronic microangiopathy.      The study was marked in EPIC for immediate notification.                  Workstation performed: [id number]         XR Trauma chest portable   Final Result by Gonzalo Torres MD (12/29 1433)      New opacity in left lower lobe. Differential includes atelectasis, pneumonia, among other differentials. CT chest abdomen pelvis with contrast has been ordered.      The study was marked in EPIC for immediate notification.               Workstation performed: [id number]             EKG, Pathology, and Other Studies Reviewed on Admission:   EKG: NSR    Allscripts / Epic Records Reviewed    ** Please Note: This note has been constructed using a voice recognition system. **

## 2023-12-29 NOTE — ASSESSMENT & PLAN NOTE
COVID-19 positive on 12/29/2023  Complicated by acute hypoxic respiratory failure  Remdesivir day 1  Baricitinib day 1  Decadron day 1  Wean O2 as tolerated  Incentive spirometry  Ambulate patient

## 2023-12-29 NOTE — ED PROVIDER NOTES
Emergency Department Trauma Note  Jose Roberto Herring 73 y.o. male MRN: [id number]  Unit/Bed#: ED 20/ED 20 Encounter: [id number]      Trauma Alert: Trauma Acuity: B  Model of Arrival: Mode of Arrival: ALS via    Trauma Team: Current Providers  Attending Provider: Adarsh Pierce DO  Attending Provider: Mendel Shearer DO  Registered Nurse: Allison Byrd, RN  Registered Nurse: Maye Camacho RN  Consultants:     None      History of Present Illness     Chief Complaint:   Chief Complaint   Patient presents with    Fall     multiple    Weakness - Generalized    Hypotension     HPI:  Jose Roberto Herring is a 73 y.o. male who presents with reports of multiple falls and generalized weakness and fatigue for the last 1 week.  Patient's wife reportedly has COVID at home.  Denies head strikes but does take aspirin.  Mechanism:Details of Incident: Multiple falls, hypotension          HPI  Review of Systems   Constitutional:  Positive for fatigue.   Musculoskeletal:  Positive for gait problem.   Neurological:  Positive for weakness.   All other systems reviewed and are negative.      Historical Information     Immunizations:   Immunization History   Administered Date(s) Administered    COVID-19 MODERNA VACC 0.5 ML IM 02/13/2021, 03/13/2021, 11/29/2021    Pneumococcal Conjugate Vaccine 20-valent (Pcv20), Polysace 06/10/2022    Pneumococcal Polysaccharide PPV23 01/05/2021    Td (adult), Unspecified 08/28/2012    Tdap 08/28/2012, 11/09/2021    Zoster Vaccine Recombinant 07/28/2022, 10/03/2022       Past Medical History:   Diagnosis Date    Arthritis     BPH (benign prostatic hyperplasia)     Full dentures     Gross hematuria     Hearing loss     [place] (hard of hearing)     right ear    Hyperlipidemia     Hypothyroid     Lesion of bladder     Low back pain     Malignant neoplasm of overlapping sites of bladder (HCC)     Malignant neoplasm of right kidney, except renal pelvis (HCC)     Prediabetes     Renal cyst     Right  renal mass     Sciatica     Seizures (HCC)     last seizure 14 yrs ago    Wears glasses     reading       Family History   Problem Relation Age of Onset    Diabetes Family      Past Surgical History:   Procedure Laterality Date    COLONOSCOPY      CYSTOSCOPY  , ,     CYSTOSCOPY N/A 2017    Procedure: INTRAVESICAL MITOMYCIN;  Surgeon: Marlon Bernal MD;  Location: [facility];  Service: Urology    CYSTOSCOPY W/ RETROGRADES Bilateral 2012    PARTIAL NEPHRECTOMY Right 2013    NH CYSTO BLADDER W/URETERAL CATHETERIZATION Bilateral 2018    Procedure: CYSTO, RETROGRADE PYELOGRAM;  Surgeon: Marlon Bernal MD;  Location[facility];  Service: Urology    NH CYSTOURETHROSCOPY W/DEST &/RMVL MED BLADDER RUDY N/A 2017    Procedure: TRANSURETHRAL RESECTION OF BLADDER TUMOR (TURBT);  Surgeon: Marlon Bernal MD;  [facility];  Service: Urology    NH CYSTOURETHROSCOPY W/DEST &/RMVL MED BLADDER RUDY N/A 2018    Procedure: TURBT;  Surgeon: Marlon Bernal MD;  Location[facility];  Service: Urology    RENAL CYST EXCISION      questionable malignancy    TRANSURETHRAL RESECTION OF PROSTATE       Social History     Tobacco Use    Smoking status: Former     Current packs/day: 0.00     Average packs/day: 2.0 packs/day for 40.0 years (80.0 ttl pk-yrs)     Types: Cigarettes     Start date: 1974     Quit date: 2014     Years since quittin.0    Smokeless tobacco: Never   Vaping Use    Vaping status: Never Used   Substance Use Topics    Alcohol use: No     Comment: Former drinker.    Drug use: No     E-Cigarette/Vaping    E-Cigarette Use Never User      E-Cigarette/Vaping Substances       Family History: non-contributory    Meds/Allergies   Prior to Admission Medications   Prescriptions Last Dose Informant Patient Reported? Taking?   Cyanocobalamin (VITAMIN B-12 PO)  Self Yes No   Sig: Take 1 tablet by mouth daily   Folate-B12-Intrinsic Factor (INTRINSI B12-FOLATE)  800-500-20 MCG-MCG-MG TABS  Self Yes No   Sig: Take by oral route every other day   Omega-3 Fatty Acids (FISH OIL PO)  Self Yes No   Sig: Take by mouth   acetaminophen (TYLENOL) 325 mg tablet  Self Yes No   Sig: Take 650 mg by mouth every 6 (six) hours as needed for mild pain   aspirin (ECOTRIN LOW STRENGTH) 81 mg EC tablet  Self Yes No   Sig: Take 81 mg by mouth daily   carBAMazepine (TEGretol XR) 400 mg 12 hr tablet  Self Yes No   Sig: Take 400 mg by mouth 3 (three) times a day   ferrous sulfate 324 (65 Fe) mg   No No   Sig: Take 1 PO BID before a meal   levETIRAcetam (KEPPRA) 500 mg tablet   Yes No   Sig: Take 500 mg by mouth 2 (two) times a day   levothyroxine 75 mcg tablet  Self Yes No   Sig: Take 50 mcg by mouth daily    lisinopril (ZESTRIL) 5 mg tablet   Yes No   Sig: Take 5 mg by mouth daily   pantoprazole (PROTONIX) 40 mg tablet   No No   Sig: Take 1 tablet (40 mg total) by mouth 2 (two) times a day   polyethylene glycol (Golytely) 4000 mL solution   No No   Sig: Take 4,000 mL by mouth once for 1 dose Take 4000 mL by mouth once for 1 dose. Use as directed   rosuvastatin (CRESTOR) 20 MG tablet  Self Yes No   Sig: Take 20 mg by mouth daily      Facility-Administered Medications: None       Allergies   Allergen Reactions    Bactrim [Sulfamethoxazole-Trimethoprim] Itching, Hives and Rash     Rash, itching    Atorvastatin Drowsiness       PHYSICAL EXAM      Objective   Vitals:   First set: Temperature: 99.6 °F (37.6 °C) (12/29/23 1416)  Pulse: 82 (12/29/23 1416)  Respirations: 20 (12/29/23 1416)  Blood Pressure: 93/53 (12/29/23 1416)  SpO2: 96 % (12/29/23 1416)    Primary Survey:   (A) Airway: intact  (B) Breathing: equal B/L  (C) Circulation: Pulses:   normal  (D) Disabliity:  GCS Total:  15  (E) Expose:  Completed    Secondary Survey: (Click on Physical Exam tab above)  Physical Exam  Vitals and nursing note reviewed.   Constitutional:       General: He is in acute distress.      Appearance: He is  well-developed. He is ill-appearing and toxic-appearing.   HENT:      Head: Normocephalic and atraumatic.      Right Ear: External ear normal.      Left Ear: External ear normal.      Nose: Nose normal.   Eyes:      General: No scleral icterus.        Right eye: No discharge.         Left eye: No discharge.      Conjunctiva/sclera: Conjunctivae normal.   Cardiovascular:      Rate and Rhythm: Regular rhythm. Tachycardia present.      Heart sounds: Normal heart sounds. No murmur heard.     No friction rub. No gallop.   Pulmonary:      Effort: Pulmonary effort is normal. No respiratory distress.      Breath sounds: Rhonchi present. No wheezing or rales.   Abdominal:      General: Bowel sounds are normal. There is no distension.      Palpations: Abdomen is soft. There is no mass.      Tenderness: There is no abdominal tenderness. There is no guarding.   Musculoskeletal:         General: No tenderness or deformity. Normal range of motion.      Cervical back: Normal range of motion and neck supple.   Skin:     General: Skin is warm and dry.      Coloration: Skin is not pale.      Findings: No erythema or rash.   Neurological:      Mental Status: He is alert and oriented to person, place, and time.   Psychiatric:         Behavior: Behavior normal.         Thought Content: Thought content normal.         Judgment: Judgment normal.         Cervical spine cleared by clinical criteria? No (imaging required)      Invasive Devices       Peripheral Intravenous Line  Duration             Peripheral IV 12/29/23 Left;Ventral (anterior) Forearm <1 day    Peripheral IV 12/29/23 Right;Ventral (anterior) Forearm <1 day              Drain  Duration             Urethral Catheter 20 Fr. 2198 days    Urethral Catheter Latex;Double-lumen 22 Fr. 1855 days    Urethral Catheter 16 Fr. 41 days                    Lab Results:   Results Reviewed       Procedure Component Value Units Date/Time    Lactic acid 2 Hours [[id number]]  (Normal) Collected:  12/29/23 1648    Lab Status: Final result Specimen: Blood from Arm, Left Updated: 12/29/23 1732     LACTIC ACID 1.3 mmol/L     Narrative:      Result may be elevated if tourniquet was used during collection.    Urine Microscopic [[id number]]  (Abnormal) Collected: 12/29/23 1655    Lab Status: Final result Specimen: Urine, Clean Catch Updated: 12/29/23 1725     RBC, UA 4-10 /hpf      WBC, UA 4-10 /hpf      Epithelial Cells Occasional /hpf      Bacteria, UA Occasional /hpf     HS Troponin I 2hr [[id number]]  (Normal) Collected: 12/29/23 1648    Lab Status: Final result Specimen: Blood from Arm, Left Updated: 12/29/23 1725     hs TnI 2hr 5 ng/L      Delta 2hr hsTnI 0 ng/L     UA w Reflex to Microscopic w Reflex to Culture [[id number]]  (Abnormal) Collected: 12/29/23 1655    Lab Status: Final result Specimen: Urine, Clean Catch Updated: 12/29/23 1714     Color, UA Yellow     Clarity, UA Clear     Specific Gravity, UA <=1.005     pH, UA 6.5     Leukocytes, UA Negative     Nitrite, UA Negative     Protein, UA Trace mg/dl      Glucose, UA Negative mg/dl      Ketones, UA Negative mg/dl      Urobilinogen, UA 0.2 E.U./dl      Bilirubin, UA Negative     Occult Blood, UA 1+    HS Troponin I 4hr [[id number]]     Lab Status: No result Specimen: Blood     RBC Morphology Reflex Test [[id number]] Collected: 12/29/23 1443    Lab Status: Final result Specimen: Blood from Arm, Left Updated: 12/29/23 1601    FLU/RSV/COVID - if FLU/RSV clinically relevant [[id number]]  (Abnormal) Collected: 12/29/23 1443    Lab Status: Final result Specimen: Nares from Nose Updated: 12/29/23 1527     SARS-CoV-2 Positive     INFLUENZA A PCR Negative     INFLUENZA B PCR Negative     RSV PCR Negative    Narrative:      FOR PEDIATRIC PATIENTS - copy/paste COVID Guidelines URL to browser: [email or web address]    SARS-CoV-2 assay is a Nucleic Acid Amplification assay intended for the  qualitative detection of  nucleic acid from SARS-CoV-2 in nasopharyngeal  swabs. Results are for the presumptive identification of SARS-CoV-2 RNA.    Positive results are indicative of infection with SARS-CoV-2, the virus  causing COVID-19, but do not rule out bacterial infection or co-infection  with other viruses. Laboratories within the [place] and its  territories are required to report all positive results to the appropriate  public health authorities. Negative results do not preclude SARS-CoV-2  infection and should not be used as the sole basis for treatment or other  patient management decisions. Negative results must be combined with  clinical observations, patient history, and epidemiological information.  This test has not been FDA cleared or approved.    This test has been authorized by FDA under an Emergency Use Authorization  (EUA). This test is only authorized for the duration of time the  declaration that circumstances exist justifying the authorization of the  emergency use of an in vitro diagnostic tests for detection of SARS-CoV-2  virus and/or diagnosis of COVID-19 infection under section 564(b)(1) of  the Act, 21 U.S.C. 360bbb-3(b)(1), unless the authorization is terminated  or revoked sooner. The test has been validated but independent review by FDA  and CLIA is pending.    Test performed using [employer] GeneXpert: This RT-PCR assay targets N2,  a region unique to SARS-CoV-2. A conserved region in the E-gene was chosen  for pan-Sarbecovirus detection which includes SARS-CoV-2.    According to CMS-2020-01-R, this platform meets the definition of high-throughput technology.    Procalcitonin [[id number]]  (Abnormal) Collected: 12/29/23 1443    Lab Status: Final result Specimen: Blood from Arm, Left Updated: 12/29/23 1523     Procalcitonin 0.31 ng/ml     CBC and differential [[id number]]  (Abnormal) Collected: 12/29/23 1443    Lab Status: Final result Specimen: Blood from Arm, Left Updated: 12/29/23 1520     WBC 10.84  Thousand/uL      RBC 4.02 Million/uL      Hemoglobin 12.6 g/dL      Hematocrit 39.7 %      MCV 99 fL      MCH 31.3 pg      MCHC 31.7 g/dL      RDW 13.1 %      MPV 10.1 fL      Platelets 231 Thousands/uL     Narrative:      This is an appended report.  These results have been appended to a previously verified report.    Manual Differential(PHLEBS Do Not Order) [[id number]]  (Abnormal) Collected: 12/29/23 1443    Lab Status: Final result Specimen: Blood from Arm, Left Updated: 12/29/23 1520     Segmented % 67 %      Bands % 13 %      Lymphocytes % 6 %      Monocytes % 10 %      Eosinophils, % 0 %      Basophils % 0 %      Atypical Lymphocytes % 4 %      Absolute Neutrophils 8.67 Thousand/uL      Lymphocytes Absolute 1.08 Thousand/uL      Monocytes Absolute 1.08 Thousand/uL      Eosinophils Absolute 0.00 Thousand/uL      Basophils Absolute 0.00 Thousand/uL      Total Counted --     RBC Morphology Normal     Platelet Estimate Adequate    Lactic acid, plasma (w/reflex if result > 2.0) [[id number]]  (Abnormal) Collected: 12/29/23 1443    Lab Status: Final result Specimen: Blood from Arm, Left Updated: 12/29/23 1518     LACTIC ACID 2.5 mmol/L     Narrative:      Result may be elevated if tourniquet was used during collection.    HS Troponin 0hr (reflex protocol) [[id number]]  (Normal) Collected: 12/29/23 1443    Lab Status: Final result Specimen: Blood from Arm, Left Updated: 12/29/23 1517     hs TnI 0hr 5 ng/L     Blood culture #2 [[id number]] Collected: 12/29/23 1507    Lab Status: In process Specimen: Blood from Hand, Right Updated: 12/29/23 1513    Basic metabolic panel [[id number]]  (Abnormal) Collected: 12/29/23 1443    Lab Status: Final result Specimen: Blood from Arm, Left Updated: 12/29/23 1513     Sodium 137 mmol/L      Potassium 4.2 mmol/L      Chloride 101 mmol/L      CO2 26 mmol/L      ANION GAP 10 mmol/L      BUN 21 mg/dL      Creatinine 1.40 mg/dL      Glucose 130 mg/dL      Calcium 8.7 mg/dL      eGFR 49  ml/min/1.73sq m     Narrative:      National Kidney Disease Foundation guidelines for Chronic Kidney Disease (CKD):     Stage 1 with normal or high GFR (GFR > 90 mL/min/1.73 square meters)    Stage 2 Mild CKD (GFR = 60-89 mL/min/1.73 square meters)    Stage 3A Moderate CKD (GFR = 45-59 mL/min/1.73 square meters)    Stage 3B Moderate CKD (GFR = 30-44 mL/min/1.73 square meters)    Stage 4 Severe CKD (GFR = 15-29 mL/min/1.73 square meters)    Stage 5 End Stage CKD (GFR <15 mL/min/1.73 square meters)  Note: GFR calculation is accurate only with a steady state creatinine    Blood culture #1 [[id number]] Collected: 12/29/23 1443    Lab Status: In process Specimen: Blood from Arm, Left Updated: 12/29/23 1443                   Imaging Studies:   Direct to CT: No  TRAUMA - CT chest abdomen pelvis w contrast   Final Result by Gonzalo Torres MD (12/29 4299)      CHEST   - Patchy mixed consolidative and groundglass opacities in left lower lobe. Differential includes infection/inflammation, aspiration, and less likely pulmonary contusion (given history of trauma).   - Pulmonary nodules measuring up to 0.8 cm. Based on current Fleischner Society 2017 Guidelines on incidental pulmonary nodule, either PET/CT scan evaluation, tissue sampling or short term interval followup non-contrast CT followup (initially in 3    months) may be considered appropriate.      ABDOMEN AND PELVIS   - Small subcutaneous fat stranding in right lateral gluteal region, likely soft tissue contusion.   - New small intraluminal locules of gas in nondependent portion of bladder. Question recent urinary instrumentation. If no recent urinary instrumentation, gas-forming infectious organism is not excluded and urinalysis should be performed for further    evaluation.   - Small intraluminal hyperdense material in the small bowel. Differential includes ingested material, small bowel polyps, among other differentials. Recommend follow-up with  gastroenterology.   - Stable postsurgical changes in posterior aspect of right kidney with fat necrosis and adjacent calcifications.      Additional chronic/incidental findings as detailed above.      The study was marked in EPIC for immediate notification.                  Workstation performed: [id number]         TRAUMA - CT spine cervical wo contrast   Final Result by Gonzalo Torres MD (12/29 3725)      No cervical spine fracture or traumatic malalignment.      Additional chronic/incidental findings as detailed above.      The study was marked in EPIC for immediate notification.               Workstation performed: [id number]         TRAUMA - CT head wo contrast   Final Result by Gonzalo Torres MD (12/29 3645)      Age-indeterminate lacunar infarct in left lateral putamen/external capsule. Consider follow-up MRI brain without contrast for further evaluation.      No acute intracranial hemorrhage.      Mild chronic microangiopathy.      The study was marked in EPIC for immediate notification.                  Workstation performed: [id number]         XR Trauma chest portable   Final Result by Gonzalo Torres MD (12/29 6666)      New opacity in left lower lobe. Differential includes atelectasis, pneumonia, among other differentials. CT chest abdomen pelvis with contrast has been ordered.      The study was marked in EPIC for immediate notification.               Workstation performed: [id number]               Procedures  ECG 12 Lead Documentation Only    Date/Time: 12/29/2023 5:41 PM    Performed by: Adarsh Pierce DO  Authorized by: Adarsh Pierce DO    Interpretation:     Interpretation: normal    Rate:     ECG rate:  99    ECG rate assessment: normal    Rhythm:     Rhythm: sinus rhythm    Ectopy:     Ectopy: none    QRS:     QRS axis:  Normal    QRS intervals:  Normal  Conduction:     Conduction: normal    ST segments:     ST segments:  Normal  T waves:     T waves: normal     CriticalCare Time    Date/Time: 12/29/2023 5:41 PM    Performed by: Adarsh Pierce DO  Authorized by: Adarsh Pierce DO    Critical care provider statement:     Critical care time (minutes):  73    Critical care time was exclusive of:  Separately billable procedures and treating other patients and teaching time    Critical care was necessary to treat or prevent imminent or life-threatening deterioration of the following conditions:  Sepsis, shock and circulatory failure    Critical care was time spent personally by me on the following activities:  Blood draw for specimens, obtaining history from patient or surrogate, development of treatment plan with patient or surrogate, evaluation of patient's response to treatment, examination of patient, review of old charts, re-evaluation of patient's condition, ordering and review of radiographic studies, ordering and review of laboratory studies, ordering and performing treatments and interventions and interpretation of cardiac output measurements    I assumed direction of critical care for this patient from another provider in my specialty: no    POC FAST US    Date/Time: 12/29/2023 5:45 PM    Performed by: Adarsh Pierce DO  Authorized by: Adarsh Pierce DO    Patient location:  ED  Other Assisting Provider: No    Procedure details:     Exam Type:  Diagnostic    Indications: hypotension      Assess for:  Intra-abdominal fluid and pericardial effusion    Technique: FAST      Views obtained:  Heart - Pericardial sac, LUQ - Splenorenal space, RUQ - Dorman's Pouch and Suprapubic - Pouch of Scottie    Image quality: diagnostic      Image availability:  Images available in PACS and video obtained  FAST Findings:     RUQ (Hepatorenal) free fluid: absent      LUQ (Splenorenal) free fluid: absent      Suprapubic free fluid: absent      Cardiac wall motion: identified      Pericardial effusion: indeterminate    Interpretation:     Impressions: indeterminate     Comments:      Concern for pericardial effusion.  Formal echo ordered.  Formal echo showing no signs of pericardial effusion.           ED Course  ED Course as of 12/29/23 1743   Fri Dec 29, 2023   1422 Patient fully received 500 mL of normal saline prior to arrival and has another 1 L normal saline bag hanging from EMS which we will continue.   1424 On bedside FAST exam concern for possible pericardial effusion.  Formal Echocardiogram ordered immediately with echo tech at bedside.   1443 Patient febrile prehospital along with hypotensive and now with imaging showing likely pneumonia we will treat with Rocephin as patient coming from home.   1525 Procalcitonin(!): 0.31   1528 SARS-COV-2(!): Positive   1529 LACTIC ACID(!!): 2.5  With 1.5L from Pre-hospital will give 1 further liter which will cover his 30cc/kg based on IBW   1742 Patient initially discussed with critical care however with patient spotting to IV fluids will admit stepdown level 2.           Medical Decision Making  Patient initially presenting in septic shock received 30 cc/kg with prehospital and hospital administered fluids of 2.5 L for his ideal body weight of 80 kg.  Given Rocephin and doxycycline for possible superimposed bacterial pneumonia on top of COVID.  Trauma scans negative patient mated stepdown level 2 at this time.    Amount and/or Complexity of Data Reviewed  Labs: ordered. Decision-making details documented in ED Course.  Radiology: ordered.    Risk  Prescription drug management.  Decision regarding hospitalization.                Disposition  [facility] Transfer: No  Final diagnoses:   Pneumonia   Sepsis (HCC)   Age indeterminate Stroke (cerebrum) (HCC)     Time reflects when diagnosis was documented in both MDM as applicable and the Disposition within this note       Time User Action Codes Description Comment    12/29/2023  5:39 PM Adarsh Pierce Add [J18.9] Pneumonia     12/29/2023  5:39 PM Adarsh Pierce Add [A41.9]  Sepsis ([facility])     12/29/2023  5:39 PM Adarsh Pierce Add [I63.9] Stroke (cerebrum) ([facility])     12/29/2023  5:39 PM Adarsh Pierce Modify [I63.9] Age indeterminate Stroke (cerebrum) (Formerly [facility])           ED Disposition       ED Disposition   Admit    Condition   Stable    Date/Time   Fri Dec 29, 2023  5:39 PM    Comment   Case was discussed with GRISEL and the patient's admission status was agreed to be Admission Status: inpatient status to the service of Dr. Shearer.               Follow-up Information    None       Patient's Medications   Discharge Prescriptions    No medications on file     No discharge procedures on file.    PDMP Review       None            ED Provider  Electronically Signed by           Adarsh Pierce DO  12/29/23 6429

## 2023-12-29 NOTE — ASSESSMENT & PLAN NOTE
Present on admission as evidenced by tachycardia and tachypnea  Likely secondary to bacterial pneumonia  Cefepime 2000 mg IV every 12 hours  Trend fever curve and WBC count and procalcitonin  Follow-up blood cultures

## 2023-12-29 NOTE — ASSESSMENT & PLAN NOTE
Present on admission as evidenced by creatinine of 1.40 from baseline of approximately 0.99  Likely prerenal azotemia in the setting of Vince-19 viral pneumonia and possible bacterial pneumonia  IV fluid hydration with normal saline solution at 100 cc/h  Trend BMP  Avoid nephrotoxic agents and hypotension  Strict intake and output  Daily standing weights

## 2023-12-30 PROBLEM — E83.42 HYPOMAGNESEMIA: Status: ACTIVE | Noted: 2023-12-30

## 2023-12-30 LAB
ANION GAP SERPL CALCULATED.3IONS-SCNC: 7 MMOL/L
BACTERIA UR CULT: NORMAL
BASOPHILS # BLD AUTO: 0.02 THOUSANDS/ÂΜL (ref 0–0.1)
BASOPHILS NFR BLD AUTO: 0 % (ref 0–1)
BUN SERPL-MCNC: 17 MG/DL (ref 5–25)
CALCIUM SERPL-MCNC: 8.4 MG/DL (ref 8.4–10.2)
CHLORIDE SERPL-SCNC: 105 MMOL/L (ref 96–108)
CO2 SERPL-SCNC: 28 MMOL/L (ref 21–32)
CREAT SERPL-MCNC: 1.05 MG/DL (ref 0.6–1.3)
EOSINOPHIL # BLD AUTO: 0 THOUSAND/ÂΜL (ref 0–0.61)
EOSINOPHIL NFR BLD AUTO: 0 % (ref 0–6)
ERYTHROCYTE [DISTWIDTH] IN BLOOD BY AUTOMATED COUNT: 13.2 % (ref 11.6–15.1)
GFR SERPL CREATININE-BSD FRML MDRD: 70 ML/MIN/1.73SQ M
GLUCOSE SERPL-MCNC: 109 MG/DL (ref 65–140)
HCT VFR BLD AUTO: 37.9 % (ref 36.5–49.3)
HGB BLD-MCNC: 12.2 G/DL (ref 12–17)
IMM GRANULOCYTES # BLD AUTO: 0.01 THOUSAND/UL (ref 0–0.2)
IMM GRANULOCYTES NFR BLD AUTO: 0 % (ref 0–2)
LYMPHOCYTES # BLD AUTO: 0.9 THOUSANDS/ÂΜL (ref 0.6–4.47)
LYMPHOCYTES NFR BLD AUTO: 13 % (ref 14–44)
MAGNESIUM SERPL-MCNC: 1.7 MG/DL (ref 1.9–2.7)
MCH RBC QN AUTO: 31.6 PG (ref 26.8–34.3)
MCHC RBC AUTO-ENTMCNC: 32.2 G/DL (ref 31.4–37.4)
MCV RBC AUTO: 98 FL (ref 82–98)
MONOCYTES # BLD AUTO: 0.77 THOUSAND/ÂΜL (ref 0.17–1.22)
MONOCYTES NFR BLD AUTO: 11 % (ref 4–12)
NEUTROPHILS # BLD AUTO: 5.27 THOUSANDS/ÂΜL (ref 1.85–7.62)
NEUTS SEG NFR BLD AUTO: 76 % (ref 43–75)
NRBC BLD AUTO-RTO: 0 /100 WBCS
PHOSPHATE SERPL-MCNC: 4.2 MG/DL (ref 2.3–4.1)
PLATELET # BLD AUTO: 203 THOUSANDS/UL (ref 149–390)
PMV BLD AUTO: 9.7 FL (ref 8.9–12.7)
POTASSIUM SERPL-SCNC: 4.7 MMOL/L (ref 3.5–5.3)
PROCALCITONIN SERPL-MCNC: 0.11 NG/ML
RBC # BLD AUTO: 3.86 MILLION/UL (ref 3.88–5.62)
SODIUM SERPL-SCNC: 140 MMOL/L (ref 135–147)
WBC # BLD AUTO: 6.97 THOUSAND/UL (ref 4.31–10.16)

## 2023-12-30 PROCEDURE — 80048 BASIC METABOLIC PNL TOTAL CA: CPT | Performed by: INTERNAL MEDICINE

## 2023-12-30 PROCEDURE — 83735 ASSAY OF MAGNESIUM: CPT | Performed by: INTERNAL MEDICINE

## 2023-12-30 PROCEDURE — 84145 PROCALCITONIN (PCT): CPT | Performed by: INTERNAL MEDICINE

## 2023-12-30 PROCEDURE — 85025 COMPLETE CBC W/AUTO DIFF WBC: CPT | Performed by: INTERNAL MEDICINE

## 2023-12-30 PROCEDURE — 84100 ASSAY OF PHOSPHORUS: CPT | Performed by: INTERNAL MEDICINE

## 2023-12-30 PROCEDURE — XW033E5 INTRODUCTION OF REMDESIVIR ANTI-INFECTIVE INTO PERIPHERAL VEIN, PERCUTANEOUS APPROACH, NEW TECHNOLOGY GROUP 5: ICD-10-PCS | Performed by: INTERNAL MEDICINE

## 2023-12-30 PROCEDURE — 99233 SBSQ HOSP IP/OBS HIGH 50: CPT

## 2023-12-30 RX ORDER — FERROUS SULFATE 325(65) MG
325 TABLET ORAL 2 TIMES DAILY WITH MEALS
Status: DISCONTINUED | OUTPATIENT
Start: 2023-12-30 | End: 2023-12-31 | Stop reason: HOSPADM

## 2023-12-30 RX ORDER — MAGNESIUM SULFATE HEPTAHYDRATE 40 MG/ML
2 INJECTION, SOLUTION INTRAVENOUS ONCE
Status: COMPLETED | OUTPATIENT
Start: 2023-12-30 | End: 2023-12-30

## 2023-12-30 RX ADMIN — ATORVASTATIN CALCIUM 40 MG: 40 TABLET, FILM COATED ORAL at 15:58

## 2023-12-30 RX ADMIN — LEVETIRACETAM 500 MG: 500 TABLET, FILM COATED ORAL at 08:04

## 2023-12-30 RX ADMIN — HEPARIN SODIUM 5000 UNITS: 5000 INJECTION INTRAVENOUS; SUBCUTANEOUS at 21:03

## 2023-12-30 RX ADMIN — HEPARIN SODIUM 5000 UNITS: 5000 INJECTION INTRAVENOUS; SUBCUTANEOUS at 05:47

## 2023-12-30 RX ADMIN — MAGNESIUM SULFATE HEPTAHYDRATE 2 G: 40 INJECTION, SOLUTION INTRAVENOUS at 09:05

## 2023-12-30 RX ADMIN — CARBAMAZEPINE 400 MG: 200 TABLET, EXTENDED RELEASE ORAL at 15:58

## 2023-12-30 RX ADMIN — REMDESIVIR 100 MG: 100 INJECTION, POWDER, LYOPHILIZED, FOR SOLUTION INTRAVENOUS at 20:00

## 2023-12-30 RX ADMIN — PANTOPRAZOLE SODIUM 40 MG: 40 TABLET, DELAYED RELEASE ORAL at 08:04

## 2023-12-30 RX ADMIN — CARBAMAZEPINE 400 MG: 200 TABLET, EXTENDED RELEASE ORAL at 20:42

## 2023-12-30 RX ADMIN — CARBAMAZEPINE 400 MG: 200 TABLET, EXTENDED RELEASE ORAL at 08:06

## 2023-12-30 RX ADMIN — PANTOPRAZOLE SODIUM 40 MG: 40 TABLET, DELAYED RELEASE ORAL at 20:42

## 2023-12-30 RX ADMIN — ASPIRIN 81 MG: 81 TABLET, COATED ORAL at 08:04

## 2023-12-30 RX ADMIN — CEFEPIME HYDROCHLORIDE 2000 MG: 2 INJECTION, SOLUTION INTRAVENOUS at 08:00

## 2023-12-30 RX ADMIN — SODIUM CHLORIDE 100 ML/HR: 0.9 INJECTION, SOLUTION INTRAVENOUS at 05:55

## 2023-12-30 RX ADMIN — LEVETIRACETAM 500 MG: 500 TABLET, FILM COATED ORAL at 17:04

## 2023-12-30 RX ADMIN — LEVOTHYROXINE SODIUM 50 MCG: 50 TABLET ORAL at 05:48

## 2023-12-30 RX ADMIN — CEFEPIME HYDROCHLORIDE 2000 MG: 2 INJECTION, SOLUTION INTRAVENOUS at 18:44

## 2023-12-30 RX ADMIN — DEXAMETHASONE SODIUM PHOSPHATE 6 MG: 10 INJECTION, SOLUTION INTRAMUSCULAR; INTRAVENOUS at 17:54

## 2023-12-30 RX ADMIN — BARICITINIB 2 MG: 2 TABLET, FILM COATED ORAL at 17:53

## 2023-12-30 RX ADMIN — ACETAMINOPHEN 650 MG: 325 TABLET ORAL at 21:10

## 2023-12-30 RX ADMIN — HEPARIN SODIUM 5000 UNITS: 5000 INJECTION INTRAVENOUS; SUBCUTANEOUS at 14:01

## 2023-12-30 RX ADMIN — FERROUS SULFATE TAB 325 MG (65 MG ELEMENTAL FE) 325 MG: 325 (65 FE) TAB at 17:04

## 2023-12-30 NOTE — PROGRESS NOTES
"2030: Pt arrived from ER to icu bed 6. Pt COVID +, airborne & contact precautions initiated. Pt placed on monitor. Sinus tach. Pt a+ox4, follows commands. Pt a little forgetful at times.     0100: Pt attempted to get OOB, ripped his IV out in the process. Pt assisted back into bed, cleaned up, & new peripheral IV inserted. Bed alarm on for safety precautions.     0400: Pt pulled oxygen out of nose, sats down to 83%. Oxygen replaced. Pt arguing that it's not oxygen. Pt confused; yelling \"I know what this is. All these wires and shit. It's experimental nonsense.\" Re-oriented pt to environment and surroundings.     0545: Morning blood work obtained & sent to lab. Pt oriented to self & place. Pt remembers waking up in the middle of the night, but claims he was not confused. \"Whatever was coming through the tubing was not oxygen. The bad dreams I was having stopped once I pulled the oxygen out of my nose.\" Assured pt that it was only oxygen coming through the tubing. Pt took meds without difficulty. Emptied urinal for 125 ml clear yellow urine.     "

## 2023-12-30 NOTE — ASSESSMENT & PLAN NOTE
Present on admission  Baseline creatinine appears to be around 1.0  Creatinine on admission 1.4  Likely prerenal azotemia in the setting of Vince-19 viral pneumonia and possible bacterial pneumonia  Has been receiving IV fluids at 100 mL/hr, will dc  Creatinine back to baseline today  Avoid nephrotoxic agents and hypotension  Strict intake and output  Daily standing weights

## 2023-12-30 NOTE — ASSESSMENT & PLAN NOTE
Present on admission  Evidenced by tachycardia, tachypnea in setting of pneumonia  Left lower lobe infiltrate noted on chest x-ray  Lactic acid 2.5, repeat 1.3 after fluid resuscitation  Procalcitonin 0.32, trending downward today 0.11  Blood cultures x 2 pending  12/9 tested COVID-positive  Continue cefepime 2000 mg IV every 12 hours  Respiratory protocol wean oxygen as able  Encourage out of bed to chair  I-S

## 2023-12-30 NOTE — PROGRESS NOTES
[facility] Note  Name: Jose Roberto Herring I  MRN: [id number]  Unit/Bed#: ICU 06-01 I Date of Admission: 12/29/2023   Date of Service: 12/30/2023 I Hospital Day: 1    Assessment/Plan   * COVID-19  Assessment & Plan  COVID-19 positive on 12/29/2023  Complicated by acute hypoxic respiratory failure  Currently on 3 L nasal cannula oxygen  Baseline is no oxygen  Continue remdesivir day 2  Continue baricitinib day 2  Continue Decadron day 2  Continue supportive care for COVID  Isolation precautions  Respiratory protocol, wean oxygen as able  Incentive spirometry  Ambulate patient, out of bed to chair    Sepsis ([facility])  Assessment & Plan  Present on admission  Evidenced by tachycardia, tachypnea in setting of pneumonia  Left lower lobe infiltrate noted on chest x-ray  Lactic acid 2.5, repeat 1.3 after fluid resuscitation  Procalcitonin 0.32, trending downward today 0.11  Blood cultures x 2 pending  12/9 tested COVID-positive  Continue cefepime 2000 mg IV every 12 hours  Respiratory protocol wean oxygen as able  Encourage out of bed to chair  I-S    Seizure disorder ([facility])  Assessment & Plan  Continue home carbamazepine and levetiracetam    LULA (acute kidney injury) ([facility])  Assessment & Plan  Present on admission  Baseline creatinine appears to be around 1.0  Creatinine on admission 1.4  Likely prerenal azotemia in the setting of Vince-19 viral pneumonia and possible bacterial pneumonia  Has been receiving IV fluids at 100 mL/hr, will dc  Creatinine back to baseline today  Avoid nephrotoxic agents and hypotension  Strict intake and output  Daily standing weights    Essential hypertension  Assessment & Plan  BP controlled currently   Home ACE on hold given LULA  Monitor BP per protocol    Hypothyroidism  Assessment & Plan  Continue home levothyroxine    Hypomagnesemia  Assessment & Plan  Serum magnesium 1.7  Repleted  Repeat magnesium in a.m.               VTE Pharmacologic Prophylaxis: VTE Score:  3 Moderate Risk (Score 3-4) - Pharmacological DVT Prophylaxis Ordered: heparin.    Mobility:   Basic Mobility Inpatient Raw Score: 18  -HLM Goal: 6: Walk 10 steps or more  JH-HLM Achieved: 2: Bed activities/Dependent transfer  HLM Goal NOT achieved. Continue with multidisciplinary rounding and encourage appropriate mobility to improve upon HLM goals.    Patient Centered Rounds: I performed bedside rounds with nursing staff today.   Discussions with Specialists or Other Care Team Provider: Nursing, case management    Education and Discussions with Family / Patient: Updated  (wife) via phone.    Total Time Spent on Date of Encounter in care of patient: 40 mins. This time was spent on one or more of the following: performing physical exam; counseling and coordination of care; obtaining or reviewing history; documenting in the medical record; reviewing/ordering tests, medications or procedures; communicating with other healthcare professionals and discussing with patient's family/caregivers.    Current Length of Stay: 1 day(s)  Current Patient Status: Inpatient   Certification Statement: The patient will continue to require additional inpatient hospital stay due to continues on IV antibiotics, IV antivirals, IV steroids, treating per mild COVID pathway  Discharge Plan:  Pending hospital course.  Today is day 2 for IV antivirals IV steroids treating for mild COVID pathway.  Also treating for bacterial pneumonia with IV antibiotics.    Code Status: Level 1 - Full Code    Subjective:   Patient states he is feeling better today, less short of breath.  Wondering why he cannot go home.    Objective:     Vitals:   Temp (24hrs), Av.1 °F (37.8 °C), Min:98.5 °F (36.9 °C), Max:102.4 °F (39.1 °C)    Temp:  [98.5 °F (36.9 °C)-102.4 °F (39.1 °C)] 98.5 °F (36.9 °C)  HR:  [] 83  Resp:  [12-31] 23  BP: ()/(44-79) 163/79  SpO2:  [86 %-98 %] 96 %  Body mass index is 27.88 kg/m².     Input and Output  Summary (last 24 hours):     Intake/Output Summary (Last 24 hours) at 12/30/2023 0839  Last data filed at 12/30/2023 0818  Gross per 24 hour   Intake 3305 ml   Output 1075 ml   Net 2230 ml       Physical Exam:   Physical Exam  Vitals and nursing note reviewed.   Constitutional:       General: He is not in acute distress.     Appearance: He is well-developed.   HENT:      Head: Normocephalic and atraumatic.      Nose: Nose normal.      Mouth/Throat:      Mouth: Mucous membranes are moist.   Eyes:      Extraocular Movements: Extraocular movements intact.      Conjunctiva/sclera: Conjunctivae normal.      Pupils: Pupils are equal, round, and reactive to light.   Cardiovascular:      Rate and Rhythm: Normal rate and regular rhythm.      Pulses: Normal pulses.      Heart sounds: Normal heart sounds. No murmur heard.  Pulmonary:      Effort: Pulmonary effort is normal. No respiratory distress.      Breath sounds: No wheezing or rales.      Comments: Lungs clear with decreased breath sounds bilateral bases, nonlabored respirations at rest on O2 3 L nasal cannula, no cough  Abdominal:      General: Bowel sounds are normal. There is no distension.      Palpations: Abdomen is soft.      Tenderness: There is no abdominal tenderness. There is no guarding.   Musculoskeletal:         General: No swelling. Normal range of motion.   Skin:     General: Skin is warm and dry.      Capillary Refill: Capillary refill takes less than 2 seconds.   Neurological:      General: No focal deficit present.      Mental Status: He is alert and oriented to person, place, and time. Mental status is at baseline.   Psychiatric:         Mood and Affect: Mood normal.         Behavior: Behavior normal.          Additional Data:     Labs:  Results from last 7 days   Lab Units 12/30/23  0541 12/29/23  1443   WBC Thousand/uL 6.97 10.84*   HEMOGLOBIN g/dL 12.2 12.6   HEMATOCRIT % 37.9 39.7   PLATELETS Thousands/uL 203 231   BANDS PCT %  --  13*   NEUTROS  PCT % 76*  --    LYMPHS PCT % 13*  --    LYMPHO PCT %  --  6*   MONOS PCT % 11  --    MONO PCT %  --  10   EOS PCT % 0 0     Results from last 7 days   Lab Units 12/30/23  0541 12/29/23  1443 12/28/23  1122   SODIUM mmol/L 140   < > 140   POTASSIUM mmol/L 4.7   < > 4.7   CHLORIDE mmol/L 105   < > 101   CO2 mmol/L 28   < > 28   BUN mg/dL 17   < > 18   CREATININE mg/dL 1.05   < > 0.99   ANION GAP mmol/L 7   < > 11   CALCIUM mg/dL 8.4   < > 9.6   ALBUMIN g/dL  --   --  4.3   TOTAL BILIRUBIN mg/dL  --   --  0.38   ALK PHOS U/L  --   --  97   ALT U/L  --   --  9   AST U/L  --   --  10*   GLUCOSE RANDOM mg/dL 109   < >  --     < > = values in this interval not displayed.                 Results from last 7 days   Lab Units 12/30/23  0541 12/29/23  1648 12/29/23  1443   LACTIC ACID mmol/L  --  1.3 2.5*   PROCALCITONIN ng/ml 0.11  --  0.31*       Lines/Drains:  Invasive Devices       Peripheral Intravenous Line  Duration             Peripheral IV 12/29/23 Right;Ventral (anterior) Forearm <1 day    Peripheral IV 12/30/23 Dorsal (posterior);Left Forearm <1 day              Drain  Duration             Urethral Catheter 20 Fr. 2198 days    Urethral Catheter Latex;Double-lumen 22 Fr. 1855 days    Urethral Catheter 16 Fr. 41 days                  Urinary Catheter:  Goal for removal: No longer needed. Will place order to discontinue               Imaging: Reviewed radiology reports from this admission including: chest xray, chest CT scan, abdominal/pelvic CT, and CT head    Recent Cultures (last 7 days):   Results from last 7 days   Lab Units 12/29/23  1507 12/29/23  1443   BLOOD CULTURE  Received in Microbiology Lab. Culture in Progress. Received in Microbiology Lab. Culture in Progress.       Last 24 Hours Medication List:   Current Facility-Administered Medications   Medication Dose Route Frequency Provider Last Rate    acetaminophen  650 mg Oral Q6H PRN Roseann Singh PA-C      aspirin  81 mg Oral Daily Mendel PAEZ  Yorty, DO      atorvastatin  40 mg Oral Daily With Dinner Mendel C Yorty, DO      baricitinib  2 mg Oral Q24H Mendel C Yorty, DO      carBAMazepine  400 mg Oral TID Mendel C Yorty, DO      cefepime  2,000 mg Intravenous Q12H Mendel C Yorty, DO 2,000 mg (12/30/23 0800)    dexamethasone  6 mg Intravenous Q24H Mendel C Yorty, DO      heparin (porcine)  5,000 Units Subcutaneous Q8H [facility] Mendel C Yorty, DO      levETIRAcetam  500 mg Oral BID Mendel C Yorty, DO      levothyroxine  50 mcg Oral Daily Mendel C Yorty, DO      magnesium sulfate  2 g Intravenous Once LEEANN Muñoz      ondansetron  4 mg Intravenous Q6H PRN Mendel C Yorty, DO      pantoprazole  40 mg Oral BID Mendel C Yorty, DO      remdesivir  100 mg Intravenous Q24H Mendel C Yorty, DO      sodium chloride  100 mL/hr Intravenous Continuous Mendel C Yorty,  mL/hr (12/30/23 0555)        Today, Patient Was Seen By: LEEANN Muñoz    **Please Note: This note may have been constructed using a voice recognition system.**

## 2023-12-30 NOTE — PLAN OF CARE
Problem: PAIN - ADULT  Goal: Verbalizes/displays adequate comfort level or baseline comfort level  Description: Interventions:  - Encourage patient to monitor pain and request assistance  - Assess pain using appropriate pain scale  - Administer analgesics based on type and severity of pain and evaluate response  - Implement non-pharmacological measures as appropriate and evaluate response  - Consider cultural and social influences on pain and pain management  - Notify physician/advanced practitioner if interventions unsuccessful or patient reports new pain  Outcome: Progressing     Problem: INFECTION - ADULT  Goal: Absence or prevention of progression during hospitalization  Description: INTERVENTIONS:  - Assess and monitor for signs and symptoms of infection  - Monitor lab/diagnostic results  - Monitor all insertion sites, i.e. indwelling lines, tubes, and drains  - Monitor endotracheal if appropriate and nasal secretions for changes in amount and color  - [place] appropriate cooling/warming therapies per order  - Administer medications as ordered  - Instruct and encourage patient and family to use good hand hygiene technique  - Identify and instruct in appropriate isolation precautions for identified infection/condition  Outcome: Progressing  Goal: Absence of fever/infection during neutropenic period  Description: INTERVENTIONS:  - Monitor WBC    Outcome: Progressing

## 2023-12-30 NOTE — ASSESSMENT & PLAN NOTE
COVID-19 positive on 12/29/2023  Complicated by acute hypoxic respiratory failure  Currently on 3 L nasal cannula oxygen  Baseline is no oxygen  Continue remdesivir day 2  Continue baricitinib day 2  Continue Decadron day 2  Continue supportive care for COVID  Isolation precautions  Respiratory protocol, wean oxygen as able  Incentive spirometry  Ambulate patient, out of bed to chair

## 2023-12-30 NOTE — PLAN OF CARE
Problem: PAIN - ADULT  Goal: Verbalizes/displays adequate comfort level or baseline comfort level  Description: Interventions:  - Encourage patient to monitor pain and request assistance  - Assess pain using appropriate pain scale  - Administer analgesics based on type and severity of pain and evaluate response  - Implement non-pharmacological measures as appropriate and evaluate response  - Consider cultural and social influences on pain and pain management  - Notify physician/advanced practitioner if interventions unsuccessful or patient reports new pain  Outcome: Progressing     Problem: INFECTION - ADULT  Goal: Absence or prevention of progression during hospitalization  Description: INTERVENTIONS:  - Assess and monitor for signs and symptoms of infection  - Monitor lab/diagnostic results  - Monitor all insertion sites, i.e. indwelling lines, tubes, and drains  - Monitor endotracheal if appropriate and nasal secretions for changes in amount and color  - [place] appropriate cooling/warming therapies per order  - Administer medications as ordered  - Instruct and encourage patient and family to use good hand hygiene technique  - Identify and instruct in appropriate isolation precautions for identified infection/condition  Outcome: Progressing     Problem: SAFETY ADULT  Goal: Patient will remain free of falls  Description: INTERVENTIONS:  - Educate patient/family on patient safety including physical limitations  - Instruct patient to call for assistance with activity   - Consult OT/PT to assist with strengthening/mobility   - Keep Call bell within reach  - Keep bed low and locked with side rails adjusted as appropriate  - Keep care items and personal belongings within reach  - Initiate and maintain comfort rounds  - Make Fall Risk Sign visible to staff  - Offer Toileting every 2 Hours, in advance of need  - Initiate/Maintain bed alarm  - Obtain necessary fall risk management equipment  - Apply yellow socks and  bracelet for high fall risk patients  - Consider moving patient to room near nurses station  Outcome: Progressing     Problem: Knowledge Deficit  Goal: Patient/family/caregiver demonstrates understanding of disease process, treatment plan, medications, and discharge instructions  Description: Complete learning assessment and assess knowledge base.  Interventions:  - Provide teaching at level of understanding  - Provide teaching via preferred learning methods  Outcome: Progressing     Problem: Nutrition/Hydration-ADULT  Goal: Nutrient/Hydration intake appropriate for improving, restoring or maintaining nutritional needs  Description: Monitor and assess patient's nutrition/hydration status for malnutrition. Collaborate with interdisciplinary team and initiate plan and interventions as ordered.  Monitor patient's weight and dietary intake as ordered or per policy. Utilize nutrition screening tool and intervene as necessary. Determine patient's food preferences and provide high-protein, high-caloric foods as appropriate.     INTERVENTIONS:  - Monitor oral intake, urinary output, labs, and treatment plans  - Assess nutrition and hydration status and recommend course of action  - Evaluate amount of meals eaten  - Assist patient with eating if necessary   - Allow adequate time for meals  - Recommend/ encourage appropriate diets, oral nutritional supplements, and vitamin/mineral supplements  - Order, calculate, and assess calorie counts as needed  - Recommend, monitor, and adjust tube feedings and TPN/PPN based on assessed needs  - Assess need for intravenous fluids  - Provide specific nutrition/hydration education as appropriate  - Include patient/family/caregiver in decisions related to nutrition  Outcome: Progressing     Problem: RESPIRATORY - ADULT  Goal: Achieves optimal ventilation and oxygenation  Description: INTERVENTIONS:  - Assess for changes in respiratory status  - Assess for changes in mentation and  behavior  - Position to facilitate oxygenation and minimize respiratory effort  - Oxygen administered by appropriate delivery if ordered  - Initiate smoking cessation education as indicated  - Encourage broncho-pulmonary hygiene including cough, deep breathe, Incentive Spirometry  - Assess the need for suctioning and aspirate as needed  - Assess and instruct to report SOB or any respiratory difficulty  - Respiratory Therapy support as indicated  Outcome: Progressing

## 2023-12-31 VITALS
DIASTOLIC BLOOD PRESSURE: 60 MMHG | SYSTOLIC BLOOD PRESSURE: 119 MMHG | OXYGEN SATURATION: 97 % | RESPIRATION RATE: 16 BRPM | TEMPERATURE: 98.6 F | HEIGHT: 74 IN | BODY MASS INDEX: 28.07 KG/M2 | HEART RATE: 83 BPM | WEIGHT: 218.7 LBS

## 2023-12-31 LAB
ANION GAP SERPL CALCULATED.3IONS-SCNC: 10 MMOL/L
ATRIAL RATE: 93 BPM
ATRIAL RATE: 99 BPM
BASOPHILS # BLD AUTO: 0.03 THOUSANDS/ÂΜL (ref 0–0.1)
BASOPHILS NFR BLD AUTO: 1 % (ref 0–1)
BUN SERPL-MCNC: 21 MG/DL (ref 5–25)
CALCIUM SERPL-MCNC: 8.2 MG/DL (ref 8.4–10.2)
CHLORIDE SERPL-SCNC: 104 MMOL/L (ref 96–108)
CO2 SERPL-SCNC: 24 MMOL/L (ref 21–32)
CREAT SERPL-MCNC: 1.06 MG/DL (ref 0.6–1.3)
EOSINOPHIL # BLD AUTO: 0 THOUSAND/ÂΜL (ref 0–0.61)
EOSINOPHIL NFR BLD AUTO: 0 % (ref 0–6)
ERYTHROCYTE [DISTWIDTH] IN BLOOD BY AUTOMATED COUNT: 13.1 % (ref 11.6–15.1)
GFR SERPL CREATININE-BSD FRML MDRD: 69 ML/MIN/1.73SQ M
GLUCOSE SERPL-MCNC: 92 MG/DL (ref 65–140)
HCT VFR BLD AUTO: 36.2 % (ref 36.5–49.3)
HGB BLD-MCNC: 11.8 G/DL (ref 12–17)
IMM GRANULOCYTES # BLD AUTO: 0.01 THOUSAND/UL (ref 0–0.2)
IMM GRANULOCYTES NFR BLD AUTO: 0 % (ref 0–2)
LYMPHOCYTES # BLD AUTO: 1.35 THOUSANDS/ÂΜL (ref 0.6–4.47)
LYMPHOCYTES NFR BLD AUTO: 29 % (ref 14–44)
MAGNESIUM SERPL-MCNC: 2 MG/DL (ref 1.9–2.7)
MCH RBC QN AUTO: 31.3 PG (ref 26.8–34.3)
MCHC RBC AUTO-ENTMCNC: 32.6 G/DL (ref 31.4–37.4)
MCV RBC AUTO: 96 FL (ref 82–98)
MONOCYTES # BLD AUTO: 0.56 THOUSAND/ÂΜL (ref 0.17–1.22)
MONOCYTES NFR BLD AUTO: 12 % (ref 4–12)
NEUTROPHILS # BLD AUTO: 2.65 THOUSANDS/ÂΜL (ref 1.85–7.62)
NEUTS SEG NFR BLD AUTO: 58 % (ref 43–75)
NRBC BLD AUTO-RTO: 0 /100 WBCS
P AXIS: 50 DEGREES
P AXIS: 61 DEGREES
PLATELET # BLD AUTO: 210 THOUSANDS/UL (ref 149–390)
PMV BLD AUTO: 10.3 FL (ref 8.9–12.7)
POTASSIUM SERPL-SCNC: 4 MMOL/L (ref 3.5–5.3)
PR INTERVAL: 184 MS
PR INTERVAL: 240 MS
QRS AXIS: 55 DEGREES
QRS AXIS: 59 DEGREES
QRSD INTERVAL: 82 MS
QRSD INTERVAL: 88 MS
QT INTERVAL: 314 MS
QT INTERVAL: 344 MS
QTC INTERVAL: 402 MS
QTC INTERVAL: 427 MS
RBC # BLD AUTO: 3.77 MILLION/UL (ref 3.88–5.62)
SODIUM SERPL-SCNC: 138 MMOL/L (ref 135–147)
T WAVE AXIS: 54 DEGREES
T WAVE AXIS: 55 DEGREES
VENTRICULAR RATE: 93 BPM
VENTRICULAR RATE: 99 BPM
WBC # BLD AUTO: 4.6 THOUSAND/UL (ref 4.31–10.16)

## 2023-12-31 PROCEDURE — 83735 ASSAY OF MAGNESIUM: CPT

## 2023-12-31 PROCEDURE — 85025 COMPLETE CBC W/AUTO DIFF WBC: CPT

## 2023-12-31 PROCEDURE — 99239 HOSP IP/OBS DSCHRG MGMT >30: CPT

## 2023-12-31 PROCEDURE — 80048 BASIC METABOLIC PNL TOTAL CA: CPT

## 2023-12-31 RX ORDER — AMOXICILLIN AND CLAVULANATE POTASSIUM 875; 125 MG/1; MG/1
1 TABLET, FILM COATED ORAL EVERY 12 HOURS SCHEDULED
Qty: 14 TABLET | Refills: 0 | Status: SHIPPED | OUTPATIENT
Start: 2023-12-31 | End: 2023-12-31

## 2023-12-31 RX ORDER — AMOXICILLIN AND CLAVULANATE POTASSIUM 875; 125 MG/1; MG/1
1 TABLET, FILM COATED ORAL EVERY 12 HOURS SCHEDULED
Status: DISCONTINUED | OUTPATIENT
Start: 2023-12-31 | End: 2023-12-31 | Stop reason: HOSPADM

## 2023-12-31 RX ORDER — AMOXICILLIN AND CLAVULANATE POTASSIUM 875; 125 MG/1; MG/1
1 TABLET, FILM COATED ORAL EVERY 12 HOURS SCHEDULED
Qty: 14 TABLET | Refills: 0 | Status: SHIPPED | OUTPATIENT
Start: 2023-12-31 | End: 2024-01-07

## 2023-12-31 RX ADMIN — AMOXICILLIN AND CLAVULANATE POTASSIUM 1 TABLET: 875; 125 TABLET, FILM COATED ORAL at 08:49

## 2023-12-31 RX ADMIN — FERROUS SULFATE TAB 325 MG (65 MG ELEMENTAL FE) 325 MG: 325 (65 FE) TAB at 07:26

## 2023-12-31 RX ADMIN — CEFEPIME HYDROCHLORIDE 2000 MG: 2 INJECTION, SOLUTION INTRAVENOUS at 07:27

## 2023-12-31 RX ADMIN — LEVOTHYROXINE SODIUM 50 MCG: 50 TABLET ORAL at 05:05

## 2023-12-31 RX ADMIN — LEVETIRACETAM 500 MG: 500 TABLET, FILM COATED ORAL at 08:00

## 2023-12-31 RX ADMIN — PANTOPRAZOLE SODIUM 40 MG: 40 TABLET, DELAYED RELEASE ORAL at 08:00

## 2023-12-31 RX ADMIN — CARBAMAZEPINE 400 MG: 200 TABLET, EXTENDED RELEASE ORAL at 08:00

## 2023-12-31 RX ADMIN — HEPARIN SODIUM 5000 UNITS: 5000 INJECTION INTRAVENOUS; SUBCUTANEOUS at 05:05

## 2023-12-31 RX ADMIN — ASPIRIN 81 MG: 81 TABLET, COATED ORAL at 08:00

## 2023-12-31 NOTE — ASSESSMENT & PLAN NOTE
Present on admission  Baseline creatinine appears to be around 1.0  Creatinine on admission 1.4  Likely prerenal azotemia in the setting of Covid-19 viral pneumonia and possible bacterial pneumonia  Has been receiving IV fluids at 100 mL/hr, discontinued 12/30  Creatinine at baseline today  Will discharge home on 1 week course of Augmentin for presumed bacterial pneumonia

## 2023-12-31 NOTE — DISCHARGE SUMMARY
[facility]  Discharge- Jose Roberto Herring 1950, 73 y.o. male MRN: [id number]  Unit/Bed#: ICU 06-01 Encounter: [id number]  Primary Care Provider: Nolan Manning MD   Date and time admitted to hospital: 12/29/2023  2:07 PM    * COVID-19  Assessment & Plan  COVID-19 positive on 12/29/2023  Complicated by acute hypoxic respiratory failure  Weaned to room air as of yesterday morning   Received 2 days of baricitinib, remdesivir, and IV Decadron per mild COVID pathway   Patient will be discharged home today with 7-day course of Augmentin for presumed bacterial pneumonia    Sepsis (HCC)  Assessment & Plan  Present on admission  Evidenced by tachycardia, tachypnea in setting of pneumonia  Tachycardia and tachypnea have resolved  Nonlabored respirations on room air at rest and with exertion  Left lower lobe infiltrate noted on chest x-ray  Lactic acid 2.5, repeat 1.3 after fluid resuscitation  Procalcitonin 0.32, trending downward 0.11 on 12/30  Blood cultures x 2 negative after 24 hours  12/9 tested COVID-positive  Patient was receiving cefepime while inpatient, transitioning to Augmentin on discharge today     Seizure disorder (HCC)  Assessment & Plan  Continue home carbamazepine and levetiracetam on discharge    LULA (acute kidney injury) (HCC)  Assessment & Plan  Present on admission  Baseline creatinine appears to be around 1.0  Creatinine on admission 1.4  Likely prerenal azotemia in the setting of Covid-19 viral pneumonia and possible bacterial pneumonia  Has been receiving IV fluids at 100 mL/hr, discontinued 12/30  Creatinine at baseline today  Will discharge home on 1 week course of Augmentin for presumed bacterial pneumonia    Essential hypertension  Assessment & Plan  BP controlled currently   On home hypertensive regimen on discharge    Hypothyroidism  Assessment & Plan  Continue home levothyroxine on discharge    Hypomagnesemia  Assessment & Plan  Resolved with  repletion        Discharging Physician / Practitioner: LEEANN Muñoz  PCP: Nolan Manning MD  Admission Date:   Admission Orders (From admission, onward)       Ordered        12/29/23 1738  INPATIENT ADMISSION  Once                          Discharge Date: 12/31/23    Medical Problems       Resolved Problems  Date Reviewed: 12/31/2023   None         Consultations During Hospital Stay:  None    Procedures Performed:   None    Significant Findings / Test Results:   12/29 blood cultures x 2 negative after 24 hours  12/29 UA negative  12/29 COVID-positive  12/29 lactic acid 2.5, repeat after IV fluids 1.3  12/29 procalcitonin 0.31  12/30 procalcitonin 0.11  12/29 chest x-ray: No opacity left lower lobe  12/29 CT head: Age-indeterminate lacunar infarct, no cute intracranial hemorrhage, mild chronic microangiopathy.  (In reviewing imaging patient did have chronic lacunar infarct noted on MRI imaging from March of this year)  12/29 CT C-spine: No cervical spine fracture or traumatic malalignment  12/29 CT chest abdomen pelvis: Chest noted that she mixed consolidative groundglass opacities left lower lobe differential includes inflammation/infection, aspiration.  Also pulmonary nodules recommend noncontrast follow-up in 3 months    Incidental Findings:   See CT chest abdomen pelvis noted pulmonary nodules-recommend outpatient follow-up in 3 months with CT imaging    Test Results Pending at Discharge (will require follow up):   None     Outpatient Tests Requested:  To be determined by your primary care physician    Complications: None    Reason for Admission: COVID-19      Hospital Course:     Jose Roberto Herring is a 73 y.o. male patient who originally presented to the hospital on 12/29/2023 due to shortness of breath.  Patient reported that he was around some family members that were sick at home with COVID.  Patient tested positive for COVID on arrival.  Met SIRS criteria with tachycardia and tachypnea also noted  "to have an elevated procalcitonin.  CT chest noted left lower lobe consolidation.  He was initiated on IV cefepime, fluid resuscitation and treated per mild COVID pathway.  Initially required 3 L nasal cannula oxygen to maintain saturation greater than 90%.  As of yesterday a.m. he was weaned to room air, but was still short of breath with exertion at that time.  All SIRS criteria have resolved.  Today he is on room air and denies shortness of breath with rest or exertion.  Has been ambulating in the room and denies shortness of breath.  He is anxious to go home today.  Patient will be discharged home on 1 week course of Augmentin antibiotic for presumed bacterial pneumonia.  He will follow-up with his PCP within a week of discharge.        Please see above list of diagnoses and related plan for additional information.     Condition at Discharge: good     Discharge Day Visit / Exam:     Subjective: Patient states he is feeling great today wants to go home  Vitals: Blood Pressure: 119/60 (12/31/23 0816)  Pulse: 72 (12/31/23 0600)  Temperature: 98.6 °F (37 °C) (12/31/23 0816)  Temp Source: Temporal (12/31/23 0816)  Respirations: 16 (12/31/23 0816)  Height: 6' 2\" (188 cm) (12/29/23 2028)  Weight - Scale: 99.2 kg (218 lb 11.1 oz) (12/31/23 0541)  SpO2: 95 % (12/31/23 0600)  Exam:   Physical Exam  Vitals and nursing note reviewed.   Constitutional:       General: He is not in acute distress.     Appearance: He is well-developed. He is obese. He is not ill-appearing.   HENT:      Head: Normocephalic and atraumatic.      Nose: Nose normal.      Mouth/Throat:      Mouth: Mucous membranes are moist.   Cardiovascular:      Rate and Rhythm: Normal rate and regular rhythm.      Pulses: Normal pulses.      Heart sounds: Normal heart sounds. No murmur heard.  Pulmonary:      Effort: Pulmonary effort is normal. No respiratory distress.      Breath sounds: Normal breath sounds. No wheezing or rales.   Abdominal:      General: Bowel " sounds are normal. There is no distension.      Palpations: Abdomen is soft.      Tenderness: There is no abdominal tenderness. There is no guarding.   Musculoskeletal:         General: No swelling. Normal range of motion.   Skin:     General: Skin is warm and dry.      Capillary Refill: Capillary refill takes less than 2 seconds.   Neurological:      General: No focal deficit present.      Mental Status: He is alert and oriented to person, place, and time. Mental status is at baseline.   Psychiatric:         Mood and Affect: Mood normal.         Behavior: Behavior normal.         Thought Content: Thought content normal.         Judgment: Judgment normal.         Discussion with Family: Course of hospitalization including testing and treatments discussed with patient and wife via the phone.  All questions answered to satisfaction.    Discharge instructions/Information to patient and family:   See after visit summary for information provided to patient and family.      Provisions for Follow-Up Care:  See after visit summary for information related to follow-up care and any pertinent home health orders.      Disposition:     Home    For Discharges to [facility] SNF:   Not Applicable to this Patient - Not Applicable to this Patient    Planned Readmission: No     Discharge Statement:  I spent 40 minutes discharging the patient. This time was spent on the day of discharge. I had direct contact with the patient on the day of discharge. Greater than 50% of the total time was spent examining patient, answering all patient questions, arranging and discussing plan of care with patient as well as directly providing post-discharge instructions.  Additional time then spent on discharge activities.    Discharge Medications:  See after visit summary for reconciled discharge medications provided to patient and family.      ** Please Note: This note has been constructed using a voice recognition system **

## 2023-12-31 NOTE — PLAN OF CARE
Problem: PAIN - ADULT  Goal: Verbalizes/displays adequate comfort level or baseline comfort level  Description: Interventions:  - Encourage patient to monitor pain and request assistance  - Assess pain using appropriate pain scale  - Administer analgesics based on type and severity of pain and evaluate response  - Implement non-pharmacological measures as appropriate and evaluate response  - Consider cultural and social influences on pain and pain management  - Notify physician/advanced practitioner if interventions unsuccessful or patient reports new pain  Outcome: Progressing     Problem: INFECTION - ADULT  Goal: Absence or prevention of progression during hospitalization  Description: INTERVENTIONS:  - Assess and monitor for signs and symptoms of infection  - Monitor lab/diagnostic results  - Monitor all insertion sites, i.e. indwelling lines, tubes, and drains  - Monitor endotracheal if appropriate and nasal secretions for changes in amount and color  - [place] appropriate cooling/warming therapies per order  - Administer medications as ordered  - Instruct and encourage patient and family to use good hand hygiene technique  - Identify and instruct in appropriate isolation precautions for identified infection/condition  Outcome: Progressing  Goal: Absence of fever/infection during neutropenic period  Description: INTERVENTIONS:  - Monitor WBC    Outcome: Progressing

## 2023-12-31 NOTE — ASSESSMENT & PLAN NOTE
COVID-19 positive on 12/29/2023  Complicated by acute hypoxic respiratory failure  Weaned to room air as of yesterday morning   Received 2 days of baricitinib, remdesivir, and IV Decadron per mild COVID pathway   Patient will be discharged home today with 7-day course of Augmentin for presumed bacterial pneumonia

## 2023-12-31 NOTE — ASSESSMENT & PLAN NOTE
Present on admission  Evidenced by tachycardia, tachypnea in setting of pneumonia  Tachycardia and tachypnea have resolved  Nonlabored respirations on room air at rest and with exertion  Left lower lobe infiltrate noted on chest x-ray  Lactic acid 2.5, repeat 1.3 after fluid resuscitation  Procalcitonin 0.32, trending downward 0.11 on 12/30  Blood cultures x 2 negative after 24 hours  12/9 tested COVID-positive  Patient was receiving cefepime while inpatient, transitioning to Augmentin on discharge today

## 2023-12-31 NOTE — DISCHARGE INSTR - AVS FIRST PAGE
Please follow-up with your primary care physician within a week of discharge  You are being prescribed Augmentin antibiotic for 1 week for your bacterial pneumonia.  Please take as directed.  There have been no other changes to your prehospital medications

## 2024-01-03 LAB
BACTERIA BLD CULT: NORMAL
BACTERIA BLD CULT: NORMAL

## 2024-01-09 ENCOUNTER — HOSPITAL ENCOUNTER (OUTPATIENT)
Dept: CT IMAGING | Facility: HOSPITAL | Age: 74
Discharge: HOME | End: 2024-01-09
Attending: UROLOGY
Payer: MEDICARE

## 2024-01-09 DIAGNOSIS — C67.6 MALIGNANT NEOPLASM OF URETERIC ORIFICE (HCC): ICD-10-CM

## 2024-01-09 PROCEDURE — G1004 CDSM NDSC: HCPCS

## 2024-01-09 PROCEDURE — 74178 CT ABD&PLV WO CNTR FLWD CNTR: CPT

## 2024-01-09 RX ADMIN — IOHEXOL 100 ML: 350 INJECTION, SOLUTION INTRAVENOUS at 14:13

## 2024-02-01 NOTE — PRE-PROCEDURE INSTRUCTIONS
Pre-Surgery Instructions:   Medication Instructions    acetaminophen (TYLENOL) 325 mg tablet Uses PRN- OK to take day of surgery    aspirin (ECOTRIN LOW STRENGTH) 81 mg EC tablet Instructions provided by MD    carBAMazepine (TEGretol XR) 400 mg 12 hr tablet Take day of surgery.    Cyanocobalamin (VITAMIN B-12 PO) Stop taking 7 days prior to surgery.    ferrous sulfate 324 (65 Fe) mg Stop taking 7 days prior to surgery.    levETIRAcetam (KEPPRA) 500 mg tablet Take day of surgery.    levothyroxine 50 mcg alternating with 25mcg tablet Take day of surgery.    lisinopril (ZESTRIL) 5 mg tablet Hold day of surgery.    Omega-3 Fatty Acids (FISH OIL PO) Instructions provided by MD         rosuvastatin (CRESTOR) 20 MG tablet Take night before surgery     Pt verbalizes understanding of the following:    Please reference your “My Surgical Experience Booklet” for additional information to prepare for your upcoming surgery.      - DO NOT EAT OR DRINK ANYTHING after midnight on the evening before your procedure including coffee, tea, gum or hard candy.    - ONLY SIPS OF WATER with your medications are allowed on the morning of your procedure.  - Avoid OTC non-directed Vit/ Suppl/ Herbals 7 days prior to surgery to ensure no drug interactions with perioperative surgical/ anesthetic meds  - Avoid NSAIDs 3 days prior. Tylenol is ok to take as needed.   - Avoid ASA containing products 5 days prior, unless otherwise instructed by your provider   - Continue statin medication up through and including the day of surgery  - Bring a list of meds you take at home with your last dose noted    - Follow the pre surgery showering instructions as listed in the “My Surgical Experience Booklet” or otherwise provided by your surgeon's office.  - Bathing instructions, will use dial  - No lotions, powders, sprays, deodorant, cologne, jewelry  - Do not use a blade to shave the surgical area 1 week before surgery. It is ok to use clean electric clippers  up to 24 hours before surgery. Do not shave any body parts with a razor within 24hrs.    - For outpatient surgery, arrange for someone to drive you home after the procedure & stay with you until the next morning. Visitor guidelines discussed.   - Bring insurance cards & photo id    - Bring a case for your glasses.  - Leave all valuables such as credit cards, money & jewelry at home  - Wear causal clothing that is easy to take on and off. Consider your type of surgery.    - Notify surgeon if you develop any new illnesses, exposure, develop a rash/ open wounds or have additional questions prior to your surgery.    - Did the surgeon's office give you any other special instructions? No  - Did surgeon require any clearances? PCP    You will receive a call one business day prior to surgery with an arrival time and hospital directions. If your surgery is scheduled on a Monday, the hospital will be calling you on the Friday prior to your surgery.

## 2024-02-11 ENCOUNTER — ANESTHESIA EVENT (OUTPATIENT)
Dept: PERIOP | Facility: HOSPITAL | Age: 74
End: 2024-02-11
Payer: MEDICARE

## 2024-02-12 ENCOUNTER — HOSPITAL ENCOUNTER (OUTPATIENT)
Facility: HOSPITAL | Age: 74
Setting detail: OUTPATIENT SURGERY
Discharge: HOME | End: 2024-02-13
Attending: UROLOGY | Admitting: UROLOGY
Payer: MEDICARE

## 2024-02-12 ENCOUNTER — ANESTHESIA (OUTPATIENT)
Dept: PERIOP | Facility: HOSPITAL | Age: 74
End: 2024-02-12
Payer: MEDICARE

## 2024-02-12 DIAGNOSIS — N35.919 URETHRAL STRICTURE IN MALE: ICD-10-CM

## 2024-02-12 DIAGNOSIS — D41.4 NEOPLASM OF UNCERTAIN BEHAVIOR OF BLADDER: ICD-10-CM

## 2024-02-12 LAB
BILIRUB UR QL STRIP: NEGATIVE
CLARITY UR: CLEAR
COLOR UR: YELLOW
GLUCOSE UR STRIP-MCNC: NEGATIVE MG/DL
HGB UR QL STRIP.AUTO: NEGATIVE
KETONES UR STRIP-MCNC: NEGATIVE MG/DL
LEUKOCYTE ESTERASE UR QL STRIP: NEGATIVE
NITRITE UR QL STRIP: NEGATIVE
PH UR STRIP.AUTO: 6 [PH]
PROT UR STRIP-MCNC: NEGATIVE MG/DL
SP GR UR STRIP.AUTO: 1.02
UROBILINOGEN UR QL STRIP.AUTO: 0.2 E.U./DL

## 2024-02-12 PROCEDURE — 87186 SC STD MICRODIL/AGAR DIL: CPT | Performed by: UROLOGY

## 2024-02-12 PROCEDURE — 88307 TISSUE EXAM BY PATHOLOGIST: CPT | Performed by: PATHOLOGY

## 2024-02-12 PROCEDURE — C1769 GUIDE WIRE: HCPCS | Performed by: UROLOGY

## 2024-02-12 PROCEDURE — 87147 CULTURE TYPE IMMUNOLOGIC: CPT | Performed by: UROLOGY

## 2024-02-12 PROCEDURE — 81003 URINALYSIS AUTO W/O SCOPE: CPT | Performed by: UROLOGY

## 2024-02-12 PROCEDURE — C1758 CATHETER, URETERAL: HCPCS | Performed by: UROLOGY

## 2024-02-12 PROCEDURE — 87086 URINE CULTURE/COLONY COUNT: CPT | Performed by: UROLOGY

## 2024-02-12 RX ORDER — CARBAMAZEPINE 200 MG/1
400 TABLET, EXTENDED RELEASE ORAL 3 TIMES DAILY
Status: DISCONTINUED | OUTPATIENT
Start: 2024-02-12 | End: 2024-02-13 | Stop reason: HOSPADM

## 2024-02-12 RX ORDER — HYDROMORPHONE HCL/PF 1 MG/ML
0.5 SYRINGE (ML) INJECTION
Status: DISCONTINUED | OUTPATIENT
Start: 2024-02-12 | End: 2024-02-12

## 2024-02-12 RX ORDER — SODIUM CHLORIDE, SODIUM LACTATE, POTASSIUM CHLORIDE, CALCIUM CHLORIDE 600; 310; 30; 20 MG/100ML; MG/100ML; MG/100ML; MG/100ML
INJECTION, SOLUTION INTRAVENOUS CONTINUOUS PRN
Status: DISCONTINUED | OUTPATIENT
Start: 2024-02-12 | End: 2024-02-12

## 2024-02-12 RX ORDER — ATORVASTATIN CALCIUM 40 MG/1
40 TABLET, FILM COATED ORAL DAILY
Status: DISCONTINUED | OUTPATIENT
Start: 2024-02-12 | End: 2024-02-13 | Stop reason: HOSPADM

## 2024-02-12 RX ORDER — ACETAMINOPHEN 325 MG/1
650 TABLET ORAL EVERY 6 HOURS PRN
Status: DISCONTINUED | OUTPATIENT
Start: 2024-02-12 | End: 2024-02-13 | Stop reason: HOSPADM

## 2024-02-12 RX ORDER — SODIUM CHLORIDE, SODIUM LACTATE, POTASSIUM CHLORIDE, CALCIUM CHLORIDE 600; 310; 30; 20 MG/100ML; MG/100ML; MG/100ML; MG/100ML
75 INJECTION, SOLUTION INTRAVENOUS CONTINUOUS
Status: DISCONTINUED | OUTPATIENT
Start: 2024-02-12 | End: 2024-02-13

## 2024-02-12 RX ORDER — SODIUM CHLORIDE, SODIUM LACTATE, POTASSIUM CHLORIDE, CALCIUM CHLORIDE 600; 310; 30; 20 MG/100ML; MG/100ML; MG/100ML; MG/100ML
50 INJECTION, SOLUTION INTRAVENOUS CONTINUOUS
Status: DISCONTINUED | OUTPATIENT
Start: 2024-02-12 | End: 2024-02-13

## 2024-02-12 RX ORDER — LISINOPRIL 5 MG/1
5 TABLET ORAL DAILY
Status: DISCONTINUED | OUTPATIENT
Start: 2024-02-12 | End: 2024-02-13 | Stop reason: HOSPADM

## 2024-02-12 RX ORDER — LEVOTHYROXINE SODIUM 0.03 MG/1
25 TABLET ORAL DAILY
Status: DISCONTINUED | OUTPATIENT
Start: 2024-02-12 | End: 2024-02-12

## 2024-02-12 RX ORDER — LEVETIRACETAM 500 MG/1
500 TABLET ORAL 2 TIMES DAILY
Status: DISCONTINUED | OUTPATIENT
Start: 2024-02-12 | End: 2024-02-13 | Stop reason: HOSPADM

## 2024-02-12 RX ORDER — FENTANYL CITRATE 50 UG/ML
INJECTION, SOLUTION INTRAMUSCULAR; INTRAVENOUS AS NEEDED
Status: DISCONTINUED | OUTPATIENT
Start: 2024-02-12 | End: 2024-02-12

## 2024-02-12 RX ORDER — ONDANSETRON 2 MG/ML
INJECTION INTRAMUSCULAR; INTRAVENOUS AS NEEDED
Status: DISCONTINUED | OUTPATIENT
Start: 2024-02-12 | End: 2024-02-12

## 2024-02-12 RX ORDER — MAGNESIUM HYDROXIDE 1200 MG/15ML
LIQUID ORAL AS NEEDED
Status: DISCONTINUED | OUTPATIENT
Start: 2024-02-12 | End: 2024-02-12 | Stop reason: HOSPADM

## 2024-02-12 RX ORDER — LIDOCAINE HCL/PF 100 MG/5ML
SYRINGE (ML) INJECTION AS NEEDED
Status: DISCONTINUED | OUTPATIENT
Start: 2024-02-12 | End: 2024-02-12

## 2024-02-12 RX ORDER — CEFPODOXIME PROXETIL 200 MG/1
200 TABLET, FILM COATED ORAL 2 TIMES DAILY WITH MEALS
Status: DISCONTINUED | OUTPATIENT
Start: 2024-02-13 | End: 2024-02-13 | Stop reason: HOSPADM

## 2024-02-12 RX ORDER — LIDOCAINE HYDROCHLORIDE 10 MG/ML
0.5 INJECTION, SOLUTION EPIDURAL; INFILTRATION; INTRACAUDAL; PERINEURAL ONCE AS NEEDED
Status: DISCONTINUED | OUTPATIENT
Start: 2024-02-12 | End: 2024-02-12

## 2024-02-12 RX ORDER — GLYCOPYRROLATE 0.2 MG/ML
INJECTION INTRAMUSCULAR; INTRAVENOUS AS NEEDED
Status: DISCONTINUED | OUTPATIENT
Start: 2024-02-12 | End: 2024-02-12

## 2024-02-12 RX ORDER — PROPOFOL 10 MG/ML
INJECTION, EMULSION INTRAVENOUS AS NEEDED
Status: DISCONTINUED | OUTPATIENT
Start: 2024-02-12 | End: 2024-02-12

## 2024-02-12 RX ORDER — ONDANSETRON 2 MG/ML
4 INJECTION INTRAMUSCULAR; INTRAVENOUS ONCE AS NEEDED
Status: DISCONTINUED | OUTPATIENT
Start: 2024-02-12 | End: 2024-02-12

## 2024-02-12 RX ORDER — LEVOFLOXACIN 5 MG/ML
500 INJECTION, SOLUTION INTRAVENOUS ONCE
Status: COMPLETED | OUTPATIENT
Start: 2024-02-12 | End: 2024-02-12

## 2024-02-12 RX ORDER — LEVOTHYROXINE SODIUM 0.07 MG/1
37.5 TABLET ORAL DAILY
Status: DISCONTINUED | OUTPATIENT
Start: 2024-02-12 | End: 2024-02-13 | Stop reason: HOSPADM

## 2024-02-12 RX ORDER — PANTOPRAZOLE SODIUM 40 MG/1
40 TABLET, DELAYED RELEASE ORAL
Status: DISCONTINUED | OUTPATIENT
Start: 2024-02-12 | End: 2024-02-13 | Stop reason: HOSPADM

## 2024-02-12 RX ADMIN — LISINOPRIL 5 MG: 5 TABLET ORAL at 13:43

## 2024-02-12 RX ADMIN — LIDOCAINE HYDROCHLORIDE 80 MG: 20 INJECTION INTRAVENOUS at 10:16

## 2024-02-12 RX ADMIN — GLYCOPYRROLATE 0.2 MG: 0.2 INJECTION INTRAMUSCULAR; INTRAVENOUS at 10:16

## 2024-02-12 RX ADMIN — LEVETIRACETAM 500 MG: 500 TABLET, FILM COATED ORAL at 17:14

## 2024-02-12 RX ADMIN — FENTANYL CITRATE 25 MCG: 50 INJECTION, SOLUTION INTRAMUSCULAR; INTRAVENOUS at 11:39

## 2024-02-12 RX ADMIN — FENTANYL CITRATE 25 MCG: 50 INJECTION, SOLUTION INTRAMUSCULAR; INTRAVENOUS at 10:41

## 2024-02-12 RX ADMIN — PANTOPRAZOLE SODIUM 40 MG: 40 TABLET, DELAYED RELEASE ORAL at 17:14

## 2024-02-12 RX ADMIN — FENTANYL CITRATE 50 MCG: 50 INJECTION, SOLUTION INTRAMUSCULAR; INTRAVENOUS at 12:01

## 2024-02-12 RX ADMIN — CARBAMAZEPINE 400 MG: 200 TABLET, EXTENDED RELEASE ORAL at 17:15

## 2024-02-12 RX ADMIN — ATORVASTATIN CALCIUM 40 MG: 40 TABLET, FILM COATED ORAL at 13:43

## 2024-02-12 RX ADMIN — CARBAMAZEPINE 400 MG: 200 TABLET, EXTENDED RELEASE ORAL at 20:20

## 2024-02-12 RX ADMIN — SODIUM CHLORIDE, SODIUM LACTATE, POTASSIUM CHLORIDE, AND CALCIUM CHLORIDE 75 ML/HR: .6; .31; .03; .02 INJECTION, SOLUTION INTRAVENOUS at 13:44

## 2024-02-12 RX ADMIN — LEVOFLOXACIN: 500 INJECTION, SOLUTION INTRAVENOUS at 10:17

## 2024-02-12 RX ADMIN — FENTANYL CITRATE 25 MCG: 50 INJECTION, SOLUTION INTRAMUSCULAR; INTRAVENOUS at 11:24

## 2024-02-12 RX ADMIN — PROPOFOL 150 MG: 10 INJECTION, EMULSION INTRAVENOUS at 10:16

## 2024-02-12 RX ADMIN — LEVOTHYROXINE SODIUM 37.5 MCG: 75 TABLET ORAL at 15:10

## 2024-02-12 RX ADMIN — FENTANYL CITRATE 50 MCG: 50 INJECTION, SOLUTION INTRAMUSCULAR; INTRAVENOUS at 10:16

## 2024-02-12 RX ADMIN — FENTANYL CITRATE 25 MCG: 50 INJECTION, SOLUTION INTRAMUSCULAR; INTRAVENOUS at 10:56

## 2024-02-12 RX ADMIN — SODIUM CHLORIDE, SODIUM LACTATE, POTASSIUM CHLORIDE, AND CALCIUM CHLORIDE: .6; .31; .03; .02 INJECTION, SOLUTION INTRAVENOUS at 10:15

## 2024-02-12 RX ADMIN — SODIUM CHLORIDE, SODIUM LACTATE, POTASSIUM CHLORIDE, AND CALCIUM CHLORIDE 50 ML/HR: .6; .31; .03; .02 INJECTION, SOLUTION INTRAVENOUS at 09:10

## 2024-02-12 RX ADMIN — ACETAMINOPHEN 650 MG: 325 TABLET ORAL at 15:12

## 2024-02-12 RX ADMIN — ONDANSETRON 4 MG: 2 INJECTION INTRAMUSCULAR; INTRAVENOUS at 10:16

## 2024-02-12 NOTE — PLAN OF CARE

## 2024-02-12 NOTE — INTERVAL H&P NOTE
H&P reviewed. After examining the patient I find no changes in the patients condition since the H&P had been written.    Vitals:    02/12/24 0856   BP: 141/70   Pulse: 84   Resp: 16   Temp: 97.6 °F (36.4 °C)   SpO2: 94%

## 2024-02-12 NOTE — ANESTHESIA POSTPROCEDURE EVALUATION
Post-Op Assessment Note    CV Status:  Stable  Pain Score: 0    Pain management: adequate       Mental Status:  Alert and awake   Hydration Status:  Euvolemic   PONV Controlled:  Controlled   Airway Patency:  Patent     Post Op Vitals Reviewed: Yes    No anethesia notable event occurred.    Staff: CRNA               BP   118/67   Temp 97.5 °F (36.4 °C) (02/12/24 1215)    Pulse 78   Resp 16 (02/12/24 1215)    SpO2 98

## 2024-02-12 NOTE — OP NOTE
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    Operative Findings:  Extensive urethral narrowing of the anterior urethra but mostly of the bulbous urethra secondary to stricture which has been periodically dilated.  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  With resection, the papillary tumor was shown to be more extensive slightly proximal to the ureteral orifice.  Greatest diameter of resection 2-3 cm.    Complications:   None    Procedure and Technique:  The patient was properly identified in the operating room and after adequate general anesthesia was placed in the  lithotomy position.  The lower abdomen and genitalia were prepped and draped in the usual fashion.  17 [place] cystoscope was inserted per urethra and was able to be advanced into the bladder.  There was diffuse narrowing of the anterior urethra becoming especially prominent in the bulbous urethra.  The prostate was mild to moderately enlarged and unremarkable.  The bladder was entered and urine was drained and sent for urinalysis and culture.  Cystoscopy was performed with 30 degree and 70 degree lenses.  The bladder was mildly trabeculated with a few small diverticula.  The left ureteral orifice was normal.  The right ureteral orifice appeared as above.  No other suspicious lesions were seen.  A Solo guidewire was passed through the cystoscope into the bladder.  The urethra was then sequentially dilated under direct vision with cystoscope was beginning next with the 21 Tanzanian scope and then the 22 Tanzanian scope and finally the 25 Tanzanian scope.  Each advanced into the bladder with some resistance.  However, the 27 [place] resectoscope with visual obturator could not be passed beyond the mid urethra.  Therefore a Stortz urethrotome with a straight cold blade attachment was utilized to perform direct vision internal urethrotomy at the 12 o'clock position within the bulbous urethra and slightly into the more anterior urethra.  The external sphincter was well proximal to the area of incision.  The resectoscope then was able to be passed into the bladder with some resistance through the bulbous urethra.  The bladder tumor within the right ureteral orifice was then resected with.  Cutting current.  However, it was now apparent that the lumen was taken up by further papillary tumor below this area.  Therefore the orifice was continued to be resected more deeply with 2 more passes.  The resected ureteral orifice was seen and no residual suspicious lesion was seen.  The surrounding mucosa and a few bleeding points were  carefully fulgurated to avoid the orifice.  Tumor tissue was irrigated free with Ellik evacuator and sent as specimen.  Hemostasis was confirmed.  The guidewire was passed through the scope as the scope was removed.  A 20 [place] tip Miguel was passed over the wire and the wire was removed as the balloon was filled with 10 cc of water.  The catheter irrigated well with saline and water with clear drainage.  The patient tolerated the procedure well and left the operating room in good condition.   I was present for the entire procedure.    Patient Disposition:  PACU         SIGNATURE: Rasheed Motley MD  DATE: February 12, 2024  TIME: 12:13 PM

## 2024-02-12 NOTE — ANESTHESIA PREPROCEDURE EVALUATION
Procedure:  CYSTOSCOPY; DVIU; TURBT (Bladder)  POSSIBLE RETROGRADE AND URETEROSCOPY (Right: Ureter)    Relevant Problems   CARDIO   (+) Essential hypertension   (+) Hyperlipidemia      ENDO   (+) Hypothyroidism      GI/HEPATIC   (+) Stomach ulcer      HEMATOLOGY   (+) Anemia      NEURO/PSYCH   (+) Seizure disorder (HCC)      Other   (+) History of bladder cancer        Physical Exam    Airway    Mallampati score: II  TM Distance: >3 FB  Neck ROM: full     Dental   Comment: edentulous     Cardiovascular      Pulmonary      Other Findings    12/29/23 TTE: Normal LV systolic function. Mildly reduced RV function. No significant valvular disease. Moderately elevated RV systolic pressure.    Anesthesia Plan  ASA Score- 3     Anesthesia Type- IV sedation with anesthesia with ASA Monitors.         Additional Monitors:     Airway Plan:     Comment: I discussed the risks and benefits of IV sedation anesthesia including the possibility of the need to convert to general anesthesia and the potential risk of awareness.       Plan Factors-Exercise tolerance (METS): >4 METS.    Chart reviewed.   Existing labs reviewed.     Patient is not a current smoker.  Patient did not smoke on day of surgery.            Induction- intravenous.    Postoperative Plan-     Informed Consent- Anesthetic plan and risks discussed with patient.

## 2024-02-13 VITALS
DIASTOLIC BLOOD PRESSURE: 64 MMHG | RESPIRATION RATE: 18 BRPM | SYSTOLIC BLOOD PRESSURE: 104 MMHG | WEIGHT: 218 LBS | HEIGHT: 74 IN | OXYGEN SATURATION: 90 % | HEART RATE: 82 BPM | BODY MASS INDEX: 27.98 KG/M2 | TEMPERATURE: 98.3 F

## 2024-02-13 PROBLEM — C67.6 MALIGNANT NEOPLASM OF URETERIC ORIFICE (HCC): Status: ACTIVE | Noted: 2024-02-13

## 2024-02-13 LAB
ANION GAP SERPL CALCULATED.3IONS-SCNC: 8 MMOL/L
BUN SERPL-MCNC: 16 MG/DL (ref 5–25)
CALCIUM SERPL-MCNC: 8.5 MG/DL (ref 8.4–10.2)
CHLORIDE SERPL-SCNC: 102 MMOL/L (ref 96–108)
CO2 SERPL-SCNC: 27 MMOL/L (ref 21–32)
CREAT SERPL-MCNC: 0.95 MG/DL (ref 0.6–1.3)
ERYTHROCYTE [DISTWIDTH] IN BLOOD BY AUTOMATED COUNT: 12.9 % (ref 11.6–15.1)
GFR SERPL CREATININE-BSD FRML MDRD: 79 ML/MIN/1.73SQ M
GLUCOSE SERPL-MCNC: 96 MG/DL (ref 65–140)
HCT VFR BLD AUTO: 35.3 % (ref 36.5–49.3)
HGB BLD-MCNC: 11.8 G/DL (ref 12–17)
MCH RBC QN AUTO: 32.5 PG (ref 26.8–34.3)
MCHC RBC AUTO-ENTMCNC: 33.4 G/DL (ref 31.4–37.4)
MCV RBC AUTO: 97 FL (ref 82–98)
PLATELET # BLD AUTO: 238 THOUSANDS/UL (ref 149–390)
PMV BLD AUTO: 9.8 FL (ref 8.9–12.7)
POTASSIUM SERPL-SCNC: 4.5 MMOL/L (ref 3.5–5.3)
RBC # BLD AUTO: 3.63 MILLION/UL (ref 3.88–5.62)
SODIUM SERPL-SCNC: 137 MMOL/L (ref 135–147)
WBC # BLD AUTO: 10.63 THOUSAND/UL (ref 4.31–10.16)

## 2024-02-13 PROCEDURE — 80048 BASIC METABOLIC PNL TOTAL CA: CPT | Performed by: UROLOGY

## 2024-02-13 PROCEDURE — 85027 COMPLETE CBC AUTOMATED: CPT | Performed by: UROLOGY

## 2024-02-13 RX ADMIN — CEFPODOXIME PROXETIL 200 MG: 200 TABLET, FILM COATED ORAL at 10:53

## 2024-02-13 RX ADMIN — PANTOPRAZOLE SODIUM 40 MG: 40 TABLET, DELAYED RELEASE ORAL at 08:38

## 2024-02-13 RX ADMIN — LEVETIRACETAM 500 MG: 500 TABLET, FILM COATED ORAL at 08:38

## 2024-02-13 RX ADMIN — ATORVASTATIN CALCIUM 40 MG: 40 TABLET, FILM COATED ORAL at 08:38

## 2024-02-13 RX ADMIN — CARBAMAZEPINE 400 MG: 200 TABLET, EXTENDED RELEASE ORAL at 08:40

## 2024-02-13 RX ADMIN — LEVOTHYROXINE SODIUM 37.5 MCG: 75 TABLET ORAL at 08:38

## 2024-02-13 RX ADMIN — SODIUM CHLORIDE, SODIUM LACTATE, POTASSIUM CHLORIDE, AND CALCIUM CHLORIDE 75 ML/HR: .6; .31; .03; .02 INJECTION, SOLUTION INTRAVENOUS at 00:38

## 2024-02-13 NOTE — NURSING NOTE
Went over discharge info and yuen care with pt and pt wife. All questions answered. Pt was given a yuen leg bag and extra regular yuen bag. PCA escorted pt down to the main lobby via wheel chair.

## 2024-02-13 NOTE — CASE MANAGEMENT
Case Management Assessment & Discharge Planning Note    Patient name Jose Roberto Herring  Location /-01 MRN [id number]  : 1950 Date 2024       Current Admission Date: 2024  Current Admission Diagnosis:Malignant neoplasm of ureteric orifice (HCC)   Patient Active Problem List    Diagnosis Date Noted    Malignant neoplasm of ureteric orifice (HCC) 2024    Hypomagnesemia 2023    COVID-19 2023    Sepsis (HCC) 2023    Stomach ulcer 2023    Seizure disorder (HCC) 2023    Essential hypertension 2023    Hypothyroidism 2023    Hyperlipidemia 2023    Possible upper GI bleed 2023    Anemia 2023    LULA (acute kidney injury) (HCC) 2023    History of bladder cancer 2018    Malignant neoplasm of overlapping sites of bladder (HCC) 2018    History of renal cell cancer 2018      LOS (days): 0  Geometric Mean LOS (GMLOS) (days):   Days to GMLOS:     OBJECTIVE:              Current admission status: Outpatient Surgery       Preferred Pharmacy:   [id number] - [place], [place] - [address]  [address] [facility] [address]  Phone: [phone] Fax: [phone]    [place], [place] - [address]  [address] [address]  Phone: [phone] Fax: [phone]    [id number] [place], [place] - [address]  [address] [address]  Phone: [phone] Fax: [phone]    Primary Care Provider: Nolan Manning MD    Primary Insurance: MEDICARE  Secondary Insurance: [facility]    ASSESSMENT:  Active Health Care Proxies    There are no active Health Care Proxies on file.       Advance Directives  Does patient have a Health Care POA?: Yes  Does patient have Advance Directives?: Yes  Advance Directives: Living will, Power of  for health care  Primary Contact: Kenyatta Nevaeh         Readmission Root Cause  30 Day  Readmission: No    Patient Information  Admitted from:: Home  Mental Status: Alert  During Assessment patient was accompanied by: Not accompanied during assessment  Assessment information provided by:: Patient  Primary Caregiver: Self  Support Systems: Spouse/significant other  County of Residence: [place]  What [facility] do you live in?: [place]  Home entry access options. Select all that apply.: Garage (5 steps w/ rsiling to het into the main floor)  Type of Current Residence: Bi-level  Upon entering residence, is there a bedroom on the main floor (no further steps)?: No  A bedroom is located on the following floor levels of residence (select all that apply):: 2nd Floor  Upon entering residence, is there a bathroom on the main floor (no further steps)?: Yes  Number of steps to 2nd floor from main floor: 5  Living Arrangements: Lives w/ Spouse/significant other  Is patient a ?: Yes  Is [facility] with [facility] ( [employer])?: Yes  Is patient service connected?: Yes ([facility] [employer])    Activities of Daily Living Prior to Admission  Functional Status: Independent  Completes ADLs independently?: Yes  Ambulates independently?: Yes  Does patient currently own DME?: Yes  What DME does the patient currently own?: Straight [employer]ane, Other (Comment) (pt. has built in shower handle)  Does patient have a history of Outpatient Therapy (PT/OT)?: No  Does the patient have a history of Short-Term Rehab?: No  Does patient have a history of HHC?: No  Does patient currently have HHC?: No         Patient Information Continued  Income Source: Pension/nursing home  Does patient have prescription coverage?: Yes  Does patient receive dialysis treatments?: No  Does patient have a history of substance abuse?: No  Does patient have a history of Mental Health Diagnosis?: No         Means of Transportation  Means of Transport to Appts:: Drives Self      Social Determinants of Health (SDOH)      Flowsheet Row Most Recent  Value   Housing Stability    In the last 12 months, was there a time when you were not able to pay the mortgage or rent on time? N   In the last 12 months, how many places have you lived? 1   In the last 12 months, was there a time when you did not have a steady place to sleep or slept in a shelter (including now)? N   Transportation Needs    In the past 12 months, has lack of transportation kept you from medical appointments or from getting medications? no   In the past 12 months, has lack of transportation kept you from meetings, work, or from getting things needed for daily living? No   Food Insecurity    Within the past 12 months, you worried that your food would run out before you got the money to buy more. Never true   Within the past 12 months, the food you bought just didn't last and you didn't have money to get more. Never true   Utilities    In the past 12 months has the electric, gas, oil, or water company threatened to shut off services in your home? No            DISCHARGE DETAILS:    Discharge planning discussed with:: pt.at bedside,wife was calledat 11:13 AM# [phone],  Freedom of Choice: Yes  Comments - Freedom of Choice: Pt. and wife denies DC needs, pt decline home care;pt. and family in agreeement  and dischange plan  CM contacted family/caregiver?: Yes  Were Treatment Team discharge recommendations reviewed with patient/caregiver?: Yes  Did patient/caregiver verbalize understanding of patient care needs?: Yes  Were patient/caregiver advised of the risks associated with not following Treatment Team discharge recommendations?: Yes    Contacts  Patient Contacts: Kenyatta Herring  Contact Method: Phone  Phone Number: [phone]  Reason/Outcome: Discharge Planning    Requested Home Health Care         Is the patient interested in HHC at discharge?: No    DME Referral Provided  Referral made for DME?: No    Other Referral/Resources/Interventions Provided:  Referral Comments: pt. declined home care;  he stated that he had yuen in the past and knowns how to care for it,RN will give the pt. a leg bag upon dc and will do fully teaching.    Would you like to participate in our [facility] service program?  : No - Declined    Treatment Team Recommendation: Home (home with spouse and out pt. follow up- wife)  Discharge Destination Plan:: Home (home w/wife with out. follow up- wife)  Transport at Discharge : Automobile, Family                       Accompanied by: Family member           Family notified:: wife was called

## 2024-02-13 NOTE — PLAN OF CARE
Problem: PAIN - ADULT  Goal: Verbalizes/displays adequate comfort level or baseline comfort level  Description: Interventions:  - Encourage patient to monitor pain and request assistance  - Assess pain using appropriate pain scale  - Administer analgesics based on type and severity of pain and evaluate response  - Implement non-pharmacological measures as appropriate and evaluate response  - Consider cultural and social influences on pain and pain management  - Notify physician/advanced practitioner if interventions unsuccessful or patient reports new pain  2/13/2024 1105 by Yanique Camarillo  Outcome: Progressing  2/13/2024 1105 by Yanique Camarillo  Outcome: Progressing   Pt has no reports of pain

## 2024-02-13 NOTE — DISCHARGE SUMMARY
Discharge Summary - Jose Roberto Herring 73 y.o. 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 Labs stable. No complaints.    Significant Findings, Care, Treatment and Services Provided: extensive urethral stricture and papillary tumor of ureteral orifice s/p DVIU and TURBT with post op yuen    Complications: none    Discharge Diagnosis: urethral stricture and tumor of right ureteral orifice    Medical Problems       Resolved Problems  Date Reviewed: 12/31/2023   None         Condition at Discharge: good         Discharge instructions/Information to patient and family:   See after visit summary for information provided to patient and family.      Provisions for Follow-Up Care:  See after visit summary for information related to follow-up care and any pertinent home health orders.      PCP: Nolan Manning MD    Disposition: Home    Planned Readmission: No      Discharge Statement   I spent 30 minutes discharging the patient. This time was spent on the day of discharge. I had direct contact with the patient on the day of discharge. Additional documentation is required if more than 30 minutes were spent on discharge.     Discharge Medications:  See after visit summary for reconciled discharge medications provided to patient and family.

## 2024-02-13 NOTE — PLAN OF CARE
Problem: PAIN - ADULT  Goal: Verbalizes/displays adequate comfort level or baseline comfort level  Description: Interventions:  - Encourage patient to monitor pain and request assistance  - Assess pain using appropriate pain scale  - Administer analgesics based on type and severity of pain and evaluate response  - Implement non-pharmacological measures as appropriate and evaluate response  - Consider cultural and social influences on pain and pain management  - Notify physician/advanced practitioner if interventions unsuccessful or patient reports new pain  Outcome: Progressing     Problem: INFECTION - ADULT  Goal: Absence or prevention of progression during hospitalization  Description: INTERVENTIONS:  - Assess and monitor for signs and symptoms of infection  - Monitor lab/diagnostic results  - Monitor all insertion sites, i.e. indwelling lines, tubes, and drains  - Monitor endotracheal if appropriate and nasal secretions for changes in amount and color  - Equality appropriate cooling/warming therapies per order  - Administer medications as ordered  - Instruct and encourage patient and family to use good hand hygiene technique  - Identify and instruct in appropriate isolation precautions for identified infection/condition  Outcome: Progressing  Goal: Absence of fever/infection during neutropenic period  Description: INTERVENTIONS:  - Monitor WBC    Outcome: Progressing     Problem: SAFETY ADULT  Goal: Patient will remain free of falls  Description: INTERVENTIONS:  - Educate patient/family on patient safety including physical limitations  - Instruct patient to call for assistance with activity   - Consult OT/PT to assist with strengthening/mobility   - Keep Call bell within reach  - Keep bed low and locked with side rails adjusted as appropriate  - Keep care items and personal belongings within reach  - Initiate and maintain comfort rounds  - Make Fall Risk Sign visible to staff  - Offer Toileting every 2 Hours,  in advance of need  - Apply yellow socks and bracelet for high fall risk patients  - Consider moving patient to room near nurses station  Outcome: Progressing  Goal: Maintain or return to baseline ADL function  Description: INTERVENTIONS:  -  Assess patient's ability to carry out ADLs; assess patient's baseline for ADL function and identify physical deficits which impact ability to perform ADLs (bathing, care of mouth/teeth, toileting, grooming, dressing, etc.)  - Assess/evaluate cause of self-care deficits   - Assess range of motion  - Assess patient's mobility; develop plan if impaired  - Assess patient's need for assistive devices and provide as appropriate  - Encourage maximum independence but intervene and supervise when necessary  - Involve family in performance of ADLs  - Assess for home care needs following discharge   - Consider OT consult to assist with ADL evaluation and planning for discharge  - Provide patient education as appropriate  Outcome: Progressing  Goal: Maintains/Returns to pre admission functional level  Description: INTERVENTIONS:  - Perform AM-PAC 6 Click Basic Mobility/ Daily Activity assessment daily.  - Set and communicate daily mobility goal to care team and patient/family/caregiver.   - Collaborate with rehabilitation services on mobility goals if consulted  - Perform Range of Motion 3 times a day.  - Reposition patient every 2 hours.  - Dangle patient 3 times a day  - Stand patient 3 times a day  - Ambulate patient 3 times a day  - Out of bed to chair 3 times a day   - Out of bed for meals 3 times a day  - Out of bed for toileting  - Record patient progress and toleration of activity level   Outcome: Progressing     Problem: DISCHARGE PLANNING  Goal: Discharge to home or other facility with appropriate resources  Description: INTERVENTIONS:  - Identify barriers to discharge w/patient and caregiver  - Arrange for needed discharge resources and transportation as appropriate  - Identify  discharge learning needs (meds, wound care, etc.)  - Arrange for interpretive services to assist at discharge as needed  - Refer to Case Management Department for coordinating discharge planning if the patient needs post-hospital services based on physician/advanced practitioner order or complex needs related to functional status, cognitive ability, or social support system  Outcome: Progressing     Problem: Knowledge Deficit  Goal: Patient/family/caregiver demonstrates understanding of disease process, treatment plan, medications, and discharge instructions  Description: Complete learning assessment and assess knowledge base.  Interventions:  - Provide teaching at level of understanding  - Provide teaching via preferred learning methods  Outcome: Progressing

## 2024-02-13 NOTE — DISCHARGE INSTR - AVS FIRST PAGE
Take cefpodoxime 200 mg twice a day  Dr. Motley's office will call to schedule appointment next week  Continue yuen to leg bag and night drainage bag

## 2024-02-13 NOTE — NURSING NOTE
Urine started out this shift as pink to punch colored urine. No clots. Pt denies spasms, pressure or pain. Urine now is clear yellow. Total yuen output for this shift has been 2100mls.

## 2024-02-14 LAB
BACTERIA UR CULT: ABNORMAL
BACTERIA UR CULT: ABNORMAL

## 2024-02-14 PROCEDURE — 88307 TISSUE EXAM BY PATHOLOGIST: CPT | Performed by: PATHOLOGY

## 2024-02-21 PROBLEM — A41.9 SEPSIS (HCC): Status: RESOLVED | Noted: 2023-12-29 | Resolved: 2024-02-21

## 2024-03-04 ENCOUNTER — APPOINTMENT (EMERGENCY)
Dept: RADIOLOGY | Facility: HOSPITAL | Age: 74
DRG: 177 | End: 2024-03-04
Payer: MEDICARE

## 2024-03-04 ENCOUNTER — APPOINTMENT (EMERGENCY)
Dept: CT IMAGING | Facility: HOSPITAL | Age: 74
DRG: 177 | End: 2024-03-04
Payer: MEDICARE

## 2024-03-04 ENCOUNTER — HOSPITAL ENCOUNTER (INPATIENT)
Facility: HOSPITAL | Age: 74
LOS: 3 days | Discharge: HOME HEALTH | DRG: 177 | End: 2024-03-08
Attending: STUDENT IN AN ORGANIZED HEALTH CARE EDUCATION/TRAINING PROGRAM | Admitting: HOSPITALIST
Payer: MEDICARE

## 2024-03-04 DIAGNOSIS — R25.1 TREMOR: Primary | ICD-10-CM

## 2024-03-04 DIAGNOSIS — R09.02 HYPOXIA: ICD-10-CM

## 2024-03-04 DIAGNOSIS — A41.9 SEPSIS WITHOUT ACUTE ORGAN DYSFUNCTION, DUE TO UNSPECIFIED ORGANISM (HCC): ICD-10-CM

## 2024-03-04 DIAGNOSIS — U07.1 COVID-19: ICD-10-CM

## 2024-03-04 DIAGNOSIS — G40.909 SEIZURE DISORDER (HCC): ICD-10-CM

## 2024-03-04 DIAGNOSIS — R93.89 ABNORMAL CT SCAN, CHEST: ICD-10-CM

## 2024-03-04 DIAGNOSIS — D72.823 LEUKEMOID REACTION: ICD-10-CM
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
## 2024-03-04 PROBLEM — J96.01 ACUTE RESPIRATORY FAILURE WITH HYPOXIA (HCC): Status: ACTIVE | Noted: 2024-03-04

## 2024-03-04 LAB
2HR DELTA HS TROPONIN: -1 NG/L
ALBUMIN SERPL BCP-MCNC: 3.9 G/DL (ref 3.5–5)
ALP SERPL-CCNC: 79 U/L (ref 34–104)
ALT SERPL W P-5'-P-CCNC: 6 U/L (ref 7–52)
ANION GAP SERPL CALCULATED.3IONS-SCNC: 11 MMOL/L
AST SERPL W P-5'-P-CCNC: 9 U/L (ref 13–39)
BACTERIA UR QL AUTO: NORMAL /HPF
BASOPHILS # BLD AUTO: 0.07 THOUSANDS/ÂΜL (ref 0–0.1)
BASOPHILS NFR BLD AUTO: 1 % (ref 0–1)
BILIRUB SERPL-MCNC: 0.41 MG/DL (ref 0.2–1)
BILIRUB UR QL STRIP: NEGATIVE
BUN SERPL-MCNC: 21 MG/DL (ref 5–25)
CALCIUM SERPL-MCNC: 8.7 MG/DL (ref 8.4–10.2)
CARDIAC TROPONIN I PNL SERPL HS: 7 NG/L
CARDIAC TROPONIN I PNL SERPL HS: 8 NG/L
CHLORIDE SERPL-SCNC: 99 MMOL/L (ref 96–108)
CK SERPL-CCNC: 75 U/L (ref 39–308)
CLARITY UR: ABNORMAL
CO2 SERPL-SCNC: 25 MMOL/L (ref 21–32)
COLOR UR: YELLOW
CREAT SERPL-MCNC: 1.35 MG/DL (ref 0.6–1.3)
EOSINOPHIL # BLD AUTO: 0.29 THOUSAND/ÂΜL (ref 0–0.61)
EOSINOPHIL NFR BLD AUTO: 2 % (ref 0–6)
ERYTHROCYTE [DISTWIDTH] IN BLOOD BY AUTOMATED COUNT: 12.6 % (ref 11.6–15.1)
GFR SERPL CREATININE-BSD FRML MDRD: 51 ML/MIN/1.73SQ M
GLUCOSE SERPL-MCNC: 136 MG/DL (ref 65–140)
GLUCOSE UR STRIP-MCNC: NEGATIVE MG/DL
HCT VFR BLD AUTO: 39.3 % (ref 36.5–49.3)
HGB BLD-MCNC: 12.8 G/DL (ref 12–17)
HGB UR QL STRIP.AUTO: ABNORMAL
IMM GRANULOCYTES # BLD AUTO: 0.04 THOUSAND/UL (ref 0–0.2)
IMM GRANULOCYTES NFR BLD AUTO: 0 % (ref 0–2)
KETONES UR STRIP-MCNC: NEGATIVE MG/DL
LEUKOCYTE ESTERASE UR QL STRIP: NEGATIVE
LYMPHOCYTES # BLD AUTO: 1.04 THOUSANDS/ÂΜL (ref 0.6–4.47)
LYMPHOCYTES NFR BLD AUTO: 8 % (ref 14–44)
MCH RBC QN AUTO: 32.2 PG (ref 26.8–34.3)
MCHC RBC AUTO-ENTMCNC: 32.6 G/DL (ref 31.4–37.4)
MCV RBC AUTO: 99 FL (ref 82–98)
MONOCYTES # BLD AUTO: 1.21 THOUSAND/ÂΜL (ref 0.17–1.22)
MONOCYTES NFR BLD AUTO: 10 % (ref 4–12)
NEUTROPHILS # BLD AUTO: 10.12 THOUSANDS/ÂΜL (ref 1.85–7.62)
NEUTS SEG NFR BLD AUTO: 79 % (ref 43–75)
NITRITE UR QL STRIP: NEGATIVE
NON-SQ EPI CELLS URNS QL MICRO: NORMAL /HPF
NRBC BLD AUTO-RTO: 0 /100 WBCS
PH UR STRIP.AUTO: 7 [PH]
PLATELET # BLD AUTO: 286 THOUSANDS/UL (ref 149–390)
PMV BLD AUTO: 9.7 FL (ref 8.9–12.7)
POTASSIUM SERPL-SCNC: 4.3 MMOL/L (ref 3.5–5.3)
PROT SERPL-MCNC: 7.3 G/DL (ref 6.4–8.4)
PROT UR STRIP-MCNC: NEGATIVE MG/DL
RBC # BLD AUTO: 3.98 MILLION/UL (ref 3.88–5.62)
RBC #/AREA URNS AUTO: NORMAL /HPF
SODIUM SERPL-SCNC: 135 MMOL/L (ref 135–147)
SP GR UR STRIP.AUTO: <=1.005
UROBILINOGEN UR QL STRIP.AUTO: 0.2 E.U./DL
WBC # BLD AUTO: 12.77 THOUSAND/UL (ref 4.31–10.16)
WBC #/AREA URNS AUTO: NORMAL /HPF

## 2024-03-04 PROCEDURE — 74177 CT ABD & PELVIS W/CONTRAST: CPT

## 2024-03-04 PROCEDURE — 93005 ELECTROCARDIOGRAM TRACING: CPT

## 2024-03-04 PROCEDURE — 94664 DEMO&/EVAL PT USE INHALER: CPT

## 2024-03-04 PROCEDURE — 70450 CT HEAD/BRAIN W/O DYE: CPT

## 2024-03-04 PROCEDURE — 81001 URINALYSIS AUTO W/SCOPE: CPT | Performed by: STUDENT IN AN ORGANIZED HEALTH CARE EDUCATION/TRAINING PROGRAM

## 2024-03-04 PROCEDURE — 80053 COMPREHEN METABOLIC PANEL: CPT | Performed by: STUDENT IN AN ORGANIZED HEALTH CARE EDUCATION/TRAINING PROGRAM

## 2024-03-04 PROCEDURE — 82550 ASSAY OF CK (CPK): CPT | Performed by: STUDENT IN AN ORGANIZED HEALTH CARE EDUCATION/TRAINING PROGRAM

## 2024-03-04 PROCEDURE — 36415 COLL VENOUS BLD VENIPUNCTURE: CPT | Performed by: STUDENT IN AN ORGANIZED HEALTH CARE EDUCATION/TRAINING PROGRAM

## 2024-03-04 PROCEDURE — 99291 CRITICAL CARE FIRST HOUR: CPT | Performed by: STUDENT IN AN ORGANIZED HEALTH CARE EDUCATION/TRAINING PROGRAM

## 2024-03-04 PROCEDURE — 87040 BLOOD CULTURE FOR BACTERIA: CPT | Performed by: STUDENT IN AN ORGANIZED HEALTH CARE EDUCATION/TRAINING PROGRAM

## 2024-03-04 PROCEDURE — 84484 ASSAY OF TROPONIN QUANT: CPT | Performed by: STUDENT IN AN ORGANIZED HEALTH CARE EDUCATION/TRAINING PROGRAM

## 2024-03-04 PROCEDURE — 99223 1ST HOSP IP/OBS HIGH 75: CPT | Performed by: HOSPITALIST

## 2024-03-04 PROCEDURE — 85025 COMPLETE CBC W/AUTO DIFF WBC: CPT | Performed by: STUDENT IN AN ORGANIZED HEALTH CARE EDUCATION/TRAINING PROGRAM

## 2024-03-04 PROCEDURE — 71260 CT THORAX DX C+: CPT

## 2024-03-04 PROCEDURE — 99285 EMERGENCY DEPT VISIT HI MDM: CPT

## 2024-03-04 PROCEDURE — 81003 URINALYSIS AUTO W/O SCOPE: CPT | Performed by: STUDENT IN AN ORGANIZED HEALTH CARE EDUCATION/TRAINING PROGRAM

## 2024-03-04 PROCEDURE — 71275 CT ANGIOGRAPHY CHEST: CPT

## 2024-03-04 PROCEDURE — 71045 X-RAY EXAM CHEST 1 VIEW: CPT

## 2024-03-04 RX ORDER — DOCUSATE SODIUM 100 MG/1
100 CAPSULE, LIQUID FILLED ORAL 2 TIMES DAILY
Status: DISCONTINUED | OUTPATIENT
Start: 2024-03-04 | End: 2024-03-08 | Stop reason: HOSPADM

## 2024-03-04 RX ORDER — ONDANSETRON 2 MG/ML
4 INJECTION INTRAMUSCULAR; INTRAVENOUS EVERY 6 HOURS PRN
Status: DISCONTINUED | OUTPATIENT
Start: 2024-03-04 | End: 2024-03-08 | Stop reason: HOSPADM

## 2024-03-04 RX ORDER — ENOXAPARIN SODIUM 100 MG/ML
40 INJECTION SUBCUTANEOUS DAILY
Status: DISCONTINUED | OUTPATIENT
Start: 2024-03-05 | End: 2024-03-08 | Stop reason: HOSPADM

## 2024-03-04 RX ORDER — CARBAMAZEPINE 200 MG/1
400 TABLET, EXTENDED RELEASE ORAL 3 TIMES DAILY
Status: DISCONTINUED | OUTPATIENT
Start: 2024-03-04 | End: 2024-03-08 | Stop reason: HOSPADM

## 2024-03-04 RX ORDER — ACETAMINOPHEN 325 MG/1
650 TABLET ORAL EVERY 6 HOURS PRN
Status: DISCONTINUED | OUTPATIENT
Start: 2024-03-04 | End: 2024-03-08 | Stop reason: HOSPADM

## 2024-03-04 RX ORDER — LEVETIRACETAM 500 MG/1
500 TABLET ORAL 2 TIMES DAILY
Status: DISCONTINUED | OUTPATIENT
Start: 2024-03-04 | End: 2024-03-08 | Stop reason: HOSPADM

## 2024-03-04 RX ORDER — LISINOPRIL 5 MG/1
5 TABLET ORAL DAILY
Status: DISCONTINUED | OUTPATIENT
Start: 2024-03-05 | End: 2024-03-08 | Stop reason: HOSPADM

## 2024-03-04 RX ORDER — LEVOTHYROXINE SODIUM 0.03 MG/1
25 TABLET ORAL
Status: DISCONTINUED | OUTPATIENT
Start: 2024-03-05 | End: 2024-03-08 | Stop reason: HOSPADM

## 2024-03-04 RX ADMIN — IOHEXOL 100 ML: 350 INJECTION, SOLUTION INTRAVENOUS at 13:42

## 2024-03-04 RX ADMIN — IOHEXOL 90 ML: 350 INJECTION, SOLUTION INTRAVENOUS at 15:41

## 2024-03-04 RX ADMIN — DOCUSATE SODIUM 100 MG: 100 CAPSULE, LIQUID FILLED ORAL at 17:18

## 2024-03-04 RX ADMIN — CARBAMAZEPINE 400 MG: 200 TABLET, EXTENDED RELEASE ORAL at 17:18

## 2024-03-04 RX ADMIN — LEVETIRACETAM 500 MG: 500 TABLET, FILM COATED ORAL at 17:18

## 2024-03-04 RX ADMIN — SODIUM CHLORIDE 1000 ML: 0.9 INJECTION, SOLUTION INTRAVENOUS at 16:20

## 2024-03-04 NOTE — ED NOTES
Belongings completed by this RN with inpatient navigator. Wife at bedside, requesting update on time frame for bed transfer. Patient and family made aware of patient being held in ED at this time as no beds available.     Lianna Talley RN  03/04/24 [id number]

## 2024-03-04 NOTE — ASSESSMENT & PLAN NOTE
White blood cell count has increased from 12.77-15.83  Tmax since arrival is 100.4 degrees Fahrenheit  Will add 2 sets of blood cultures now, and also check procalcitonin levels  UA was grossly unremarkable from yesterday as well as the imaging  Will check for COVID-19, RSV, and influenza  We will hold off on antibiotics at this time

## 2024-03-04 NOTE — ASSESSMENT & PLAN NOTE
ll-defined part solid 5.3 x 2.1 cm opacity in the posterior basal left lower lobe, decreased in density medially since 12/29/2023 but increased in size since May 2023. Recommend follow-up with a chest CT with no contrast in 6 months to exclude a slowly growing adenocarcinoma spectrum lesion. A few 9 mm and smaller right lung nodules. These can also be reevaluated in 6 months. Mild right lower lobe fibrosis.   Await pulmonary evaluation

## 2024-03-04 NOTE — H&P
[facility]  History and physical examination  Name: Jose Roberto Herring I  MRN: [id number]  Unit/Bed#: TR 03 I Date of Admission: 3/4/2024   Date of Service: 3/4/2024 I Hospital Day: 0     Assessment/Plan   * Acute respiratory failure with hypoxia (HCC)  Assessment & Plan  Admit to observation medicine  Patient was noted to have an O2 sat of 83% at time of arrival into the emergency room  Patient is currently requiring 4 L of supplemental oxygen via nasal cannula with resultant O2 sat in the low to mid 90s  Unspecified exact etiology  Differential includes-  1. A right lower fibrosis as noted on CT chest PE study, PE was ruled out,   2. Patient's chest x-ray reveals an elevated left hemidiaphragm -question the possibility of diaphragmatic paralysis which may be the cause of the patient's hypoxia  3.  The findings as noted on the abnormal CT chest as outlined below  Will consult pulmonary  Wean oxygen as able otherwise     Fall  Assessment & Plan  Patient fell prior to coming into the hospital  Question the possibility of vasovagal syncope since this occurred in the setting of the patient using the bathroom, however the patient states that he tripped over the bathroom rug  Trauma imaging reviewed-no acute pathology  Will consult PT and OT     Essential hypertension  Assessment & Plan  Blood pressure stable  Continue lisinopril     Hypothyroidism  Assessment & Plan  Continue home dosing of Synthroid     Seizure disorder (HCC)  Assessment & Plan  Continue Keppra, continue carbamazepine     Hyperlipidemia  Assessment & Plan  Statin on hold-patient is normally on Crestor, patient reports an allergy to what we have available in the house which is Lipitor  Okay to resume Crestor at time of discharge     Leukemoid reaction  Assessment & Plan  Will monitor-no signs of infection     Abnormal CT scan, chest  Assessment & Plan  ll-defined part solid 5.3 x 2.1 cm opacity in the posterior basal left lower  lobe, decreased in density medially since 12/29/2023 but increased in size since May 2023. Recommend follow-up with a chest CT with no contrast in 6 months to exclude a slowly growing adenocarcinoma spectrum lesion. A few 9 mm and smaller right lung nodules. These can also be reevaluated in 6 months. Mild right lower lobe fibrosis.   Await pulmonary evaluation              VTE Pharmacologic Prophylaxis: VTE Score: 5 High Risk (Score >/= 5) - Pharmacological DVT Prophylaxis Ordered: enoxaparin (Lovenox). Sequential Compression Devices Ordered.  Code Status: Level 3 - DNAR and DNI reviewed with the patient  Discussion with family: Patient declined call to .      Anticipated Length of Stay: Patient will be admitted on an observation basis with an anticipated length of stay of less than 2 midnights secondary to the need to wean her supplemental oxygen, and the need for pulmonary evaluation.     Total Time Spent on Date of Encounter in care of patient: 65 mins. This time was spent on one or more of the following: performing physical exam; counseling and coordination of care; obtaining or reviewing history; documenting in the medical record; reviewing/ordering tests, medications or procedures; communicating with other healthcare professionals and discussing with patient's family/caregivers.     Chief Complaint: Status post fall x 1 day     History of Present Illness:  Jose Roberto Herring is a 73 y.o. male with a PMH of the conditions as outlined below who presents with the chief complaint as outlined above.     In brief, the patient is a 73-year-old male, who fell prior to coming into the hospital.  He reports that he was in his usual state of health up until this morning.  He woke up in his normal state of health, and went to the bathroom to urinate.  He reports after finishing urinating, he felt a little lightheaded, and reports that he tripped and slipped over the bathroom carpet rug.  He denied any chest  pain, nausea, vomiting, diarrhea, chills, palpitations, shortness of breath, numbness, tingling and/or weakness otherwise.  Patient denies any bowel, and/or bladder incontinence.  Patient did not bite his tongue and lower lip.  His wife became concerned, and the patient was brought into the hospital for further evaluation.  In the emergency room, most of the workup was grossly within normal limits, however the patient was found to be significantly hypoxic.  The patient has been admitted to be further evaluated for the cause of his hypoxia.     Review of Systems:  Review of Systems   Constitutional:  Negative for appetite change, chills, diaphoresis, fatigue and fever.   Respiratory:  Negative for cough, chest tightness and shortness of breath.    Cardiovascular:  Negative for chest pain, palpitations and leg swelling.   Gastrointestinal:  Negative for abdominal pain, blood in stool, constipation, diarrhea, nausea and vomiting.   Genitourinary:  Negative for difficulty urinating, dysuria, hematuria and urgency.   Skin:  Negative for color change and rash.   Allergic/Immunologic: Negative for food allergies.   Neurological:  Negative for dizziness, weakness, numbness and headaches.   All other systems reviewed and are negative.        Past Medical and Surgical History:   Medical History        Past Medical History:   Diagnosis Date    Ambulates with cane      Anemia      Arthritis      BPH (benign prostatic hyperplasia)      Cancer (HCC)       bladder    Chronic kidney disease      Chronic pain disorder      COPD (chronic obstructive pulmonary disease) (HCC)      COVID 12/29/2023     hospitalized    Disease of thyroid gland      Falls 01/2024    Gross hematuria      Hearing loss      Hyperlipidemia      Hypertension      Hypothyroid      Lesion of bladder      Low back pain      Malignant neoplasm of overlapping sites of bladder (HCC)      Malignant neoplasm of right kidney, except renal pelvis (HCC)      Prediabetes       Renal cyst      Right renal mass      Sciatica      Seizures (HCC)       last seizure 14 yrs ago    Urethral stricture      Wears glasses       reading            Surgical History         Past Surgical History:   Procedure Laterality Date    COLONOSCOPY        CYSTOSCOPY   2012, 2013, 2014    CYSTOSCOPY N/A 12/22/2017     Procedure: INTRAVESICAL MITOMYCIN;  Surgeon: Marlon Bernal MD;  Location: [facility];  Service: Urology    CYSTOSCOPY W/ RETROGRADES Bilateral 2012    PARTIAL NEPHRECTOMY Right 2013    HI CYSTO BLADDER W/URETERAL CATHETERIZATION Bilateral 11/30/2018     Procedure: CYSTO, RETROGRADE PYELOGRAM;  Surgeon: Marlon Bernal MD;  Location: [facility];  Service: Urology    HI CYSTOURETHROSCOPY W/DEST &/RMVL MED BLADDER RUDY N/A 12/22/2017     Procedure: TRANSURETHRAL RESECTION OF BLADDER TUMOR (TURBT);  Surgeon: Marlon Bernal MD;  Location: [facility];  Service: Urology    HI CYSTOURETHROSCOPY W/DEST &/RMVL MED BLADDER RUDY N/A 11/30/2018     Procedure: TURBT;  Surgeon: Marlon Bernal MD;  Location: [facility];  Service: Urology    HI CYSTOURETHROSCOPY W/DEST &/RMVL MED BLADDER RUDY N/A 2/12/2024     Procedure: CYSTOSCOPY; DVIU; TURBT;  Surgeon: Rasheed Motley MD;  Location: [facility];  Service: Urology    RENAL CYST EXCISION         questionable malignancy    TRANSURETHRAL RESECTION OF PROSTATE   2012    URETEROSCOPY Right 2/12/2024     Procedure: URETEROSCOPY; DILATION;  Surgeon: Rasheed Motley MD;  Location: [facility];  Service: Urology            Meds/Allergies:          Prior to Admission medications    Medication Sig Start Date End Date Taking? Authorizing Provider   aspirin (ECOTRIN LOW STRENGTH) 81 mg EC tablet Take 81 mg by mouth daily     Yes Historical Provider, MD   carBAMazepine (TEGretol XR) 400 mg 12 hr tablet Take 400 mg by mouth 3 (three) times a day     Yes Historical Provider, MD   Cyanocobalamin (VITAMIN B-12 PO) Take 1 tablet by mouth daily     Yes Historical  Provider, MD   ferrous sulfate 324 (65 Fe) mg Take 1 PO BID before a meal 10/19/23   Yes LEEANN Dubon   levETIRAcetam (KEPPRA) 500 mg tablet Take 500 mg by mouth 2 (two) times a day     Yes Historical Provider, MD   levothyroxine 75 mcg tablet Take 25 mcg by mouth daily Takes 50mcg alternating with 25mcg     Yes Historical Provider, MD   lisinopril (ZESTRIL) 5 mg tablet Take 5 mg by mouth daily     Yes Historical Provider, MD   rosuvastatin (CRESTOR) 20 MG tablet Take 20 mg by mouth daily     Yes Historical Provider, MD   acetaminophen (TYLENOL) 325 mg tablet Take 650 mg by mouth every 6 (six) hours as needed for mild pain  Patient not taking: Reported on 3/4/2024       Historical Provider, MD   Omega-3 Fatty Acids (FISH OIL PO) Take 1 capsule by mouth in the morning  Patient not taking: Reported on 3/4/2024       Historical Provider, MD   pantoprazole (PROTONIX) 40 mg tablet Take 1 tablet (40 mg total) by mouth 2 (two) times a day 23   LEEANN Dubon      I have reviewed home medications with patient personally.     Allergies:         Allergies   Allergen Reactions    Bactrim [Sulfamethoxazole-Trimethoprim] Itching, Hives and Rash       Rash, itching    Atorvastatin Drowsiness         Social History:  Marital Status: /Civil Union   Occupation: Retired  Patient Pre-hospital Living Situation: Home  Patient Pre-hospital Level of Mobility: walks  Patient Pre-hospital Diet Restrictions: None  Substance Use History:   Social History           Substance and Sexual Activity   Alcohol Use Not Currently     Comment: Former drinker.      Social History           Tobacco Use   Smoking Status Former    Current packs/day: 0.00    Average packs/day: 2.0 packs/day for 40.0 years (80.0 ttl pk-yrs)    Types: Cigarettes    Start date: 1974    Quit date: 2014    Years since quittin.2   Smokeless Tobacco Never      Social History          Substance and Sexual Activity   Drug Use No        "  Family History:  Family History         Family History   Problem Relation Age of Onset    Diabetes Family              Physical Exam:      Vitals:   Blood Pressure: 127/66 (03/04/24 1628)  Pulse: 94 (03/04/24 1628)  Temperature: 98.9 °F (37.2 °C) (03/04/24 1308)  Temp Source: Temporal (03/04/24 1308)  Respirations: 18 (03/04/24 1628)  Height: 6' 2\" (188 cm) (03/04/24 1315)  Weight - Scale: 99.8 kg (220 lb) (03/04/24 1310)  SpO2: 98 % (03/04/24 1628)     Physical Exam  Vitals and nursing note reviewed.   Constitutional:       General: He is not in acute distress.     Appearance: Normal appearance. He is not ill-appearing.   HENT:      Head: Normocephalic and atraumatic.      Nose: Nose normal.   Eyes:      Extraocular Movements: Extraocular movements intact.      Pupils: Pupils are equal, round, and reactive to light.   Cardiovascular:      Rate and Rhythm: Normal rate and regular rhythm.      Pulses: Normal pulses.      Heart sounds: Normal heart sounds. No murmur heard.     No friction rub. No gallop.   Pulmonary:      Effort: Pulmonary effort is normal.      Breath sounds: Normal breath sounds.   Abdominal:      General: There is no distension.      Palpations: Abdomen is soft. There is no mass.      Tenderness: There is no abdominal tenderness. There is no guarding or rebound.   Musculoskeletal:         General: No swelling or tenderness. Normal range of motion.      Cervical back: Normal range of motion and neck supple. No rigidity. No muscular tenderness.      Right lower leg: No edema.      Left lower leg: No edema.   Skin:     General: Skin is warm.      Capillary Refill: Capillary refill takes less than 2 seconds.      Findings: No erythema or rash.   Neurological:      General: No focal deficit present.      Mental Status: He is alert and oriented to person, place, and time. Mental status is at baseline.   Psychiatric:         Mood and Affect: Mood normal.         Behavior: Behavior normal.          "   Additional Data:      Lab Results:       Results from last 7 days   Lab Units 03/04/24  1316   WBC Thousand/uL 12.77*   HEMOGLOBIN g/dL 12.8   HEMATOCRIT % 39.3   PLATELETS Thousands/uL 286   NEUTROS PCT % 79*   LYMPHS PCT % 8*   MONOS PCT % 10   EOS PCT % 2           Results from last 7 days   Lab Units 03/04/24  1316   SODIUM mmol/L 135   POTASSIUM mmol/L 4.3   CHLORIDE mmol/L 99   CO2 mmol/L 25   BUN mg/dL 21   CREATININE mg/dL 1.35*   ANION GAP mmol/L 11   CALCIUM mg/dL 8.7   ALBUMIN g/dL 3.9   TOTAL BILIRUBIN mg/dL 0.41   ALK PHOS U/L 79   ALT U/L 6*   AST U/L 9*   GLUCOSE RANDOM mg/dL 136                         Lines/Drains:  Invasive Devices         Peripheral Intravenous Line  Duration                Peripheral IV 03/04/24 Distal;Right;Upper;Ventral (anterior) Arm <1 day                                Imaging: Reviewed radiology reports from this admission including: CT head  CTA ED chest PE Study   Final Result by Martha Trammell MD (03/04 1619)       No pulmonary embolus.       Ill-defined part solid 5.3 x 2.1 cm opacity in the posterior basal left lower lobe, decreased in density medially since 12/29/2023 but increased in size since May 2023. Recommend follow-up with a chest CT with no contrast in 6 months to exclude a slowly    growing adenocarcinoma spectrum lesion.       A few 9 mm and smaller right lung nodules. These can also be reevaluated in 6 months.       Mild right lower lobe fibrosis.       I personally discussed this study with Dr. ANNETTE PELLETIER on 3/4/2024 4:12 PM.               Workstation performed: [id number]           TRAUMA - CT head wo contrast   Final Result by Hank Worrell MD (03/04 0852)       No acute intracranial abnormality.                       Workstation performed: [id number]           TRAUMA - CT chest abdomen pelvis w contrast   Final Result by Hank Worrell MD (03/04 5882)       No acute traumatic CT findings.       Persistent patchy airspace consolidation left  lower lobe may reflect chronic atelectasis/scarring.       3 mm subpleural nodule right upper lobe laterally.       Cholelithiasis.       Bilateral renal cysts. Sequela of partial right nephrectomy with fat necrosis as before.       Mild enhancement of the walls of the distal right ureter noted with questionable minimal nodularity along the bladder wall at the level of the right UVJ. Consider further evaluation with follow-up cystoscopy.       Additional findings as above.                   Workstation performed: [id number]           XR Trauma chest portable   Final Result by Napoleon Montelongo MD (03/04 1337)       No acute cardiopulmonary disease. Elevation of the left hemidiaphragm, stable from the most recent prior study though new from 2019.               Workstation performed: [id number]                 EKG and Other Studies Reviewed on Admission:   EKG: NSR. HR 96 bpm.     ** Please Note: This note has been constructed using a voice recognition system. **

## 2024-03-04 NOTE — RESPIRATORY THERAPY NOTE
RT Protocol Note  Jose Roberto Herring 73 y.o. male MRN: [id number]  Unit/Bed#: TR 03 Encounter: [id number]    Assessment    Principal Problem:    Acute respiratory failure with hypoxia (HCC)  Active Problems:    Seizure disorder (HCC)    Essential hypertension    Hypothyroidism    Hyperlipidemia    Fall    Leukemoid reaction    Abnormal CT scan, chest      Home Pulmonary Medications:  none       Past Medical History:   Diagnosis Date    Ambulates with cane     Anemia     Arthritis     BPH (benign prostatic hyperplasia)     Cancer (HCC)     bladder    Chronic kidney disease     Chronic pain disorder     COPD (chronic obstructive pulmonary disease) (HCC)     COVID 2023    hospitalized    Disease of thyroid gland     Falls 2024    Gross hematuria     Hearing loss     Hyperlipidemia     Hypertension     Hypothyroid     Lesion of bladder     Low back pain     Malignant neoplasm of overlapping sites of bladder (HCC)     Malignant neoplasm of right kidney, except renal pelvis (HCC)     Prediabetes     Renal cyst     Right renal mass     Sciatica     Seizures (HCC)     last seizure 14 yrs ago    Urethral stricture     Wears glasses     reading     Social History     Socioeconomic History    Marital status: /Civil Union     Spouse name: None    Number of children: None    Years of education: None    Highest education level: None   Occupational History    None   Tobacco Use    Smoking status: Former     Current packs/day: 0.00     Average packs/day: 2.0 packs/day for 40.0 years (80.0 ttl pk-yrs)     Types: Cigarettes     Start date: 1974     Quit date: 2014     Years since quittin.2    Smokeless tobacco: Never   Vaping Use    Vaping status: Never Used   Substance and Sexual Activity    Alcohol use: Not Currently     Comment: Former drinker.    Drug use: No    Sexual activity: Not Currently   Other Topics Concern    None   Social History Narrative    None     Social Determinants of Health  "    Financial Resource Strain: Not on file   Food Insecurity: No Food Insecurity (2/13/2024)    Hunger Vital Sign     Worried About Running Out of Food in the Last Year: Never true     Ran Out of Food in the Last Year: Never true   Transportation Needs: No Transportation Needs (2/13/2024)    PRAPARE - Transportation     Lack of Transportation (Medical): No     Lack of Transportation (Non-Medical): No   Physical Activity: Not on file   Stress: Not on file   Social Connections: Not on file   Intimate Partner Violence: Not on file   Housing Stability: Low Risk  (2/13/2024)    Housing Stability Vital Sign     Unable to Pay for Housing in the Last Year: No     Number of Places Lived in the Last Year: 1     Unstable Housing in the Last Year: No       Subjective         Objective    Physical Exam:   Assessment Type: Assess only  General Appearance: Awake, Alert  Respiratory Pattern: Normal  Chest Assessment: Chest expansion symmetrical  Bilateral Breath Sounds: Diminished    Vitals:  Blood pressure 125/76, pulse 96, temperature 98 °F (36.7 °C), resp. rate 18, height 6' 2\" (1.88 m), weight 99.8 kg (220 lb), SpO2 99%.          Imaging and other studies: I have personally reviewed pertinent reports.            Plan    Respiratory Plan: Discontinue Protocol        Resp Comments: (P) Pt evaluated per respiratory protocal. Pt admitted s/p fall with recent weakness, found to be hypoxic in ED. Pt seen at this time on 3lnc. Pt has no sob at this time. No pulmonary hx. BS are diminshed bilat. CT shows mild RLL fibrosis. No bronchodilator therapy indicated at this time.   "

## 2024-03-04 NOTE — PROGRESS NOTES
[facility]  Progress Note  Name: Jose Roberto Herring I  MRN: [id number]  Unit/Bed#: TR 03 I Date of Admission: 3/4/2024   Date of Service: 3/4/2024 I Hospital Day: 0    Assessment/Plan   * Acute respiratory failure with hypoxia (HCC)  Assessment & Plan  Admit to observation medicine  Patient was noted to have an O2 sat of 83% at time of arrival into the emergency room  Patient is currently requiring 4 L of supplemental oxygen via nasal cannula with resultant O2 sat in the low to mid 90s  Unspecified exact etiology  Differential includes-  1. A right lower fibrosis as noted on CT chest PE study, PE was ruled out,   2. Patient's chest x-ray reveals an elevated left hemidiaphragm -question the possibility of diaphragmatic paralysis which may be the cause of the patient's hypoxia  3.  The findings as noted on the abnormal CT chest as outlined below  Will consult pulmonary  Wean oxygen as able otherwise    Fall  Assessment & Plan  Patient fell prior to coming into the hospital  Question the possibility of vasovagal syncope since this occurred in the setting of the patient using the bathroom, however the patient states that he tripped over the bathroom rug  Trauma imaging reviewed-no acute pathology  Will consult PT and OT    Essential hypertension  Assessment & Plan  Blood pressure stable  Continue lisinopril    Hypothyroidism  Assessment & Plan  Continue home dosing of Synthroid    Seizure disorder (HCC)  Assessment & Plan  Continue Keppra, continue carbamazepine    Hyperlipidemia  Assessment & Plan  Statin on hold-patient is normally on Crestor, patient reports an allergy to what we have available in the house which is Lipitor  Okay to resume Crestor at time of discharge    Leukemoid reaction  Assessment & Plan  Will monitor-no signs of infection    Abnormal CT scan, chest  Assessment & Plan  ll-defined part solid 5.3 x 2.1 cm opacity in the posterior basal left lower lobe, decreased in density  medially since 12/29/2023 but increased in size since May 2023. Recommend follow-up with a chest CT with no contrast in 6 months to exclude a slowly growing adenocarcinoma spectrum lesion. A few 9 mm and smaller right lung nodules. These can also be reevaluated in 6 months. Mild right lower lobe fibrosis.   Await pulmonary evaluation             VTE Pharmacologic Prophylaxis: VTE Score: 5 High Risk (Score >/= 5) - Pharmacological DVT Prophylaxis Ordered: enoxaparin (Lovenox). Sequential Compression Devices Ordered.  Code Status: Level 3 - DNAR and DNI reviewed with the patient  Discussion with family: Patient declined call to .     Anticipated Length of Stay: Patient will be admitted on an observation basis with an anticipated length of stay of less than 2 midnights secondary to the need to wean her supplemental oxygen, and the need for pulmonary evaluation.    Total Time Spent on Date of Encounter in care of patient: 65 mins. This time was spent on one or more of the following: performing physical exam; counseling and coordination of care; obtaining or reviewing history; documenting in the medical record; reviewing/ordering tests, medications or procedures; communicating with other healthcare professionals and discussing with patient's family/caregivers.    Chief Complaint: Status post fall x 1 day    History of Present Illness:  Jose Roberto Herring is a 73 y.o. male with a PMH of the conditions as outlined below who presents with the chief complaint as outlined above.    In brief, the patient is a 73-year-old male, who fell prior to coming into the hospital.  He reports that he was in his usual state of health up until this morning.  He woke up in his normal state of health, and went to the bathroom to urinate.  He reports after finishing urinating, he felt a little lightheaded, and reports that he tripped and slipped over the bathroom carpet rug.  He denied any chest pain, nausea, vomiting, diarrhea,  chills, palpitations, shortness of breath, numbness, tingling and/or weakness otherwise.  Patient denies any bowel, and/or bladder incontinence.  Patient did not bite his tongue and lower lip.  His wife became concerned, and the patient was brought into the hospital for further evaluation.  In the emergency room, most of the workup was grossly within normal limits, however the patient was found to be significantly hypoxic.  The patient has been admitted to be further evaluated for the cause of his hypoxia.    Review of Systems:  Review of Systems   Constitutional:  Negative for appetite change, chills, diaphoresis, fatigue and fever.   Respiratory:  Negative for cough, chest tightness and shortness of breath.    Cardiovascular:  Negative for chest pain, palpitations and leg swelling.   Gastrointestinal:  Negative for abdominal pain, blood in stool, constipation, diarrhea, nausea and vomiting.   Genitourinary:  Negative for difficulty urinating, dysuria, hematuria and urgency.   Skin:  Negative for color change and rash.   Allergic/Immunologic: Negative for food allergies.   Neurological:  Negative for dizziness, weakness, numbness and headaches.   All other systems reviewed and are negative.      Past Medical and Surgical History:   Past Medical History:   Diagnosis Date    Ambulates with cane     Anemia     Arthritis     BPH (benign prostatic hyperplasia)     Cancer (HCC)     bladder    Chronic kidney disease     Chronic pain disorder     COPD (chronic obstructive pulmonary disease) (HCC)     COVID 12/29/2023    hospitalized    Disease of thyroid gland     Falls 01/2024    Gross hematuria     Hearing loss     Hyperlipidemia     Hypertension     Hypothyroid     Lesion of bladder     Low back pain     Malignant neoplasm of overlapping sites of bladder (HCC)     Malignant neoplasm of right kidney, except renal pelvis (HCC)     Prediabetes     Renal cyst     Right renal mass     Sciatica     Seizures (HCC)     last  seizure 14 yrs ago    Urethral stricture     Wears glasses     reading       Past Surgical History:   Procedure Laterality Date    COLONOSCOPY      CYSTOSCOPY  2012, 2013, 2014    CYSTOSCOPY N/A 12/22/2017    Procedure: INTRAVESICAL MITOMYCIN;  Surgeon: Marlon Bernal MD;  Location: [facility];  Service: Urology    CYSTOSCOPY W/ RETROGRADES Bilateral 2012    PARTIAL NEPHRECTOMY Right 2013    KY CYSTO BLADDER W/URETERAL CATHETERIZATION Bilateral 11/30/2018    Procedure: CYSTO, RETROGRADE PYELOGRAM;  Surgeon: Marlon Bernal MD;  Location: [facility];  Service: Urology    KY CYSTOURETHROSCOPY W/DEST &/RMVL MED BLADDER RUDY N/A 12/22/2017    Procedure: TRANSURETHRAL RESECTION OF BLADDER TUMOR (TURBT);  Surgeon: Marlon Bernal MD;  Location: [facility];  Service: Urology    KY CYSTOURETHROSCOPY W/DEST &/RMVL MED BLADDER RUDY N/A 11/30/2018    Procedure: TURBT;  Surgeon: Marlon Bernal MD;  Location: [facility];  Service: Urology    KY CYSTOURETHROSCOPY W/DEST &/RMVL MED BLADDER RUDY N/A 2/12/2024    Procedure: CYSTOSCOPY; DVIU; TURBT;  Surgeon: Rasheed Motley MD;  Location: [facility];  Service: Urology    RENAL CYST EXCISION      questionable malignancy    TRANSURETHRAL RESECTION OF PROSTATE  2012    URETEROSCOPY Right 2/12/2024    Procedure: URETEROSCOPY; DILATION;  Surgeon: Rasheed Motley MD;  Location: [facility];  Service: Urology       Meds/Allergies:  Prior to Admission medications    Medication Sig Start Date End Date Taking? Authorizing Provider   aspirin (ECOTRIN LOW STRENGTH) 81 mg EC tablet Take 81 mg by mouth daily   Yes Historical Provider, MD   carBAMazepine (TEGretol XR) 400 mg 12 hr tablet Take 400 mg by mouth 3 (three) times a day   Yes Historical Provider, MD   Cyanocobalamin (VITAMIN B-12 PO) Take 1 tablet by mouth daily   Yes Historical Provider, MD   ferrous sulfate 324 (65 Fe) mg Take 1 PO BID before a meal 10/19/23  Yes LEEANN Dubon   levETIRAcetam (KEPPRA) 500 mg tablet  Take 500 mg by mouth 2 (two) times a day   Yes Historical Provider, MD   levothyroxine 75 mcg tablet Take 25 mcg by mouth daily Takes 50mcg alternating with 25mcg   Yes Historical Provider, MD   lisinopril (ZESTRIL) 5 mg tablet Take 5 mg by mouth daily   Yes Historical Provider, MD   rosuvastatin (CRESTOR) 20 MG tablet Take 20 mg by mouth daily   Yes Historical Provider, MD   acetaminophen (TYLENOL) 325 mg tablet Take 650 mg by mouth every 6 (six) hours as needed for mild pain  Patient not taking: Reported on 3/4/2024    Historical Provider, MD   Omega-3 Fatty Acids (FISH OIL PO) Take 1 capsule by mouth in the morning  Patient not taking: Reported on 3/4/2024    Historical Provider, MD   pantoprazole (PROTONIX) 40 mg tablet Take 1 tablet (40 mg total) by mouth 2 (two) times a day 23  LEEANN Dubon     I have reviewed home medications with patient personally.    Allergies:   Allergies   Allergen Reactions    Bactrim [Sulfamethoxazole-Trimethoprim] Itching, Hives and Rash     Rash, itching    Atorvastatin Drowsiness       Social History:  Marital Status: /Civil Union   Occupation: Retired  Patient Pre-hospital Living Situation: Home  Patient Pre-hospital Level of Mobility: walks  Patient Pre-hospital Diet Restrictions: None  Substance Use History:   Social History     Substance and Sexual Activity   Alcohol Use Not Currently    Comment: Former drinker.     Social History     Tobacco Use   Smoking Status Former    Current packs/day: 0.00    Average packs/day: 2.0 packs/day for 40.0 years (80.0 ttl pk-yrs)    Types: Cigarettes    Start date: 1974    Quit date: 2014    Years since quittin.2   Smokeless Tobacco Never     Social History     Substance and Sexual Activity   Drug Use No       Family History:  Family History   Problem Relation Age of Onset    Diabetes Family        Physical Exam:     Vitals:   Blood Pressure: 127/66 (24 1628)  Pulse: 94 (24  "1628)  Temperature: 98.9 °F (37.2 °C) (03/04/24 1308)  Temp Source: Temporal (03/04/24 1308)  Respirations: 18 (03/04/24 1628)  Height: 6' 2\" (188 cm) (03/04/24 1315)  Weight - Scale: 99.8 kg (220 lb) (03/04/24 1310)  SpO2: 98 % (03/04/24 1628)    Physical Exam  Vitals and nursing note reviewed.   Constitutional:       General: He is not in acute distress.     Appearance: Normal appearance. He is not ill-appearing.   HENT:      Head: Normocephalic and atraumatic.      Nose: Nose normal.   Eyes:      Extraocular Movements: Extraocular movements intact.      Pupils: Pupils are equal, round, and reactive to light.   Cardiovascular:      Rate and Rhythm: Normal rate and regular rhythm.      Pulses: Normal pulses.      Heart sounds: Normal heart sounds. No murmur heard.     No friction rub. No gallop.   Pulmonary:      Effort: Pulmonary effort is normal.      Breath sounds: Normal breath sounds.   Abdominal:      General: There is no distension.      Palpations: Abdomen is soft. There is no mass.      Tenderness: There is no abdominal tenderness. There is no guarding or rebound.   Musculoskeletal:         General: No swelling or tenderness. Normal range of motion.      Cervical back: Normal range of motion and neck supple. No rigidity. No muscular tenderness.      Right lower leg: No edema.      Left lower leg: No edema.   Skin:     General: Skin is warm.      Capillary Refill: Capillary refill takes less than 2 seconds.      Findings: No erythema or rash.   Neurological:      General: No focal deficit present.      Mental Status: He is alert and oriented to person, place, and time. Mental status is at baseline.   Psychiatric:         Mood and Affect: Mood normal.         Behavior: Behavior normal.         Additional Data:     Lab Results:  Results from last 7 days   Lab Units 03/04/24  1316   WBC Thousand/uL 12.77*   HEMOGLOBIN g/dL 12.8   HEMATOCRIT % 39.3   PLATELETS Thousands/uL 286   NEUTROS PCT % 79*   LYMPHS PCT % " 8*   MONOS PCT % 10   EOS PCT % 2     Results from last 7 days   Lab Units 03/04/24  1316   SODIUM mmol/L 135   POTASSIUM mmol/L 4.3   CHLORIDE mmol/L 99   CO2 mmol/L 25   BUN mg/dL 21   CREATININE mg/dL 1.35*   ANION GAP mmol/L 11   CALCIUM mg/dL 8.7   ALBUMIN g/dL 3.9   TOTAL BILIRUBIN mg/dL 0.41   ALK PHOS U/L 79   ALT U/L 6*   AST U/L 9*   GLUCOSE RANDOM mg/dL 136                       Lines/Drains:  Invasive Devices       Peripheral Intravenous Line  Duration             Peripheral IV 03/04/24 Distal;Right;Upper;Ventral (anterior) Arm <1 day                        Imaging: Reviewed radiology reports from this admission including: CT head  CTA ED chest PE Study   Final Result by Martha Trammell MD (03/04 1619)      No pulmonary embolus.      Ill-defined part solid 5.3 x 2.1 cm opacity in the posterior basal left lower lobe, decreased in density medially since 12/29/2023 but increased in size since May 2023. Recommend follow-up with a chest CT with no contrast in 6 months to exclude a slowly    growing adenocarcinoma spectrum lesion.      A few 9 mm and smaller right lung nodules. These can also be reevaluated in 6 months.      Mild right lower lobe fibrosis.      I personally discussed this study with Dr. ANNETTE PELLETIER on 3/4/2024 4:12 PM.            Workstation performed: [id number]         TRAUMA - CT head wo contrast   Final Result by Hank Worrell MD (03/04 2565)      No acute intracranial abnormality.                  Workstation performed: [id number]         TRAUMA - CT chest abdomen pelvis w contrast   Final Result by Hank Worrell MD (03/04 9680)      No acute traumatic CT findings.      Persistent patchy airspace consolidation left lower lobe may reflect chronic atelectasis/scarring.      3 mm subpleural nodule right upper lobe laterally.      Cholelithiasis.      Bilateral renal cysts. Sequela of partial right nephrectomy with fat necrosis as before.      Mild enhancement of the walls of the  distal right ureter noted with questionable minimal nodularity along the bladder wall at the level of the right UVJ. Consider further evaluation with follow-up cystoscopy.      Additional findings as above.               Workstation performed: [id number]         XR Trauma chest portable   Final Result by Napoleon Montelongo MD (03/04 1337)      No acute cardiopulmonary disease. Elevation of the left hemidiaphragm, stable from the most recent prior study though new from 2019.            Workstation performed: [id number]             EKG and Other Studies Reviewed on Admission:   EKG: NSR. HR 96 bpm.    ** Please Note: This note has been constructed using a voice recognition system. **

## 2024-03-04 NOTE — ED NOTES
Patient remains hypoxic on room air; trial of removal of oxygen failed as patient oxygen saturation 84% on room air. 2L NC reapplied and provider made aware.       Lianna Talley RN  03/04/24 [id number]

## 2024-03-04 NOTE — ED PROVIDER NOTES
Emergency Department Trauma Note  Jose Roberto Herring 73 y.o. male MRN: [id number]  Unit/Bed#: TR 03/TR 03 Encounter: [id number]      Trauma Alert: Trauma Acuity: Trauma Evaluation  Model of Arrival: Mode of Arrival:  (walk in) via    Trauma Team: Current Providers  Attending Provider: Adarsh Macedo MD  Attending Provider: Denver Ruiz MD  Registered Nurse: Lianna Talley RN  Consultants:     None      History of Present Illness     Chief Complaint:   Chief Complaint   Patient presents with    Fall    Shaking     HPI:  Jose Roberto Herring is a 73 y.o. male who presents with no.  Mechanism:Details of Incident: patient reported to be shaky this morning and fell with head strike on anticoagulants            Fall  Associated symptoms: no abdominal pain, no chest pain, no headaches, no nausea and no seizures    This a 73-year-old male who presents to the emergency department via his wife.  Majority of history obtained from wife who states over the last day or so patient has been excessively weak tired and not acting his normal self.  She notes that he stumbled on the floor yesterday was complaining of being excessively hot and she was concerned for fever.  However this morning she heard patient fall in the bathroom went into the bathroom to find him laying on the ground alert.  Upon questioning of the patient states he does not remember falling.  He is without any complaints at this time.    Review of Systems   Constitutional:  Negative for activity change, appetite change, chills, fatigue and fever.   HENT:  Negative for congestion, dental problem, drooling, ear discharge, ear pain, facial swelling, postnasal drip, rhinorrhea and sinus pain.    Eyes:  Negative for photophobia, pain, discharge and itching.   Respiratory:  Negative for apnea, cough, chest tightness and shortness of breath.    Cardiovascular:  Negative for chest pain and leg swelling.   Gastrointestinal:  Negative for abdominal distention,  abdominal pain, anal bleeding, constipation, diarrhea and nausea.   Endocrine: Negative for cold intolerance, heat intolerance and polydipsia.   Genitourinary:  Negative for difficulty urinating.   Musculoskeletal:  Negative for arthralgias, gait problem, joint swelling and myalgias.   Skin:  Negative for color change and pallor.   Allergic/Immunologic: Negative for immunocompromised state.   Neurological:  Negative for dizziness, seizures, facial asymmetry, weakness, light-headedness, numbness and headaches.   Psychiatric/Behavioral:  Negative for agitation, behavioral problems, confusion, decreased concentration and dysphoric mood.    All other systems reviewed and are negative.      Historical Information     Immunizations:   Immunization History   Administered Date(s) Administered    COVID-19 MODERNA VACC 0.5 ML IM 02/13/2021, 03/13/2021, 11/29/2021    Pneumococcal Conjugate Vaccine 20-valent (Pcv20), Polysace 06/10/2022    Pneumococcal Polysaccharide PPV23 01/05/2021    Td (adult), Unspecified 08/28/2012    Tdap 08/28/2012, 11/09/2021    Zoster Vaccine Recombinant 07/28/2022, 10/03/2022       Past Medical History:   Diagnosis Date    Ambulates with cane     Anemia     Arthritis     BPH (benign prostatic hyperplasia)     Cancer (HCC)     bladder    Chronic kidney disease     Chronic pain disorder     COPD (chronic obstructive pulmonary disease) (HCC)     COVID 12/29/2023    hospitalized    Disease of thyroid gland     Falls 01/2024    Gross hematuria     Hearing loss     Hyperlipidemia     Hypertension     Hypothyroid     Lesion of bladder     Low back pain     Malignant neoplasm of overlapping sites of bladder (HCC)     Malignant neoplasm of right kidney, except renal pelvis (HCC)     Prediabetes     Renal cyst     Right renal mass     Sciatica     Seizures (HCC)     last seizure 14 yrs ago    Urethral stricture     Wears glasses     reading       Family History   Problem Relation Age of Onset    Diabetes  Family      Past Surgical History:   Procedure Laterality Date    COLONOSCOPY      CYSTOSCOPY  2012, , 2014    CYSTOSCOPY N/A 2017    Procedure: INTRAVESICAL MITOMYCIN;  Surgeon: Marlon Bernal MD;  Location: [facility];  Service: Urology    CYSTOSCOPY W/ RETROGRADES Bilateral 2012    PARTIAL NEPHRECTOMY Right 2013    MO CYSTO BLADDER W/URETERAL CATHETERIZATION Bilateral 2018    Procedure: CYSTO, RETROGRADE PYELOGRAM;  Surgeon: Marlon Bernal MD;  Location: [facility];  Service: Urology    MO CYSTOURETHROSCOPY W/DEST &/RMVL MED BLADDER RUDY N/A 2017    Procedure: TRANSURETHRAL RESECTION OF BLADDER TUMOR (TURBT);  Surgeon: Marlon Bernal MD;  Location: [facility];  Service: Urology    MO CYSTOURETHROSCOPY W/DEST &/RMVL MED BLADDER RUDY N/A 2018    Procedure: TURBT;  Surgeon: Marlon Bernal MD;  Location: [facility];  Service: Urology    MO CYSTOURETHROSCOPY W/DEST &/RMVL MED BLADDER RUDY N/A 2024    Procedure: CYSTOSCOPY; DVIU; TURBT;  Surgeon: Rasheed Motley MD;  Location: [facility];  Service: Urology    RENAL CYST EXCISION      questionable malignancy    TRANSURETHRAL RESECTION OF PROSTATE      URETEROSCOPY Right 2024    Procedure: URETEROSCOPY; DILATION;  Surgeon: Rasheed Motley MD;  Location: [facility];  Service: Urology     Social History     Tobacco Use    Smoking status: Former     Current packs/day: 0.00     Average packs/day: 2.0 packs/day for 40.0 years (80.0 ttl pk-yrs)     Types: Cigarettes     Start date: 1974     Quit date: 2014     Years since quittin.2    Smokeless tobacco: Never   Vaping Use    Vaping status: Never Used   Substance Use Topics    Alcohol use: Not Currently     Comment: Former drinker.    Drug use: No     E-Cigarette/Vaping    E-Cigarette Use Never User      E-Cigarette/Vaping Substances       Family History: non-contributory    Meds/Allergies   Prior to Admission Medications   Prescriptions Last Dose  Informant Patient Reported? Taking?   Cyanocobalamin (VITAMIN B-12 PO)  Self Yes No   Sig: Take 1 tablet by mouth daily   Omega-3 Fatty Acids (FISH OIL PO)  Self Yes No   Sig: Take 1 capsule by mouth in the morning   acetaminophen (TYLENOL) 325 mg tablet  Self Yes No   Sig: Take 650 mg by mouth every 6 (six) hours as needed for mild pain   aspirin (ECOTRIN LOW STRENGTH) 81 mg EC tablet  Self Yes No   Sig: Take 81 mg by mouth daily   carBAMazepine (TEGretol XR) 400 mg 12 hr tablet  Self Yes No   Sig: Take 400 mg by mouth 3 (three) times a day   ferrous sulfate 324 (65 Fe) mg   No No   Sig: Take 1 PO BID before a meal   levETIRAcetam (KEPPRA) 500 mg tablet   Yes No   Sig: Take 500 mg by mouth 2 (two) times a day   levothyroxine 75 mcg tablet  Self Yes No   Sig: Take 25 mcg by mouth daily Takes 50mcg alternating with 25mcg   lisinopril (ZESTRIL) 5 mg tablet   Yes No   Sig: Take 5 mg by mouth daily   pantoprazole (PROTONIX) 40 mg tablet   No No   Sig: Take 1 tablet (40 mg total) by mouth 2 (two) times a day   rosuvastatin (CRESTOR) 20 MG tablet  Self Yes No   Sig: Take 20 mg by mouth daily      Facility-Administered Medications: None       Allergies   Allergen Reactions    Bactrim [Sulfamethoxazole-Trimethoprim] Itching, Hives and Rash     Rash, itching    Atorvastatin Drowsiness       PHYSICAL EXAM    PE limited by: none    Objective   Vitals:   First set: Temperature: 98.9 °F (37.2 °C) (03/04/24 1308)  Pulse: 98 (03/04/24 1310)  Respirations: 18 (03/04/24 1310)  Blood Pressure: 110/57 (03/04/24 1310)  SpO2: (!) 83 % (03/04/24 1314)    Primary Survey:   (A) Airway: Patent self maintained  (B) Breathing: Good bilateral chest expansion  (C) Circulation: Pulses:   normal  (D) Disabliity:  GCS Total:  15  (E) Expose:  Completed    Secondary Survey: (Click on Physical Exam tab above)  Physical Exam  Constitutional:       Appearance: He is well-developed.   HENT:      Head: Normocephalic.   Eyes:      Pupils: Pupils are  equal, round, and reactive to light.   Cardiovascular:      Rate and Rhythm: Normal rate and regular rhythm.   Pulmonary:      Effort: Pulmonary effort is normal.      Breath sounds: Normal breath sounds.   Abdominal:      General: Bowel sounds are normal.      Palpations: Abdomen is soft.   Musculoskeletal:         General: Normal range of motion.      Cervical back: Normal range of motion and neck supple.   Skin:     General: Skin is warm.         Cervical spine cleared by clinical criteria? Yes     Invasive Devices       Peripheral Intravenous Line  Duration             Peripheral IV 03/04/24 Distal;Right;Upper;Ventral (anterior) Arm <1 day              Drain  Duration             Urethral Catheter Latex 20 Fr. 21 days                    Lab Results:   Results Reviewed       Procedure Component Value Units Date/Time    HS Troponin I 2hr [[id number]]  (Normal) Collected: 03/04/24 1529    Lab Status: Final result Specimen: Blood from Arm, Right Updated: 03/04/24 1600     hs TnI 2hr 7 ng/L      Delta 2hr hsTnI -1 ng/L     Urine Microscopic [[id number]]  (Normal) Collected: 03/04/24 1501    Lab Status: Final result Specimen: Urine, Clean Catch Updated: 03/04/24 1524     RBC, UA 0-1 /hpf      WBC, UA 2-4 /hpf      Epithelial Cells Occasional /hpf      Bacteria, UA Occasional /hpf     HS Troponin I 4hr [[id number]]     Lab Status: No result Specimen: Blood     UA w Reflex to Microscopic w Reflex to Culture [[id number]]  (Abnormal) Collected: 03/04/24 1501    Lab Status: Final result Specimen: Urine, Clean Catch Updated: 03/04/24 1505     Color, UA Yellow     Clarity, UA Slightly Cloudy     Specific Gravity, UA <=1.005     pH, UA 7.0     Leukocytes, UA Negative     Nitrite, UA Negative     Protein, UA Negative mg/dl      Glucose, UA Negative mg/dl      Ketones, UA Negative mg/dl      Urobilinogen, UA 0.2 E.U./dl      Bilirubin, UA Negative     Occult Blood, UA 1+    Blood culture [[id number]] Collected: 03/04/24 1400    Lab  Status: In process Specimen: Blood from Arm, Left Updated: 03/04/24 1403    HS Troponin 0hr (reflex protocol) [[id number]]  (Normal) Collected: 03/04/24 1316    Lab Status: Final result Specimen: Blood from Arm, Right Updated: 03/04/24 1347     hs TnI 0hr 8 ng/L     Comprehensive metabolic panel [[id number]]  (Abnormal) Collected: 03/04/24 1316    Lab Status: Final result Specimen: Blood from Arm, Right Updated: 03/04/24 1341     Sodium 135 mmol/L      Potassium 4.3 mmol/L      Chloride 99 mmol/L      CO2 25 mmol/L      ANION GAP 11 mmol/L      BUN 21 mg/dL      Creatinine 1.35 mg/dL      Glucose 136 mg/dL      Calcium 8.7 mg/dL      AST 9 U/L      ALT 6 U/L      Alkaline Phosphatase 79 U/L      Total Protein 7.3 g/dL      Albumin 3.9 g/dL      Total Bilirubin 0.41 mg/dL      eGFR 51 ml/min/1.73sq m     Narrative:      [employer] guidelines for Chronic Kidney Disease (CKD):     Stage 1 with normal or high GFR (GFR > 90 mL/min/1.73 square meters)    Stage 2 Mild CKD (GFR = 60-89 mL/min/1.73 square meters)    Stage 3A Moderate CKD (GFR = 45-59 mL/min/1.73 square meters)    Stage 3B Moderate CKD (GFR = 30-44 mL/min/1.73 square meters)    Stage 4 Severe CKD (GFR = 15-29 mL/min/1.73 square meters)    Stage 5 End Stage CKD (GFR <15 mL/min/1.73 square meters)  Note: GFR calculation is accurate only with a steady state creatinine    CK [[id number]]  (Normal) Collected: 03/04/24 1316    Lab Status: Final result Specimen: Blood from Arm, Right Updated: 03/04/24 1341     Total CK 75 U/L     CBC and differential [[id number]]  (Abnormal) Collected: 03/04/24 1316    Lab Status: Final result Specimen: Blood from Arm, Right Updated: 03/04/24 1325     WBC 12.77 Thousand/uL      RBC 3.98 Million/uL      Hemoglobin 12.8 g/dL      Hematocrit 39.3 %      MCV 99 fL      MCH 32.2 pg      MCHC 32.6 g/dL      RDW 12.6 %      MPV 9.7 fL      Platelets 286 Thousands/uL      nRBC 0 /100 WBCs      Neutrophils Relative 79 %       Immat GRANS % 0 %      Lymphocytes Relative 8 %      Monocytes Relative 10 %      Eosinophils Relative 2 %      Basophils Relative 1 %      Neutrophils Absolute 10.12 Thousands/µL      Immature Grans Absolute 0.04 Thousand/uL      Lymphocytes Absolute 1.04 Thousands/µL      Monocytes Absolute 1.21 Thousand/µL      Eosinophils Absolute 0.29 Thousand/µL      Basophils Absolute 0.07 Thousands/µL     Blood culture [[id number]] Collected: 03/04/24 1316    Lab Status: In process Specimen: Blood from Arm, Right Updated: 03/04/24 1321                   Imaging Studies:   Direct to CT: Yes  TRAUMA - CT head wo contrast   Final Result by Hank Worrell MD (03/04 1412)      No acute intracranial abnormality.                  Workstation performed: [id number]         TRAUMA - CT chest abdomen pelvis w contrast   Final Result by Hank Worrell MD (03/04 1420)      No acute traumatic CT findings.      Persistent patchy airspace consolidation left lower lobe may reflect chronic atelectasis/scarring.      3 mm subpleural nodule right upper lobe laterally.      Cholelithiasis.      Bilateral renal cysts. Sequela of partial right nephrectomy with fat necrosis as before.      Mild enhancement of the walls of the distal right ureter noted with questionable minimal nodularity along the bladder wall at the level of the right UVJ. Consider further evaluation with follow-up cystoscopy.      Additional findings as above.               Workstation performed: [id number]         XR Trauma chest portable   Final Result by Napoleon Montelongo MD (03/04 4927)      No acute cardiopulmonary disease. Elevation of the left hemidiaphragm, stable from the most recent prior study though new from 2019.            Workstation performed: [id number]         CTA ED chest PE Study    (Results Pending)         Procedures  POC FAST     Date/Time: 3/4/2024 1:16 PM    Performed by: Adarsh Macedo MD  Authorized by: Adarsh Macedo MD    Other Assisting  Provider: No    Procedure details:     Exam Type:  Diagnostic    Indications: blunt abdominal trauma      Technique: extended FAST      Views obtained:  Heart - Pericardial sac, LUQ - Splenorenal space, Left thorax, Right thorax, Suprapubic - Pouch of Scottie and RUQ - Dorman's Pouch    Image quality: diagnostic      Image availability:  Images available in PACS  FAST Findings:     RUQ (Hepatorenal) free fluid: absent      LUQ (Splenorenal) free fluid: absent      Suprapubic free fluid: absent      Cardiac wall motion: identified      Pericardial effusion: absent    extended FAST (Pulmonary) findings:     Left lung sliding: Present      Right lung sliding: Present    Interpretation:     Impressions: negative    ECG 12 Lead Documentation Only    Date/Time: 3/4/2024 1:22 PM    Performed by: Adarsh Macedo MD  Authorized by: Adarsh Macedo MD    Indications / Diagnosis:  Trauma  Patient location:  ED  Previous ECG:     Previous ECG:  Compared to current    Similarity:  No change    Comparison to cardiac monitor: Yes    Interpretation:     Interpretation: normal    Rate:     ECG rate assessment: normal    Rhythm:     Rhythm: sinus rhythm    Ectopy:     Ectopy: none    QRS:     QRS axis:  Normal  Conduction:     Conduction: normal    CriticalCare Time    Date/Time: 3/4/2024 4:13 PM    Performed by: Adarsh Macedo MD  Authorized by: Adarsh Macedo MD    Critical care provider statement:     Critical care time (minutes):  30    Critical care time was exclusive of:  Separately billable procedures and treating other patients and teaching time    Critical care was necessary to treat or prevent imminent or life-threatening deterioration of the following conditions:  Respiratory failure    Critical care was time spent personally by me on the following activities:  Blood draw for specimens, obtaining history from patient or surrogate, development of treatment plan with patient or surrogate, evaluation of patient's response to  treatment, examination of patient, review of old charts, re-evaluation of patient's condition, ordering and review of radiographic studies, ordering and review of laboratory studies and ordering and performing treatments and interventions           ED Course  ED Course as of 03/04/24 1613   Mon Mar 04, 2024   1317 Upon evaluation patient noted to be hypoxic 80% on room air initiated on 3 L nasal cannula with symptomatic improvement to 91%.  Good bilateral breath sounds fast negative with positive bilateral lung sliding.   1325 WBC(!): 12.77   1342 Creatinine(!): 1.35   1342 Total CK: 75   1346 New LULA   1348 hs TnI 0hr: 8   1424 IMPRESSION:     No acute traumatic CT findings.     Persistent patchy airspace consolidation left lower lobe may reflect chronic atelectasis/scarring.     3 mm subpleural nodule right upper lobe laterally.     Cholelithiasis.     Bilateral renal cysts. Sequela of partial right nephrectomy with fat necrosis as before.     Mild enhancement of the walls of the distal right ureter noted with questionable minimal nodularity along the bladder wall at the level of the right UVJ. Consider further evaluation with follow-up cystoscopy.     Additional findings as above.     1515 Despite multiple different oxygen probes patient remains persistently hypoxic 82% on room air with oxygenation of 94% on 4 L.  [place] text to hospitalist to discuss patient will likely require admission.   1526 Hospitalist requesting CTA PE study.           Medical Decision Making  Problems Addressed:  Fall, initial encounter: acute illness or injury  Tremor: chronic illness or injury    Amount and/or Complexity of Data Reviewed  Labs: ordered. Decision-making details documented in ED Course.  Radiology: ordered. Decision-making details documented in ED Course.    Risk  Prescription drug management.  Decision regarding hospitalization.                Disposition  [facility] Transfer: No  Final diagnoses:   Tremor   Fall,  initial encounter   Hypoxia     Time reflects when diagnosis was documented in both MDM as applicable and the Disposition within this note       Time User Action Codes Description Comment    3/4/2024  2:29 PM Adarsh Macedo [R25.1] Tremor     3/4/2024  2:29 PM Adarsh Macedo [W19.XXXA] Fall, initial encounter     3/4/2024  4:13 PM Adarsh Macedo [R09.02] Hypoxia           ED Disposition       ED Disposition   Admit    Condition   Stable    Date/Time   Mon Mar 4, 2024  4:12 PM    Comment   --             Follow-up Information    None       Patient's Medications   Discharge Prescriptions    No medications on file     No discharge procedures on file.    PDMP Review         Value Time User    PDMP Reviewed  Yes 2/13/2024 10:27 AM Rasheed Motley MD            ED Provider  Electronically Signed by           Adarsh Macedo MD  03/04/24 [id number]       Adarsh Macedo MD  03/04/24 8656

## 2024-03-04 NOTE — ASSESSMENT & PLAN NOTE
Initially the patient fell prior to coming into the hospital, upon arrival the trauma imaging reviewed-no acute pathology  Unfortunately, overnight last night, the patient fell again  CT head from last night-Small posterior right vertex scalp hematoma but no calvarial fracture or acute intracranial abnormality is seen.   Will check orthostatics  PT OT evaluation is pending

## 2024-03-04 NOTE — ASSESSMENT & PLAN NOTE
Statin on hold-patient is normally on Crestor, patient reports an allergy to what we have available in the house which is Lipitor  Okay to resume Crestor at time of discharge

## 2024-03-04 NOTE — ED NOTES
Patient unable to void at this time, made aware of urine sample needed.     Lianna Talley RN  03/04/24 [id number]

## 2024-03-04 NOTE — ASSESSMENT & PLAN NOTE
Has improved  Patient was noted to have an O2 sat of 83% at time of arrival into the emergency room  Patient is now requiring 2 L of supplemental oxygen, highest requirements were 4 L at time of arrival  Unspecified exact etiology  Differential includes-  1. A right lower fibrosis as noted on CT chest PE study, PE was ruled out,   2. Patient's chest x-ray reveals an elevated left hemidiaphragm -question the possibility of diaphragmatic paralysis which may be the cause of the patient's hypoxia  3.  The findings as noted on the abnormal CT chest as outlined below  Await formal pulmonary input  Continue to wean oxygen as able

## 2024-03-05 ENCOUNTER — APPOINTMENT (OUTPATIENT)
Dept: CT IMAGING | Facility: HOSPITAL | Age: 74
DRG: 177 | End: 2024-03-05
Payer: MEDICARE

## 2024-03-05 ENCOUNTER — APPOINTMENT (OUTPATIENT)
Dept: RADIOLOGY | Facility: HOSPITAL | Age: 74
DRG: 177 | End: 2024-03-05
Payer: MEDICARE

## 2024-03-05 LAB
ALBUMIN SERPL BCP-MCNC: 3.6 G/DL (ref 3.5–5)
ALP SERPL-CCNC: 74 U/L (ref 34–104)
ALT SERPL W P-5'-P-CCNC: 6 U/L (ref 7–52)
ANION GAP SERPL CALCULATED.3IONS-SCNC: 11 MMOL/L
ARTERIAL PATENCY WRIST A: YES
AST SERPL W P-5'-P-CCNC: 7 U/L (ref 13–39)
ATRIAL RATE: 96 BPM
BASE EXCESS BLDA CALC-SCNC: -1.8 MMOL/L
BASOPHILS # BLD AUTO: 0.08 THOUSANDS/ÂΜL (ref 0–0.1)
BASOPHILS NFR BLD AUTO: 1 % (ref 0–1)
BILIRUB SERPL-MCNC: 0.44 MG/DL (ref 0.2–1)
BNP SERPL-MCNC: 56 PG/ML (ref 0–100)
BUN SERPL-MCNC: 19 MG/DL (ref 5–25)
CALCIUM SERPL-MCNC: 8.5 MG/DL (ref 8.4–10.2)
CHLORIDE SERPL-SCNC: 102 MMOL/L (ref 96–108)
CO2 SERPL-SCNC: 23 MMOL/L (ref 21–32)
CREAT SERPL-MCNC: 1.31 MG/DL (ref 0.6–1.3)
CRP SERPL QL: 202.4 MG/L
D DIMER PPP FEU-MCNC: 1.61 UG/ML FEU
EOSINOPHIL # BLD AUTO: 0.02 THOUSAND/ÂΜL (ref 0–0.61)
EOSINOPHIL NFR BLD AUTO: 0 % (ref 0–6)
ERYTHROCYTE [DISTWIDTH] IN BLOOD BY AUTOMATED COUNT: 12.5 % (ref 11.6–15.1)
FLUAV RNA RESP QL NAA+PROBE: NEGATIVE
FLUBV RNA RESP QL NAA+PROBE: NEGATIVE
GFR SERPL CREATININE-BSD FRML MDRD: 53 ML/MIN/1.73SQ M
GLUCOSE SERPL-MCNC: 126 MG/DL (ref 65–140)
HCO3 BLDA-SCNC: 21.5 MMOL/L (ref 22–28)
HCT VFR BLD AUTO: 35.1 % (ref 36.5–49.3)
HGB BLD-MCNC: 11.7 G/DL (ref 12–17)
IMM GRANULOCYTES # BLD AUTO: 0.06 THOUSAND/UL (ref 0–0.2)
IMM GRANULOCYTES NFR BLD AUTO: 0 % (ref 0–2)
LACTATE SERPL-SCNC: 1.6 MMOL/L (ref 0.5–2)
LYMPHOCYTES # BLD AUTO: 1.02 THOUSANDS/ÂΜL (ref 0.6–4.47)
LYMPHOCYTES NFR BLD AUTO: 6 % (ref 14–44)
MAGNESIUM SERPL-MCNC: 1.7 MG/DL (ref 1.9–2.7)
MCH RBC QN AUTO: 32.4 PG (ref 26.8–34.3)
MCHC RBC AUTO-ENTMCNC: 33.3 G/DL (ref 31.4–37.4)
MCV RBC AUTO: 97 FL (ref 82–98)
MONOCYTES # BLD AUTO: 1.43 THOUSAND/ÂΜL (ref 0.17–1.22)
MONOCYTES NFR BLD AUTO: 9 % (ref 4–12)
NASAL CANNULA: 2
NEUTROPHILS # BLD AUTO: 13.22 THOUSANDS/ÂΜL (ref 1.85–7.62)
NEUTS SEG NFR BLD AUTO: 84 % (ref 43–75)
NRBC BLD AUTO-RTO: 0 /100 WBCS
O2 CT BLDA-SCNC: 17.3 ML/DL (ref 16–23)
OXYHGB MFR BLDA: 93.8 % (ref 94–97)
P AXIS: 52 DEGREES
PCO2 BLDA: 32.3 MM HG (ref 36–44)
PH BLDA: 7.44 [PH] (ref 7.35–7.45)
PHOSPHATE SERPL-MCNC: 2.9 MG/DL (ref 2.3–4.1)
PLATELET # BLD AUTO: 223 THOUSANDS/UL (ref 149–390)
PMV BLD AUTO: 9.5 FL (ref 8.9–12.7)
PO2 BLDA: 75.6 MM HG (ref 75–129)
POTASSIUM SERPL-SCNC: 3.9 MMOL/L (ref 3.5–5.3)
PR INTERVAL: 206 MS
PROCALCITONIN SERPL-MCNC: 0.3 NG/ML
PROT SERPL-MCNC: 6.6 G/DL (ref 6.4–8.4)
QRS AXIS: 40 DEGREES
QRSD INTERVAL: 84 MS
QT INTERVAL: 308 MS
QTC INTERVAL: 389 MS
RBC # BLD AUTO: 3.61 MILLION/UL (ref 3.88–5.62)
RSV RNA RESP QL NAA+PROBE: NEGATIVE
SARS-COV-2 RNA RESP QL NAA+PROBE: POSITIVE
SODIUM SERPL-SCNC: 136 MMOL/L (ref 135–147)
SPECIMEN SOURCE: ABNORMAL
T WAVE AXIS: 46 DEGREES
VENTRICULAR RATE: 96 BPM
WBC # BLD AUTO: 15.83 THOUSAND/UL (ref 4.31–10.16)

## 2024-03-05 PROCEDURE — 86140 C-REACTIVE PROTEIN: CPT | Performed by: PHYSICIAN ASSISTANT

## 2024-03-05 PROCEDURE — 36600 WITHDRAWAL OF ARTERIAL BLOOD: CPT

## 2024-03-05 PROCEDURE — 84145 PROCALCITONIN (PCT): CPT | Performed by: HOSPITALIST

## 2024-03-05 PROCEDURE — 0241U HB NFCT DS VIR RESP RNA 4 TRGT: CPT | Performed by: HOSPITALIST

## 2024-03-05 PROCEDURE — 99223 1ST HOSP IP/OBS HIGH 75: CPT | Performed by: INTERNAL MEDICINE

## 2024-03-05 PROCEDURE — 83880 ASSAY OF NATRIURETIC PEPTIDE: CPT | Performed by: PHYSICIAN ASSISTANT

## 2024-03-05 PROCEDURE — 82805 BLOOD GASES W/O2 SATURATION: CPT | Performed by: INTERNAL MEDICINE

## 2024-03-05 PROCEDURE — 83605 ASSAY OF LACTIC ACID: CPT | Performed by: PHYSICIAN ASSISTANT

## 2024-03-05 PROCEDURE — 85379 FIBRIN DEGRADATION QUANT: CPT | Performed by: PHYSICIAN ASSISTANT

## 2024-03-05 PROCEDURE — 85025 COMPLETE CBC W/AUTO DIFF WBC: CPT | Performed by: HOSPITALIST

## 2024-03-05 PROCEDURE — 93010 ELECTROCARDIOGRAM REPORT: CPT | Performed by: INTERNAL MEDICINE

## 2024-03-05 PROCEDURE — 83735 ASSAY OF MAGNESIUM: CPT | Performed by: HOSPITALIST

## 2024-03-05 PROCEDURE — 73564 X-RAY EXAM KNEE 4 OR MORE: CPT

## 2024-03-05 PROCEDURE — 87040 BLOOD CULTURE FOR BACTERIA: CPT | Performed by: HOSPITALIST

## 2024-03-05 PROCEDURE — 84100 ASSAY OF PHOSPHORUS: CPT | Performed by: HOSPITALIST

## 2024-03-05 PROCEDURE — 70450 CT HEAD/BRAIN W/O DYE: CPT

## 2024-03-05 PROCEDURE — 80053 COMPREHEN METABOLIC PANEL: CPT | Performed by: HOSPITALIST

## 2024-03-05 PROCEDURE — 36415 COLL VENOUS BLD VENIPUNCTURE: CPT | Performed by: HOSPITALIST

## 2024-03-05 PROCEDURE — G0427 INPT/ED TELECONSULT70: HCPCS | Performed by: PSYCHIATRY & NEUROLOGY

## 2024-03-05 PROCEDURE — 97167 OT EVAL HIGH COMPLEX 60 MIN: CPT

## 2024-03-05 PROCEDURE — 93005 ELECTROCARDIOGRAM TRACING: CPT

## 2024-03-05 PROCEDURE — 99232 SBSQ HOSP IP/OBS MODERATE 35: CPT | Performed by: HOSPITALIST

## 2024-03-05 PROCEDURE — 97163 PT EVAL HIGH COMPLEX 45 MIN: CPT

## 2024-03-05 RX ORDER — CEFTRIAXONE 2 G/50ML
2000 INJECTION, SOLUTION INTRAVENOUS ONCE
Status: COMPLETED | OUTPATIENT
Start: 2024-03-05 | End: 2024-03-05

## 2024-03-05 RX ORDER — SODIUM CHLORIDE, SODIUM GLUCONATE, SODIUM ACETATE, POTASSIUM CHLORIDE, MAGNESIUM CHLORIDE, SODIUM PHOSPHATE, DIBASIC, AND POTASSIUM PHOSPHATE .53; .5; .37; .037; .03; .012; .00082 G/100ML; G/100ML; G/100ML; G/100ML; G/100ML; G/100ML; G/100ML
75 INJECTION, SOLUTION INTRAVENOUS CONTINUOUS
Status: DISCONTINUED | OUTPATIENT
Start: 2024-03-05 | End: 2024-03-07

## 2024-03-05 RX ORDER — ACETAMINOPHEN 10 MG/ML
1000 INJECTION, SOLUTION INTRAVENOUS ONCE
Status: COMPLETED | OUTPATIENT
Start: 2024-03-05 | End: 2024-03-05

## 2024-03-05 RX ORDER — SODIUM CHLORIDE, SODIUM GLUCONATE, SODIUM ACETATE, POTASSIUM CHLORIDE, MAGNESIUM CHLORIDE, SODIUM PHOSPHATE, DIBASIC, AND POTASSIUM PHOSPHATE .53; .5; .37; .037; .03; .012; .00082 G/100ML; G/100ML; G/100ML; G/100ML; G/100ML; G/100ML; G/100ML
1000 INJECTION, SOLUTION INTRAVENOUS ONCE
Status: COMPLETED | OUTPATIENT
Start: 2024-03-05 | End: 2024-03-05

## 2024-03-05 RX ORDER — DEXAMETHASONE SODIUM PHOSPHATE 10 MG/ML
6 INJECTION, SOLUTION INTRAMUSCULAR; INTRAVENOUS EVERY 24 HOURS
Status: DISCONTINUED | OUTPATIENT
Start: 2024-03-06 | End: 2024-03-07

## 2024-03-05 RX ADMIN — SODIUM CHLORIDE 2000 ML: 0.9 INJECTION, SOLUTION INTRAVENOUS at 14:32

## 2024-03-05 RX ADMIN — DOCUSATE SODIUM 100 MG: 100 CAPSULE, LIQUID FILLED ORAL at 09:29

## 2024-03-05 RX ADMIN — SODIUM CHLORIDE 1000 ML: 0.9 INJECTION, SOLUTION INTRAVENOUS at 16:00

## 2024-03-05 RX ADMIN — SODIUM CHLORIDE, SODIUM GLUCONATE, SODIUM ACETATE, POTASSIUM CHLORIDE, MAGNESIUM CHLORIDE, SODIUM PHOSPHATE, DIBASIC, AND POTASSIUM PHOSPHATE 75 ML/HR: .53; .5; .37; .037; .03; .012; .00082 INJECTION, SOLUTION INTRAVENOUS at 17:06

## 2024-03-05 RX ADMIN — CEFTRIAXONE 2000 MG: 2 INJECTION, SOLUTION INTRAVENOUS at 14:54

## 2024-03-05 RX ADMIN — ACETAMINOPHEN 650 MG: 325 TABLET ORAL at 03:12

## 2024-03-05 RX ADMIN — CARBAMAZEPINE 400 MG: 200 TABLET, EXTENDED RELEASE ORAL at 09:29

## 2024-03-05 RX ADMIN — ENOXAPARIN SODIUM 40 MG: 40 INJECTION SUBCUTANEOUS at 09:30

## 2024-03-05 RX ADMIN — LEVOTHYROXINE SODIUM 25 MCG: 25 TABLET ORAL at 09:29

## 2024-03-05 RX ADMIN — LISINOPRIL 5 MG: 5 TABLET ORAL at 09:29

## 2024-03-05 RX ADMIN — CARBAMAZEPINE 400 MG: 200 TABLET, EXTENDED RELEASE ORAL at 00:52

## 2024-03-05 RX ADMIN — CARBAMAZEPINE 400 MG: 200 TABLET, EXTENDED RELEASE ORAL at 17:07

## 2024-03-05 RX ADMIN — ACETAMINOPHEN 650 MG: 325 TABLET ORAL at 14:06

## 2024-03-05 RX ADMIN — ACETAMINOPHEN 1000 MG: 10 INJECTION INTRAVENOUS at 18:38

## 2024-03-05 RX ADMIN — LEVETIRACETAM 500 MG: 500 TABLET, FILM COATED ORAL at 17:07

## 2024-03-05 RX ADMIN — SODIUM CHLORIDE, SODIUM GLUCONATE, SODIUM ACETATE, POTASSIUM CHLORIDE, MAGNESIUM CHLORIDE, SODIUM PHOSPHATE, DIBASIC, AND POTASSIUM PHOSPHATE 1000 ML: .53; .5; .37; .037; .03; .012; .00082 INJECTION, SOLUTION INTRAVENOUS at 19:16

## 2024-03-05 RX ADMIN — LEVETIRACETAM 500 MG: 500 TABLET, FILM COATED ORAL at 09:29

## 2024-03-05 NOTE — OCCUPATIONAL THERAPY NOTE
Occupational Therapy Evaluation      Jose Roberto Herring    3/5/2024    Principal Problem:    Acute respiratory failure with hypoxia (HCC)  Active Problems:    Seizure disorder (HCC)    Essential hypertension    Hypothyroidism    Hyperlipidemia    Fall    Leukemoid reaction    Abnormal CT scan, chest      Past Medical History:   Diagnosis Date    Ambulates with cane     Anemia     Arthritis     BPH (benign prostatic hyperplasia)     Cancer (HCC)     bladder    Chronic kidney disease     Chronic pain disorder     COPD (chronic obstructive pulmonary disease) (HCC)     COVID 12/29/2023    hospitalized    Disease of thyroid gland     Falls 01/2024    Gross hematuria     Hearing loss     Hyperlipidemia     Hypertension     Hypothyroid     Lesion of bladder     Low back pain     Malignant neoplasm of overlapping sites of bladder (HCC)     Malignant neoplasm of right kidney, except renal pelvis (HCC)     Prediabetes     Renal cyst     Right renal mass     Sciatica     Seizures (HCC)     last seizure 14 yrs ago    Urethral stricture     Wears glasses     reading       Past Surgical History:   Procedure Laterality Date    COLONOSCOPY      CYSTOSCOPY  2012, 2013, 2014    CYSTOSCOPY N/A 12/22/2017    Procedure: INTRAVESICAL MITOMYCIN;  Surgeon: Marlon Bernal MD;  Location: [facility];  Service: Urology    CYSTOSCOPY W/ RETROGRADES Bilateral 2012    PARTIAL NEPHRECTOMY Right 2013    HI CYSTO BLADDER W/URETERAL CATHETERIZATION Bilateral 11/30/2018    Procedure: CYSTO, RETROGRADE PYELOGRAM;  Surgeon: Marlon Bernal MD;  Location[facility];  Service: Urology    HI CYSTOURETHROSCOPY W/DEST &/RMVL MED BLADDER RUDY N/A 12/22/2017    Procedure: TRANSURETHRAL RESECTION OF BLADDER TUMOR (TURBT);  Surgeon: Marlon Bernal MD;  [facility];  Service: Urology    HI CYSTOURETHROSCOPY W/DEST &/RMVL MED BLADDER RUDY N/A 11/30/2018    Procedure: TURBT;  Surgeon: Marlon Bernal MD;  [facility];  Service: Urology     CT CYSTOURETHROSCOPY W/DEST &/RMVL MED BLADDER RUDY N/A 2/12/2024    Procedure: CYSTOSCOPY; DVIU; TURBT;  Surgeon: Rasheed Motley MD;  Location: [facility];  Service: Urology    RENAL CYST EXCISION      questionable malignancy    TRANSURETHRAL RESECTION OF PROSTATE  2012    URETEROSCOPY Right 2/12/2024    Procedure: URETEROSCOPY; DILATION;  Surgeon: Rasheed Motley MD;  Location: [facility];  Service: Urology        03/05/24 0813   OT Last Visit   OT Visit Date 03/05/24   Note Type   Note type Evaluation   Pain Assessment   Pain Assessment Tool 0-10   Pain Score No Pain   Restrictions/Precautions   Weight Bearing Precautions Per Order No   Other Precautions Cognitive;Bed Alarm;Impulsive;Multiple lines;Telemetry;O2;Fall Risk;Hard of hearing   Home Living   Type of Home House  (bilevel)   Home Layout Performs ADLs on one level;Able to live on main level with bedroom/bathroom;Stairs to enter with rails   Bathroom Shower/Tub Walk-in shower   Bathroom Toilet Standard   Bathroom Equipment Grab bars in shower   Bathroom Accessibility Accessible   Home Equipment Cane  (baseline SPC usage)   Prior Function   Level of [place] Independent with ADLs;Independent with functional mobility;Needs assistance with IADLS   Lives With Spouse   Receives Help From Family   IADLs Family/Friend/Other provides transportation;Family/Friend/Other provides meals;Family/Friend/Other provides medication management   Falls in the last 6 months 1 to 4   Vocational Retired   ADL   UB Bathing Assistance 3  Moderate Assistance   LB Bathing Assistance 1  Total Assistance   UB Dressing Assistance 2  Maximal Assistance   LB Dressing Assistance 1  Total Assistance   Bed Mobility   Supine to Sit 3  Moderate assistance   Additional items Assist x 2;HOB elevated;Bedrails;Increased time required;Verbal cues;LE management   Sit to Supine 2  Maximal assistance   Additional items Assist x 2;Bedrails;Increased time required;Verbal cues;LE management  "  Transfers   Sit to Stand 2  Maximal assistance   Additional items Assist x 2;Increased time required;Verbal cues   Stand to Sit 2  Maximal assistance   Additional items Assist x 2;Increased time required;Verbal cues   Stand pivot Unable to assess  (due to safety concerns)   Balance   Static Sitting Poor +   Dynamic Sitting Poor   Static Standing Poor -   Dynamic Standing Poor -   Activity Tolerance   Activity Tolerance Patient limited by fatigue;Other (Comment)  (Pt cognition)   Medical Staff Made Aware CM notified   RUE Assessment   RUE Assessment X  (AROM WFL)   RUE Strength   RUE Overall Strength Unable to assess;Due to cognitive deficits   LUE Assessment   LUE Assessment X  (AROM WFL)   LUE Strength   LUE Overall Strength Unable to assess;Due to cognitive deficits   Cognition   Overall Cognitive Status Impaired   Arousal/Participation Responsive   Attention Difficulty attending to directions   Orientation Level Oriented to person;Oriented to place;Disoriented to time;Oriented to situation  ('I fell by the toilet\")   Memory Decreased recall of precautions;Decreased recall of recent events   Following Commands Follows one step commands with increased time or repetition   Assessment   Limitation Decreased ADL status;Decreased UE strength;Decreased Safe judgement during ADL;Decreased endurance;Decreased self-care trans;Decreased high-level ADLs;Decreased cognition   Prognosis Fair   Assessment Pt is a 73 y.o. male seen for OT evaluation s/p admit to [facility] on 3/4/2024 w/ Acute respiratory failure with hypoxia (HCC). Comorbidities affecting pt's functional performance at time of assessment include:  Fall, Shaking . Personal factors affecting pt at time of IE include:steps to enter environment, difficulty performing ADLS, limited insight into deficits, and decreased initiation and engagement . Prior to admission, pt was Mod I with ADLs. Upon evaluation: the following deficits impact occupational performance: " decreased strength, decreased balance, decreased tolerance, impaired attention, impaired initiation, impaired memory, impaired sequencing, impaired problem solving, impulsivity, and decreased safety awareness. Pt to benefit from continued skilled OT tx while in the hospital to address deficits as defined above and maximize level of functional independence w ADL's and functional mobility. Occupational Performance areas to address include: bathing/shower, toilet hygiene, dressing, functional mobility, and clothing management. From OT standpoint, recommendation at time of d/c would be Level II (Moderate Resource Intensity.   Goals   Patient Goals Pt unable to report due to cognition   Plan   Treatment Interventions ADL retraining;Functional transfer training;UE strengthening/ROM;Endurance training;Patient/family training;Equipment evaluation/education;Compensatory technique education;Energy conservation;Activityengagement;Cognitive reorientation   Goal Expiration Date 03/15/24   OT Treatment Day 0   OT Frequency 3-5x/wk   Discharge Recommendation   Rehab Resource Intensity Level, OT II (Moderate Resource Intensity)   Additional Comments  Pt seen as a co-eval with PT due to the patient's co-morbidities, clinically unstable presentation, and present impairments which are a regression from the patient's baseline.   Additional Comments 2 The patient's raw score on the AM-PAC Daily Activity Inpatient Short Form is 10. A raw score of less than 19 suggests the patient may benefit from discharge to post-acute rehabilitation services. Please refer to the recommendation of the Occupational Therapist for safe discharge planning.   AM-PAC Daily Activity Inpatient   Lower Body Dressing 1   Bathing 2   Toileting 1   Upper Body Dressing 2   Grooming 2   Eating 2   Daily Activity Raw Score 10   Turning Head Towards Sound 3   Follow Simple Instructions 2   Low Function Daily Activity Raw Score 15   Low Function Daily Activity  Standardized Score  26.28   AM-PAC Applied Cognition Inpatient   Following a Speech/Presentation 2   Understanding Ordinary Conversation 2   Taking Medications 1   Remembering Where Things Are Placed or Put Away 1   Remembering List of 4-5 Errands 1   Taking Care of Complicated Tasks 1   Applied Cognition Raw Score 8   Applied Cognition Standardized Score 19.32     GOALS:    Pt will achieve the following within specified time frame: STG  Pt will achieve the following goals within 5 days    *ADL transfers with Max (A) for inc'd independence with ADLs/purposeful tasks    *Self Feeding- Min (A) for inc'd independence with providing self nourishment    *UB ADL with Mod (A) for inc'd independence with self cares    *LB ADL with Max (A) using AE prn for inc'd independence with self cares    *Toileting with Max (A) for clothing management and hygiene for return to PLOF with personal care    *Increase static stand balance and dyn stand balance to P for inc'd safety with standing purposeful tasks    *Increase stand tolerance x1 m for inc'd tolerance with standing purposeful tasks    *Participate in 10m UE therex to increase overall stamina/activity tolerance for purposeful tasks    *Bed mobility- Max (A) for inc'd independence to manage own comfort and initiate EOB & OOB purposeful tasks    Pt will achieve the following within specified time frame: LTG  Pt will achieve the following goals within 10 days    *ADL transfers with Mod (A) for inc'd independence with ADLs/purposeful tasks    *Self Feeding- CGA for inc'd independence with providing self nourishment    *UB ADL with Min (A) for inc'd independence with self cares    *LB ADL with Mod (A) using AE prn for inc'd independence with self cares    *Toileting with Mod (A) for clothing management and hygiene for return to PLOF with personal care    *Increase static stand balance and dyn stand balance to P+ for inc'd safety with standing purposeful tasks    *Increase stand  tolerance x3 m for inc'd tolerance with standing purposeful tasks    *Bed mobility- Mod (A) for inc'd independence to manage own comfort and initiate EOB & OOB purposeful tasks      Lou Donnelly MS, OTR/L

## 2024-03-05 NOTE — PROGRESS NOTES
[facility]  Progress Note  Name: Jose Roberto Herring I  MRN: [id number]  Unit/Bed#: ED 28 I Date of Admission: 3/4/2024   Date of Service: 3/5/2024 I Hospital Day: 0    Assessment/Plan   * Acute respiratory failure with hypoxia (HCC)  Assessment & Plan  Has improved  Patient was noted to have an O2 sat of 83% at time of arrival into the emergency room  Patient is now requiring 2 L of supplemental oxygen, highest requirements were 4 L at time of arrival  Unspecified exact etiology  Differential includes-  1. A right lower fibrosis as noted on CT chest PE study, PE was ruled out,   2. Patient's chest x-ray reveals an elevated left hemidiaphragm -question the possibility of diaphragmatic paralysis which may be the cause of the patient's hypoxia  3.  The findings as noted on the abnormal CT chest as outlined below  Await formal pulmonary input  Continue to wean oxygen as able    Fall  Assessment & Plan  Initially the patient fell prior to coming into the hospital, upon arrival the trauma imaging reviewed-no acute pathology  Unfortunately, overnight last night, the patient fell again  CT head from last night-Small posterior right vertex scalp hematoma but no calvarial fracture or acute intracranial abnormality is seen.   Will check orthostatics  PT OT evaluation is pending      Essential hypertension  Assessment & Plan  Blood pressure stable  Continue lisinopril    Hypothyroidism  Assessment & Plan  Continue home dosing of Synthroid    Seizure disorder (HCC)  Assessment & Plan  Continue Keppra, continue carbamazepine    Hyperlipidemia  Assessment & Plan  Statin on hold-patient is normally on Crestor, patient reports an allergy to what we have available in the house which is Lipitor  Okay to resume Crestor at time of discharge    Leukemoid reaction  Assessment & Plan  White blood cell count has increased from 12.77-15.83  Tmax since arrival is 100.4 degrees Fahrenheit  Will add 2 sets of blood  cultures now, and also check procalcitonin levels  UA was grossly unremarkable from yesterday as well as the imaging  Will check for COVID-19, RSV, and influenza  We will hold off on antibiotics at this time    Abnormal CT scan, chest  Assessment & Plan  ll-defined part solid 5.3 x 2.1 cm opacity in the posterior basal left lower lobe, decreased in density medially since 12/29/2023 but increased in size since May 2023. Recommend follow-up with a chest CT with no contrast in 6 months to exclude a slowly growing adenocarcinoma spectrum lesion. A few 9 mm and smaller right lung nodules. These can also be reevaluated in 6 months. Mild right lower lobe fibrosis.   Await pulmonary evaluation               VTE Pharmacologic Prophylaxis: VTE Score: 5 High Risk (Score >/= 5) - Pharmacological DVT Prophylaxis Ordered: enoxaparin (Lovenox). Sequential Compression Devices Ordered.    Mobility:   Basic Mobility Inpatient Raw Score: 24  JH-HLM Goal: 8: Walk 250 feet or more  JH-HLM Achieved: 7: Walk 25 feet or more  HLM Goal NOT achieved. Continue with multidisciplinary rounding and encourage appropriate mobility to improve upon HLM goals.    Patient Centered Rounds: I performed bedside rounds with nursing staff today.   Discussions with Specialists or Other Care Team Provider: Pulmonary    Education and Discussions with Family / Patient: Updated  (wife) via phone. -Patient's wife Kenyatta was brought up to par at 9:45 AM    Total Time Spent on Date of Encounter in care of patient: 45 mins. This time was spent on one or more of the following: performing physical exam; counseling and coordination of care; obtaining or reviewing history; documenting in the medical record; reviewing/ordering tests, medications or procedures; communicating with other healthcare professionals and discussing with patient's family/caregivers.    Current Length of Stay: 0 day(s)  Current Patient Status: Observation   Certification Statement:  The patient, admitted on an observation basis, will now require > 2 midnight hospital stay due to the need for pulmonary evaluation, PT and OT evaluation, and possible placement  Discharge Plan: Anticipate discharge in 24-48 hrs to discharge location to be determined pending rehab evaluations.    Code Status: Level 3 - DNAR and DNI    Subjective:   Patient seen, resting in bed, feels a little tired this morning, denies any pain or discomfort otherwise.  Patient does not have a plausible explanation as to why he fell yesterday night.    Objective:     Vitals:   Temp (24hrs), Av.9 °F (37.2 °C), Min:98 °F (36.7 °C), Max:100.4 °F (38 °C)    Temp:  [98 °F (36.7 °C)-100.4 °F (38 °C)] 100.4 °F (38 °C)  HR:  [] 93  Resp:  [18-20] 18  BP: (100-129)/(52-76) 100/58  SpO2:  [83 %-100 %] 100 %  Body mass index is 28.25 kg/m².     Input and Output Summary (last 24 hours):     Intake/Output Summary (Last 24 hours) at 3/5/2024 0942  Last data filed at 3/4/2024 1816  Gross per 24 hour   Intake 1240 ml   Output 350 ml   Net 890 ml       Physical Exam:   Physical Exam  Vitals and nursing note reviewed.   Constitutional:       General: He is not in acute distress.     Appearance: Normal appearance. He is not ill-appearing.   HENT:      Head: Normocephalic and atraumatic.      Nose: Nose normal.   Eyes:      Extraocular Movements: Extraocular movements intact.      Pupils: Pupils are equal, round, and reactive to light.   Cardiovascular:      Rate and Rhythm: Normal rate and regular rhythm.      Pulses: Normal pulses.      Heart sounds: Normal heart sounds. No murmur heard.     No friction rub. No gallop.   Pulmonary:      Effort: Pulmonary effort is normal.      Breath sounds: Normal breath sounds.   Abdominal:      General: There is no distension.      Palpations: Abdomen is soft. There is no mass.      Tenderness: There is no abdominal tenderness. There is no guarding or rebound.   Musculoskeletal:         General: No  swelling or tenderness. Normal range of motion.      Cervical back: Normal range of motion and neck supple. No rigidity. No muscular tenderness.      Right lower leg: No edema.      Left lower leg: No edema.   Skin:     General: Skin is warm.      Capillary Refill: Capillary refill takes less than 2 seconds.      Findings: No erythema or rash.   Neurological:      General: No focal deficit present.      Mental Status: He is alert and oriented to person, place, and time. Mental status is at baseline.   Psychiatric:         Mood and Affect: Mood normal.         Behavior: Behavior normal.          Additional Data:     Labs:  Results from last 7 days   Lab Units 03/05/24  0419   WBC Thousand/uL 15.83*   HEMOGLOBIN g/dL 11.7*   HEMATOCRIT % 35.1*   PLATELETS Thousands/uL 223   NEUTROS PCT % 84*   LYMPHS PCT % 6*   MONOS PCT % 9   EOS PCT % 0     Results from last 7 days   Lab Units 03/05/24  0419   SODIUM mmol/L 136   POTASSIUM mmol/L 3.9   CHLORIDE mmol/L 102   CO2 mmol/L 23   BUN mg/dL 19   CREATININE mg/dL 1.31*   ANION GAP mmol/L 11   CALCIUM mg/dL 8.5   ALBUMIN g/dL 3.6   TOTAL BILIRUBIN mg/dL 0.44   ALK PHOS U/L 74   ALT U/L 6*   AST U/L 7*   GLUCOSE RANDOM mg/dL 126                       Lines/Drains:  Invasive Devices       Peripheral Intravenous Line  Duration             Peripheral IV 03/04/24 Distal;Right;Upper;Ventral (anterior) Arm <1 day                          Imaging: Reviewed radiology reports from this admission including: CT head    Recent Cultures (last 7 days):   Results from last 7 days   Lab Units 03/04/24  1400 03/04/24  1316   BLOOD CULTURE  Received in Microbiology Lab. Culture in Progress. Received in Microbiology Lab. Culture in Progress.       Last 24 Hours Medication List:   Current Facility-Administered Medications   Medication Dose Route Frequency Provider Last Rate    acetaminophen  650 mg Oral Q6H PRN Denver Ruiz MD      carBAMazepine  400 mg Oral TID Denver Ruiz MD       docusate sodium  100 mg Oral BID Denver Ruiz MD      enoxaparin  40 mg Subcutaneous Daily Denver Ruiz MD      levETIRAcetam  500 mg Oral BID Denver Ruiz MD      levothyroxine  25 mcg Oral Early Morning Denver Ruiz MD      lisinopril  5 mg Oral Daily Denver Ruiz MD      ondansetron  4 mg Intravenous Q6H PRN Denver Ruiz MD          Today, Patient Was Seen By: Denver Ruiz MD    **Please Note: This note may have been constructed using a voice recognition system.**

## 2024-03-05 NOTE — QUICK NOTE
Notified by nursing patient with a fall while trying to urinate.  He was found down by nursing on his right side. He has small skin tear on left fore arm. Reports pain in bilateral knees. Nursing notes to be more confusion that normal    CT head.  Xray bilateral knees.

## 2024-03-05 NOTE — CONSULTS
TeleConsultation - Neurology   Jose Roberto Herring 73 y.o. male MRN: [id number]   Encounter: [id number]      REQUIRED DOCUMENTATION:     1. This service was provided via Telemedicine.  2. Provider located in [place].  3. TeleMed provider: Shahbaz Maya MD.  4. Identify all parties in room with patient during tele consult:  Wife, RN  5. After connecting through televideo, patient was identified by name and date of birth and assistant checked wristband.  Patient was then informed that this was a Telemedicine visit and that the exam was being conducted confidentially over secure lines. My office door was closed. No one else was in the room.  Patient acknowledged consent and understanding of privacy and security of the Telemedicine visit, and gave us permission to have the assistant stay in the room in order to assist with the history and to conduct the exam.  I informed the patient that I have reviewed their record in Epic and presented the opportunity for them to ask any questions regarding the visit today.  The patient agreed to participate.       Assessment/Plan   Assessment:  Seizure disorder:  Possible breakthrough seizure. RRT note mentioned acute encephalopathy and hypotension, along with tremors.    Patient is currently admitted for respiratory failure with hypoxemia, no clear etiology. He presented to the hospital after a syncopal event after urination, believed to be vasovagal event.  Patient is confused, likely due to toxic-metabolic encephalopathy.      Plan:  Considering that no clear seizure was described, and that he remained stable for many years on current meds, we will withhold off changing his meds med at this time. We agree with obtaining EEG. If episode recur, then he will need video-EEG      History of Present Illness     Reason for Consult / Principal Problem: seizure  Hx and PE limited by: mental status  HPI: Jose Roberto Herring is a 73 y.o. male who presents with a fall after hoing to the bathroom  yesterday. While here, he was found to be have significant hypoxia O2< 88%. He was admitted, and over the night he had a fall again and appeared mildly confused.  This morning, RRT was called as the patient became unresponsive. By the time RRT arrived, patient  was awake and oriented with normal exam. He had no incontinence. No seizure activities.   Wife reported to RRT team that he has been having similar brief events since January.    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), followed by neurology at [facility], and controlled on Carbamazepine 400 mg TID and Levetiracetam 1000 mg BID    Inpatient consult to Neurology  Consult performed by: Shahbaz Maya MD  Consult ordered by: Denver Ruiz MD          Review of Systems    Historical Information   Past Medical History:   Diagnosis Date    Ambulates with cane     Anemia     Arthritis     BPH (benign prostatic hyperplasia)     Cancer (HCC)     bladder    Chronic kidney disease     Chronic pain disorder     COPD (chronic obstructive pulmonary disease) (HCC)     COVID 12/29/2023    hospitalized    Disease of thyroid gland     Falls 01/2024    Gross hematuria     Hearing loss     Hyperlipidemia     Hypertension     Hypothyroid     Lesion of bladder     Low back pain     Malignant neoplasm of overlapping sites of bladder (HCC)     Malignant neoplasm of right kidney, except renal pelvis (HCC)     Prediabetes     Renal cyst     Right renal mass     Sciatica     Seizures (HCC)     last seizure 14 yrs ago    Urethral stricture     Wears glasses     reading     Past Surgical History:   Procedure Laterality Date    COLONOSCOPY      CYSTOSCOPY  2012, 2013, 2014    CYSTOSCOPY N/A 12/22/2017    Procedure: INTRAVESICAL MITOMYCIN;  Surgeon: Marlon Bernal MD;  Location: [facility];  Service: Urology    CYSTOSCOPY W/ RETROGRADES Bilateral 2012    PARTIAL NEPHRECTOMY Right 2013    NM CYSTO BLADDER W/URETERAL  CATHETERIZATION Bilateral 2018    Procedure: CYSTO, RETROGRADE PYELOGRAM;  Surgeon: Marlon Bernal MD;  Location[facility];  Service: Urology    UT CYSTOURETHROSCOPY W/DEST &/RMVL MED BLADDER RUDY N/A 2017    Procedure: TRANSURETHRAL RESECTION OF BLADDER TUMOR (TURBT);  Surgeon: Marlon Bernal MD;  [facility];  Service: Urology    UT CYSTOURETHROSCOPY W/DEST &/RMVL MED BLADDER RUDY N/A 2018    Procedure: TURBT;  Surgeon: Marlon Bernal MD;  [facility];  Service: Urology    UT CYSTOURETHROSCOPY W/DEST &/RMVL MED BLADDER RUDY N/A 2024    Procedure: CYSTOSCOPY; DVIU; TURBT;  Surgeon: Rasheed Motley MD;  Location: [facility];  Service: Urology    RENAL CYST EXCISION      questionable malignancy    TRANSURETHRAL RESECTION OF PROSTATE  2012    URETEROSCOPY Right 2024    Procedure: URETEROSCOPY; DILATION;  Surgeon: Rasheed Motley MD;  Location: [facility];  Service: Urology     Social History   Social History     Substance and Sexual Activity   Alcohol Use Not Currently    Comment: Former drinker.     Social History     Substance and Sexual Activity   Drug Use No     Social History     Tobacco Use   Smoking Status Former    Current packs/day: 0.00    Average packs/day: 2.0 packs/day for 40.0 years (80.0 ttl pk-yrs)    Types: Cigarettes    Start date: 1974    Quit date: 2014    Years since quittin.2   Smokeless Tobacco Never     Family History: non-contributory    Review of previous medical records was completed.     Meds/Allergies   PTA meds:   Prior to Admission Medications   Prescriptions Last Dose Informant Patient Reported? Taking?   Cyanocobalamin (VITAMIN B-12 PO) 3/4/2024 Self Yes Yes   Sig: Take 1 tablet by mouth daily   Omega-3 Fatty Acids (FISH OIL PO) Not Taking Self Yes No   Sig: Take 1 capsule by mouth in the morning   Patient not taking: Reported on 3/4/2024   acetaminophen (TYLENOL) 325 mg tablet Not Taking Self Yes No   Sig: Take  "650 mg by mouth every 6 (six) hours as needed for mild pain   Patient not taking: Reported on 3/4/2024   aspirin (ECOTRIN LOW STRENGTH) 81 mg EC tablet 3/4/2024 Self Yes Yes   Sig: Take 81 mg by mouth daily   carBAMazepine (TEGretol XR) 400 mg 12 hr tablet 3/4/2024 Self Yes Yes   Sig: Take 400 mg by mouth 3 (three) times a day   ferrous sulfate 324 (65 Fe) mg 3/4/2024  No Yes   Sig: Take 1 PO BID before a meal   levETIRAcetam (KEPPRA) 500 mg tablet 3/4/2024  Yes Yes   Sig: Take 500 mg by mouth 2 (two) times a day   levothyroxine 75 mcg tablet 3/4/2024 Self Yes Yes   Sig: Take 25 mcg by mouth daily Takes 50mcg alternating with 25mcg   lisinopril (ZESTRIL) 5 mg tablet 3/4/2024  Yes Yes   Sig: Take 5 mg by mouth daily   pantoprazole (PROTONIX) 40 mg tablet   No No   Sig: Take 1 tablet (40 mg total) by mouth 2 (two) times a day   rosuvastatin (CRESTOR) 20 MG tablet 3/4/2024 Self Yes Yes   Sig: Take 20 mg by mouth daily      Facility-Administered Medications: None       Allergies   Allergen Reactions    Bactrim [Sulfamethoxazole-Trimethoprim] Itching, Hives and Rash     Rash, itching    Atorvastatin Drowsiness       Objective   Vitals:Blood pressure (!) 86/58, pulse 101, temperature (!) 102.2 °F (39 °C), resp. rate 20, height 6' 2\" (1.88 m), weight 99.8 kg (220 lb), SpO2 95%.,Body mass index is 28.25 kg/m².    Intake/Output Summary (Last 24 hours) at 3/5/2024 1401  Last data filed at 3/4/2024 1816  Gross per 24 hour   Intake 1240 ml   Output 350 ml   Net 890 ml       Invasive Devices:   Invasive Devices       Peripheral Intravenous Line  Duration             Peripheral IV 03/04/24 Distal;Right;Upper;Ventral (anterior) Arm 1 day                    Physical Exam NAD  Skin: no seen lesions  HEENT: ATNC  Chest: breathing comfortably on room air  GI: no apparent distension.    Neurologic Exam  Alert and oriented for self, place, but not year (said 2014). Memory appears intact to recent and remote events. There is some " reduction in attention span.  Language is intact to comprehension and expression. No neglect. Speech is normal. EOMI. Pupils are symmetric. Visual field appears intact. Face is symmetric. Tongue is midline. Able to move all extremities against gravity. No dysmetria noted.      Lab Results: CBC:   Results from last 7 days   Lab Units 03/05/24  0419 03/04/24  1316   WBC Thousand/uL 15.83* 12.77*   RBC Million/uL 3.61* 3.98   HEMOGLOBIN g/dL 11.7* 12.8   HEMATOCRIT % 35.1* 39.3   MCV fL 97 99*   PLATELETS Thousands/uL 223 286   , BMP/CMP:   Results from last 7 days   Lab Units 03/05/24  0419 03/04/24  1316   SODIUM mmol/L 136 135   POTASSIUM mmol/L 3.9 4.3   CHLORIDE mmol/L 102 99   CO2 mmol/L 23 25   BUN mg/dL 19 21   CREATININE mg/dL 1.31* 1.35*   CALCIUM mg/dL 8.5 8.7   AST U/L 7* 9*   ALT U/L 6* 6*   ALK PHOS U/L 74 79   EGFR ml/min/1.73sq m 53 51   , Vitamin B12:   , HgBA1C:   , Coagulation:   , Lipid Profile:     Imaging Studies: I have personally reviewed pertinent films in PACS  EKG, Pathology, and Other Studies: I have personally reviewed pertinent reports.    VTE Prophylaxis: Sequential compression device (Venodyne)     Code Status: Level 3 - DNAR and DNI  Advance Directive and Living Will:      Power of :    POLST:      Counseling / Coordination of Care  N/A

## 2024-03-05 NOTE — PLAN OF CARE
Problem: OCCUPATIONAL THERAPY ADULT  Goal: Performs self-care activities at highest level of function for planned discharge setting.  See evaluation for individualized goals.  Description: Treatment Interventions: ADL retraining, Functional transfer training, UE strengthening/ROM, Endurance training, Patient/family training, Equipment evaluation/education, Compensatory technique education, Energy conservation, Activityengagement, Cognitive reorientation    See flowsheet documentation for full assessment, interventions and recommendations.   Note: Limitation: Decreased ADL status, Decreased UE strength, Decreased Safe judgement during ADL, Decreased endurance, Decreased self-care trans, Decreased high-level ADLs, Decreased cognition  Prognosis: Fair  Assessment: Pt is a 73 y.o. male seen for OT evaluation s/p admit to [facility] on 3/4/2024 w/ Acute respiratory failure with hypoxia (HCC). Comorbidities affecting pt's functional performance at time of assessment include:  Fall, Shaking . Personal factors affecting pt at time of IE include:steps to enter environment, difficulty performing ADLS, limited insight into deficits, and decreased initiation and engagement . Prior to admission, pt was Mod I with ADLs. Upon evaluation: the following deficits impact occupational performance: decreased strength, decreased balance, decreased tolerance, impaired attention, impaired initiation, impaired memory, impaired sequencing, impaired problem solving, impulsivity, and decreased safety awareness. Pt to benefit from continued skilled OT tx while in the hospital to address deficits as defined above and maximize level of functional independence w ADL's and functional mobility. Occupational Performance areas to address include: bathing/shower, toilet hygiene, dressing, functional mobility, and clothing management. From OT standpoint, recommendation at time of d/c would be Level II (Moderate Resource Intensity.     Rehab Resource  Intensity Level, OT: II (Moderate Resource Intensity)     Lou Donnelly MS, OTR/L

## 2024-03-05 NOTE — NURSING NOTE
Pt from the er to room 208, oriented to the unit and the callbell, family at the pts bedside, sepsis alert called, pt bp improved but now trending down,, temp 103.4, ice packs applied, dr davis made aware, critical care at the bedside

## 2024-03-05 NOTE — PLAN OF CARE
Problem: PHYSICAL THERAPY ADULT  Goal: Performs mobility at highest level of function for planned discharge setting.  See evaluation for individualized goals.  Description: Treatment/Interventions: Functional transfer training, LE strengthening/ROM, Therapeutic exercise, Elevations, Endurance training, Cognitive reorientation, Patient/family training, Equipment eval/education, Bed mobility, Gait training, Compensatory technique education, Spoke to nursing, Spoke to case management          See flowsheet documentation for full assessment, interventions and recommendations.  Note: Prognosis: Fair  Problem List: Decreased strength, Decreased endurance, Impaired balance, Decreased mobility, Decreased coordination, Decreased cognition, Impaired judgement, Decreased safety awareness, Impaired hearing  Assessment: Pt is 73 y.o. male seen for PT evaluation s/p admit to  [facility]  on 3/4/2024 w/ Acute respiratory failure with hypoxia (HCC). PT consulted to assess pt's functional mobility and d/c needs. Order placed for PT eval and tx, w/ up and OOB as tolerated order. Comorbidities affecting pt's physical performance at time of assessment include: weakness, acute respiratory failure with hypoxia,fall, HTN,seizure disorder, leukemoid reaction,hypothyroidism . PTA, pt was independent w/ all functional mobility w/ SPC usage at times . Personal factors affecting pt at time of IE include: stairs to enter home, communication issues, inability to ambulate household distances, inability to navigate community distances, inability to navigate level surfaces w/o external assistance, unable to perform dynamic tasks in community, decreased cognition, positive fall history, decreased initiation and engagement, unable to perform physical activity, limited insight into impairments, and inability to perform ADLs. Please find objective findings from PT assessment regarding body systems outlined above with impairments and  limitations including weakness, impaired balance, decreased endurance, impaired coordination, gait deviations, decreased activity tolerance, decreased functional mobility tolerance, decreased safety awareness, impaired judgement, fall risk, and decreased cognition. The following objective measures performed on IE also reveal limitations: AM-PAC 6-Clicks low functioning score of 7.  From PT/mobility standpoint, recommendation at time of d/c would be of moderate resource intensity pending progress in order to facilitate return to PLOF.        Rehab Resource Intensity Level, PT: II (Moderate Resource Intensity)    See flowsheet documentation for full assessment.

## 2024-03-05 NOTE — SEPSIS NOTE
"  Sepsis Note   Jose Roberto Herring 73 y.o. male MRN: [id number]  Unit/Bed#: -01 Encounter: [id number]       Initial Sepsis Screening       Row Name 03/05/24 1653                Is the patient's history suggestive of a new or worsening infection? Yes (Proceed)  -MK        Suspected source of infection suspect infection, source unknown  -MK        Indicate SIRS criteria Hyperthemia > 38.3C (100.9F) OR Hypothermia <36C (96.8F);Tachycardia > 90 bpm;Leukocytosis (WBC > 69278 IJL) OR Leukopenia (WBC <4000 IJL) OR Bandemia (WBC >10% bands)  -MK        Are two or more of the above signs & symptoms of infection both present and new to the patient? Yes (Proceed)  -MK        Assess for evidence of organ dysfunction: Are any of the below criteria present within 6 hours of suspected infection and SIRS criteria that are NOT considered to be chronic conditions? SBP < 90;MAP < 65  -MK        Date of presentation of septic shock 03/05/24  -        Time of presentation of septic shock 1415  -MK        Fluid Resuscitation: 30 ml/kg IV fluid bolus will be given based on actual body weight  -        Is the patient is persistently hypotensive in the hour after fluid bolus administration? If yes, patient meets criteria for vasopressor use. YES  -        Sepsis Note: Click \"NEXT\" below (NOT \"close\") to generate sepsis note based on above information. YES (proceed by clicking \"NEXT\")  -                  User Key  (r) = Recorded By, (t) = Taken By, (c) = Cosigned By      Initials Name Provider Type     Denver Ruiz MD Physician                    Default Flowsheet Data (last 720 hours)       Sepsis Reassess       Row Name 03/05/24 1658                   Repeat Volume Status and Tissue Perfusion Assessment Performed    Date of Reassessment: 03/05/24  -        Time of Reassessment: 1615  -MK        Sepsis Reassessment Note: Click \"NEXT\" below (NOT \"close\") to generate sepsis reassessment note. YES (proceed by clicking " "\"NEXT\")  -        Repeat Volume Status and Tissue Perfusion Assessment Performed --                  User Key  (r) = Recorded By, (t) = Taken By, (c) = Cosigned By      Initials Name Provider Type    LUCIEN Ruiz MD Physician                    Body mass index is 27.77 kg/m².  Wt Readings from Last 1 Encounters:   03/05/24 98.1 kg (216 lb 4.3 oz)     IBW (Ideal Body Weight): 82.2 kg    Ideal body weight: 82.2 kg (181 lb 3.5 oz)  Adjusted ideal body weight: 88.6 kg (195 lb 3.8 oz)    "

## 2024-03-05 NOTE — PHYSICAL THERAPY NOTE
Physical Therapy Evaluation     Patient's Name: Jose Roberto Herring    Admitting Diagnosis  Fall in elderly patient [R29.6]  Shaking [R25.1]  Head injury [S09.90XA]    Problem List  Patient Active Problem List   Diagnosis    Malignant neoplasm of overlapping sites of bladder (HCC)    History of renal cell cancer    History of bladder cancer    Seizure disorder (HCC)    Essential hypertension    Hypothyroidism    Hyperlipidemia    Possible upper GI bleed    Anemia    LULA (acute kidney injury) (HCC)    Stomach ulcer    COVID-19    Hypomagnesemia    Malignant neoplasm of ureteric orifice (HCC)    Acute respiratory failure with hypoxia (HCC)    Fall    Leukemoid reaction    Abnormal CT scan, chest       Past Medical History  Past Medical History:   Diagnosis Date    Ambulates with cane     Anemia     Arthritis     BPH (benign prostatic hyperplasia)     Cancer (HCC)     bladder    Chronic kidney disease     Chronic pain disorder     COPD (chronic obstructive pulmonary disease) (HCC)     COVID 12/29/2023    hospitalized    Disease of thyroid gland     Falls 01/2024    Gross hematuria     Hearing loss     Hyperlipidemia     Hypertension     Hypothyroid     Lesion of bladder     Low back pain     Malignant neoplasm of overlapping sites of bladder (HCC)     Malignant neoplasm of right kidney, except renal pelvis (HCC)     Prediabetes     Renal cyst     Right renal mass     Sciatica     Seizures (HCC)     last seizure 14 yrs ago    Urethral stricture     Wears glasses     reading       Past Surgical History  Past Surgical History:   Procedure Laterality Date    COLONOSCOPY      CYSTOSCOPY  2012, 2013, 2014    CYSTOSCOPY N/A 12/22/2017    Procedure: INTRAVESICAL MITOMYCIN;  Surgeon: Marlon Bernal MD;  Location: [facility];  Service: Urology    CYSTOSCOPY W/ RETROGRADES Bilateral 2012    PARTIAL NEPHRECTOMY Right 2013    OK CYSTO BLADDER W/URETERAL CATHETERIZATION Bilateral 11/30/2018    Procedure: CYSTO, RETROGRADE  PYELOGRAM;  Surgeon: Marlon Bernal MD;  Location: [facility];  Service: Urology    MT CYSTOURETHROSCOPY W/DEST &/RMVL MED BLADDER RUDY N/A 12/22/2017    Procedure: TRANSURETHRAL RESECTION OF BLADDER TUMOR (TURBT);  Surgeon: Marlon Bernal MD;  Location: [facility];  Service: Urology    MT CYSTOURETHROSCOPY W/DEST &/RMVL MED BLADDER RUDY N/A 11/30/2018    Procedure: TURBT;  Surgeon: Marlon Bernal MD;  Location[facility];  Service: Urology    MT CYSTOURETHROSCOPY W/DEST &/RMVL MED BLADDER RUDY N/A 2/12/2024    Procedure: CYSTOSCOPY; DVIU; TURBT;  Surgeon: Rasheed Motley MD;  Location: [facility];  Service: Urology    RENAL CYST EXCISION      questionable malignancy    TRANSURETHRAL RESECTION OF PROSTATE  2012    URETEROSCOPY Right 2/12/2024    Procedure: URETEROSCOPY; DILATION;  Surgeon: Rasheed Motley MD;  Location: [facility];  Service: Urology        03/05/24 0759   PT Last Visit   PT Visit Date 03/05/24   Note Type   Note type Evaluation   Pain Assessment   Pain Assessment Tool 0-10   Pain Score No Pain  (denies)   Restrictions/Precautions   Weight Bearing Precautions Per Order No   Other Precautions Cognitive;Bed Alarm;Impulsive;Multiple lines;Telemetry;O2;Fall Risk;Hard of hearing   Home Living   Type of Home House  (bilevel)   Home Layout Performs ADLs on one level;Able to live on main level with bedroom/bathroom;Stairs to enter with rails   Bathroom Shower/Tub Walk-in shower   Bathroom Toilet Standard   Bathroom Equipment Grab bars in shower   Bathroom Accessibility Accessible   Home Equipment Cane  (baseline SPC usage)   Prior Function   Level of [place] Independent with ADLs;Independent with functional mobility;Needs assistance with IADLS   Lives With Spouse   Receives Help From Family   IADLs Family/Friend/Other provides transportation;Family/Friend/Other provides meals;Family/Friend/Other provides medication management   Falls in the last 6 months 1 to 4   Vocational Retired   General  "  Additional Pertinent History Myrisa OT present for co-assessment due to medical complexity, required skilled interventions of 2 clinicians for care delivery.   Family/Caregiver Present No   Cognition   Overall Cognitive Status Impaired   Arousal/Participation Responsive   Orientation Level Oriented to person;Oriented to place;Disoriented to time;Oriented to situation  ('I fell by the toilet\")   Memory Decreased recall of precautions;Decreased recall of recent events   Following Commands Follows one step commands with increased time or repetition   Comments Jose Roberto was agreeable to PT assessment, pleasant.   RLE Assessment   RLE Assessment X  (3/5 gross musculature)   LLE Assessment   LLE Assessment X  (3/5 gross musculature)   Vestibular   Spontaneous Nystagmus (-) no evidence of nystagmus at rest in room light   Gaze Holding Nystagmus (-) no evidence of nystagmus   Coordination   Movements are Fluid and Coordinated 0   Coordination and Movement Description Redirection and tactile cues of 2 required for safety, environmental awareness.   Bed Mobility   Supine to Sit 3  Moderate assistance   Additional items Assist x 2;HOB elevated;Bedrails;Impulsive;Verbal cues;LE management   Sit to Supine 2  Maximal assistance   Additional items Assist x 2;Bedrails;Increased time required;LE management;Verbal cues   Additional Comments Verbal cues for bedrail utilization, significant tactile cues for postural stabilization due to anterior/posterior sway.   Transfers   Sit to Stand 2  Maximal assistance   Additional items Assist x 2;Bedrails;Increased time required;Verbal cues  (RW utilization)   Stand to Sit 2  Maximal assistance   Additional items Assist x 2;Bedrails;Increased time required;Verbal cues   Stand pivot Unable to assess  (could not safely assess at this time)   Additional Comments Static standing sustained < 10 seconds at bedside. Time could not be progressed due to safety concerns, BLE instability. Jose Roberto was " assisted back to bed and repositioned accordingly.   Ambulation/Elevation   Gait pattern Not tested   Balance   Static Sitting Poor +   Dynamic Sitting Poor   Static Standing Poor -   Dynamic Standing Poor -   Endurance Deficit   Endurance Deficit Yes   Activity Tolerance   Activity Tolerance Patient limited by fatigue;Other (Comment)  (cognitive impairment)   Medical Staff Made Aware Yes, CM was informed of d/c disposition recommendation.   Nurse Made Aware Yes, nursing staff was informed of assistance levels.   Assessment   Prognosis Fair   Problem List Decreased strength;Decreased endurance;Impaired balance;Decreased mobility;Decreased coordination;Decreased cognition;Impaired judgement;Decreased safety awareness;Impaired hearing   Assessment Pt is 73 y.o. male seen for PT evaluation s/p admit to  [facility]  on 3/4/2024 w/ Acute respiratory failure with hypoxia (HCC). PT consulted to assess pt's functional mobility and d/c needs. Order placed for PT eval and tx, w/ up and OOB as tolerated order. Comorbidities affecting pt's physical performance at time of assessment include: weakness, acute respiratory failure with hypoxia,fall, HTN,seizure disorder, leukemoid reaction,hypothyroidism . PTA, pt was independent w/ all functional mobility w/ SPC usage at times . Personal factors affecting pt at time of IE include: stairs to enter home, communication issues, inability to ambulate household distances, inability to navigate community distances, inability to navigate level surfaces w/o external assistance, unable to perform dynamic tasks in community, decreased cognition, positive fall history, decreased initiation and engagement, unable to perform physical activity, limited insight into impairments, and inability to perform ADLs. Please find objective findings from PT assessment regarding body systems outlined above with impairments and limitations including weakness, impaired balance, decreased  endurance, impaired coordination, gait deviations, decreased activity tolerance, decreased functional mobility tolerance, decreased safety awareness, impaired judgement, fall risk, and decreased cognition. The following objective measures performed on IE also reveal limitations: AM-PAC 6-Clicks low functioning score of 7.  From PT/mobility standpoint, recommendation at time of d/c would be of moderate resource intensity pending progress in order to facilitate return to PLOF.   Goals   Patient Goals Jose Roberto was unable to provide due to cognitive impairment severity.   LTG Expiration Date 03/15/24   Long Term Goal #1 1.)Patient will complete bed mobility min A of 1 for decrease need for caregiver assistance, decrease burden of care. 2.) Patient will complete transfers mod A of 1 to decrease risk of falls, facilitate upright standing posture. 3.) BLE strength to greater than/equal to 3+/5 gross musculature to increase ability to safely transfer, control descent to chair. 4.) Patient will exhibit increase dynamic standing to Fair 2-3 minutes without LOB mod A of 1 to improve activity tolerance. 5.) Patient will exhibit increase dynamic ambulatory balance to Fair 25-30 feet w/AD mod A of 1 to improve ability to mobilize to toilet, chair and decrease risk for additional medical complications. 6.) Patient will exhibit good self monitoring and ability to follow 2 step commands to increase complexity of tasks and resume ADL's without LOB.   PT Treatment Day 0   Plan   Treatment/Interventions Functional transfer training;LE strengthening/ROM;Therapeutic exercise;Elevations;Endurance training;Cognitive reorientation;Patient/family training;Equipment eval/education;Bed mobility;Gait training;Compensatory technique education;Spoke to nursing;Spoke to case management   PT Frequency 3-5x/wk   Discharge Recommendation   Rehab Resource Intensity Level, PT II (Moderate Resource Intensity)   Additional Comments Upon conclusion, Jose Roberto  was resting in bed.The bed alarm was engaged and all needs within reach.   AM-PAC Basic Mobility Inpatient   Turning in Flat Bed Without Bedrails 2   Lying on Back to Sitting on Edge of Flat Bed Without Bedrails 1   Moving Bed to Chair 1   Standing Up From Chair Using Arms 1   Walk in Room 1   Climb 3-5 Stairs With Railing 1   Basic Mobility Inpatient Raw Score 7   Turning Head Towards Sound 2   Follow Simple Instructions 1   Low Function Basic Mobility Raw Score  10   Low Function Basic Mobility Standardized Score  14.65   Highest Level Of Mobility   -HLM Goal 2: Bed activities/Dependent transfer   -HLM Achieved 3: Sit at edge of bed     History/Personal Factors/Comorbidities: weakness, acute respiratory failure with hypoxia,fall, HTN,seizure disorder, leukemoid reaction,hypothyroidism    # of body structures/limitations: muscle weakness, activity intolerance,decreased endurance, impaired balance, gait deviations,impaired coordination, impaired cognition    Clinical presentation: unstable as seen in cognitive impairment severity, fall risk with positive fall history,hearing impairment, immobility at an increased risk of medical complications,BLE instability    Initial Assessment Time: 7510-7228    Sanjuana Rosales, PT

## 2024-03-05 NOTE — RAPID RESPONSE
Rapid Response Note  Jose Roberto Herring 73 y.o. male MRN: [id number]  Unit/Bed#: ED 28 Encounter: [id number]    Rapid Response Notification(s):   Response called date/time:  3/5/2024 1:17 PM  Response team arrival date/time:  3/5/2024 1:20 PM  Response end date/time:  3/5/2024 1:35 PM  Level of care:  [facility]  Rapid response location:  ED  Primary reason for rapid response call:  Acute change in BP and acute change in neuro status    Rapid Response Intervention(s):   Airway:  None  Breathing:  None  Circulation:  None  Fluids administered:  Normal saline  Medications administered:  None       Assessment:   Acute encephalopathy  Hypotension    Plan:   Given history, his presentation is concerning for breakthrough seizure  Recent ABG without respiratory acidosis   Would send ammonia  Consult neuro given tremor and concern for seizure activity   Seizure precautions   Consider EEG   Check orthostatics   BP improved with small IVF bolus      Rapid Response Outcome:   Transfer:  Remain on floor  Primary service notified of transfer: Yes    Code Status: Level 3 (DNAR and DNI)      Family notified of transfer: At bedside      Background/Situation:   Jose Roberto Herring is a 73 y.o. male RR called for acute encephalopathy and hypotension. On my arrival receiving IVF bolus. On my exam, mentation and BP are improved. He denies symptoms other than on and off tremor of b/l hands.     Review of Systems   Constitutional: Negative.    HENT: Negative.     Eyes: Negative.    Respiratory: Negative.     Cardiovascular: Negative.    Gastrointestinal: Negative.    Endocrine: Negative.    Genitourinary: Negative.    Musculoskeletal: Negative.    Skin: Negative.    Allergic/Immunologic: Negative.    Neurological: Negative.    Hematological: Negative.    Psychiatric/Behavioral: Negative.         Objective:   Vitals:    03/05/24 1158 03/05/24 1300 03/05/24 1305 03/05/24 1315   BP:  (!) 86/51 (!) 77/45 (!) 85/52   BP Location:  Left arm Left arm  "Left arm   Pulse:  104  105   Resp:  18 18 18   Temp: 100.3 °F (37.9 °C)      TempSrc: Temporal      SpO2:  100% 99% 99%   Weight:       Height:         Physical Exam  Constitutional:       General: He is not in acute distress.     Appearance: He is ill-appearing.   HENT:      Head: Normocephalic and atraumatic.      Mouth/Throat:      Mouth: Mucous membranes are moist.   Eyes:      Pupils: Pupils are equal, round, and reactive to light.   Cardiovascular:      Rate and Rhythm: Normal rate and regular rhythm.      Pulses: Normal pulses.      Heart sounds: Normal heart sounds. No murmur heard.     No friction rub. No gallop.   Pulmonary:      Effort: Pulmonary effort is normal. No respiratory distress.      Breath sounds: Normal breath sounds. No wheezing, rhonchi or rales.   Abdominal:      General: Bowel sounds are normal. There is no distension.      Palpations: Abdomen is soft.   Musculoskeletal:         General: No swelling. Normal range of motion.   Skin:     General: Skin is warm and dry.   Neurological:      General: No focal deficit present.      Mental Status: He is alert and oriented to person, place, and time.      Sensory: No sensory deficit.      Motor: No weakness.         Portions of the record may have been created with voice recognition software.  Occasional wrong word or \"sound a like\" substitutions may have occurred due to the inherent limitations of voice recognition software.  Read the chart carefully and recognize, using context, where substitutions have occurred.    LEEANN Yu      "

## 2024-03-05 NOTE — ED NOTES
Patient was found on the floor or room during rounding. Patient stated he was attempting to get up to use the bathroom and fell. Patient denies head strike. Patient appears confused and was hypoxic due to his O2 disconnecting from wall during fall. Provider advised of incident. Skin tear noted to the upper left forearm. Wound was cleansed and bandage applied. Patient was educated on use of call bell and voiced understanding. Side rails x2 in place, bed alarm turned on, call bell within reach.      Yuliana Alvarez RN  03/05/24 0015       Yuliana Alvarez RN  03/05/24 0025

## 2024-03-05 NOTE — PLAN OF CARE
Problem: Potential for Falls  Goal: Patient will remain free of falls  Description: INTERVENTIONS:  - Educate patient/family on patient safety including physical limitations  - Instruct patient to call for assistance with activity   - Consult OT/PT to assist with strengthening/mobility   - Keep Call bell within reach  - Keep bed low and locked with side rails adjusted as appropriate  - Keep care items and personal belongings within reach  - Initiate and maintain comfort rounds  - Make Fall Risk Sign visible to staff  - Offer Toileting every 2 Hours, in advance of need  - Initiate/Maintain bed alarm  - Obtain necessary fall risk management equipment: non skid socks  - Apply yellow socks and bracelet for high fall risk patients  - Consider moving patient to room near nurses station  Outcome: Progressing     Problem: PAIN - ADULT  Goal: Verbalizes/displays adequate comfort level or baseline comfort level  Description: Interventions:  - Encourage patient to monitor pain and request assistance  - Assess pain using appropriate pain scale  - Administer analgesics based on type and severity of pain and evaluate response  - Implement non-pharmacological measures as appropriate and evaluate response  - Consider cultural and social influences on pain and pain management  - Notify physician/advanced practitioner if interventions unsuccessful or patient reports new pain  Outcome: Progressing     Problem: INFECTION - ADULT  Goal: Absence or prevention of progression during hospitalization  Description: INTERVENTIONS:  - Assess and monitor for signs and symptoms of infection  - Monitor lab/diagnostic results  - Monitor all insertion sites, i.e. indwelling lines, tubes, and drains  - Monitor endotracheal if appropriate and nasal secretions for changes in amount and color  - [place] appropriate cooling/warming therapies per order  - Administer medications as ordered  - Instruct and encourage patient and family to use good hand  hygiene technique  - Identify and instruct in appropriate isolation precautions for identified infection/condition  Outcome: Progressing  Goal: Absence of fever/infection during neutropenic period  Description: INTERVENTIONS:  - Monitor WBC    Outcome: Progressing     Problem: SAFETY ADULT  Goal: Patient will remain free of falls  Description: INTERVENTIONS:  - Educate patient/family on patient safety including physical limitations  - Instruct patient to call for assistance with activity   - Consult OT/PT to assist with strengthening/mobility   - Keep Call bell within reach  - Keep bed low and locked with side rails adjusted as appropriate  - Keep care items and personal belongings within reach  - Initiate and maintain comfort rounds  - Make Fall Risk Sign visible to staff  - Offer Toileting every 2 Hours, in advance of need  - Initiate/Maintain bed alarm  - Obtain necessary fall risk management equipment: non skid socks  - Apply yellow socks and bracelet for high fall risk patients  - Consider moving patient to room near nurses station  Outcome: Progressing  Goal: Maintain or return to baseline ADL function  Description: INTERVENTIONS:  -  Assess patient's ability to carry out ADLs; assess patient's baseline for ADL function and identify physical deficits which impact ability to perform ADLs (bathing, care of mouth/teeth, toileting, grooming, dressing, etc.)  - Assess/evaluate cause of self-care deficits   - Assess range of motion  - Assess patient's mobility; develop plan if impaired  - Assess patient's need for assistive devices and provide as appropriate  - Encourage maximum independence but intervene and supervise when necessary  - Involve family in performance of ADLs  - Assess for home care needs following discharge   - Consider OT consult to assist with ADL evaluation and planning for discharge  - Provide patient education as appropriate  Outcome: Progressing  Goal: Maintains/Returns to pre admission  functional level  Description: INTERVENTIONS:  - Perform AM-PAC 6 Click Basic Mobility/ Daily Activity assessment daily.  - Set and communicate daily mobility goal to care team and patient/family/caregiver.   - Collaborate with rehabilitation services on mobility goals if consulted  - Perform Range of Motion 2 times a day.  - Reposition patient every 2 hours.  - Dangle patient 2 times a day  - Stand patient 2 times a day  - Ambulate patient 2 times a day  - Out of bed to chair 3 times a day   - Out of bed for meals 3 times a day  - Out of bed for toileting  - Record patient progress and toleration of activity level   Outcome: Progressing     Problem: DISCHARGE PLANNING  Goal: Discharge to home or other facility with appropriate resources  Description: INTERVENTIONS:  - Identify barriers to discharge w/patient and caregiver  - Arrange for needed discharge resources and transportation as appropriate  - Identify discharge learning needs (meds, wound care, etc.)  - Refer to Case Management Department for coordinating discharge planning if the patient needs post-hospital services based on physician/advanced practitioner order or complex needs related to functional status, cognitive ability, or social support system  Outcome: Progressing     Problem: Knowledge Deficit  Goal: Patient/family/caregiver demonstrates understanding of disease process, treatment plan, medications, and discharge instructions  Description: Complete learning assessment and assess knowledge base.  Interventions:  - Provide teaching at level of understanding  - Provide teaching via preferred learning methods  Outcome: Progressing     Problem: RESPIRATORY - ADULT  Goal: Achieves optimal ventilation and oxygenation  Description: INTERVENTIONS:  - Assess for changes in respiratory status  - Assess for changes in mentation and behavior  - Position to facilitate oxygenation and minimize respiratory effort  - Oxygen administered by appropriate delivery if  ordered  - Initiate smoking cessation education as indicated  - Encourage broncho-pulmonary hygiene including cough, deep breathe, Incentive Spirometry  - Assess the need for suctioning and aspirate as needed  - Assess and instruct to report SOB or any respiratory difficulty  - Respiratory Therapy support as indicated  Outcome: Progressing     Problem: Potential for Falls  Goal: Patient will remain free of falls  Description: INTERVENTIONS:  - Educate patient/family on patient safety including physical limitations  - Instruct patient to call for assistance with activity   - Consult OT/PT to assist with strengthening/mobility   - Keep Call bell within reach  - Keep bed low and locked with side rails adjusted as appropriate  - Keep care items and personal belongings within reach  - Initiate and maintain comfort rounds  - Make Fall Risk Sign visible to staff  - Offer Toileting every 2 Hours, in advance of need  - Initiate/Maintain bed alarm  - Obtain necessary fall risk management equipment: non skid socks  - Apply yellow socks and bracelet for high fall risk patients  - Consider moving patient to room near nurses station  Outcome: Progressing     Problem: PAIN - ADULT  Goal: Verbalizes/displays adequate comfort level or baseline comfort level  Description: Interventions:  - Encourage patient to monitor pain and request assistance  - Assess pain using appropriate pain scale  - Administer analgesics based on type and severity of pain and evaluate response  - Implement non-pharmacological measures as appropriate and evaluate response  - Consider cultural and social influences on pain and pain management  - Notify physician/advanced practitioner if interventions unsuccessful or patient reports new pain  Outcome: Progressing     Problem: INFECTION - ADULT  Goal: Absence or prevention of progression during hospitalization  Description: INTERVENTIONS:  - Assess and monitor for signs and symptoms of infection  - Monitor  lab/diagnostic results  - Monitor all insertion sites, i.e. indwelling lines, tubes, and drains  - Monitor endotracheal if appropriate and nasal secretions for changes in amount and color  - [place] appropriate cooling/warming therapies per order  - Administer medications as ordered  - Instruct and encourage patient and family to use good hand hygiene technique  - Identify and instruct in appropriate isolation precautions for identified infection/condition  Outcome: Progressing  Goal: Absence of fever/infection during neutropenic period  Description: INTERVENTIONS:  - Monitor WBC    Outcome: Progressing     Problem: SAFETY ADULT  Goal: Patient will remain free of falls  Description: INTERVENTIONS:  - Educate patient/family on patient safety including physical limitations  - Instruct patient to call for assistance with activity   - Consult OT/PT to assist with strengthening/mobility   - Keep Call bell within reach  - Keep bed low and locked with side rails adjusted as appropriate  - Keep care items and personal belongings within reach  - Initiate and maintain comfort rounds  - Make Fall Risk Sign visible to staff  - Offer Toileting every 2 Hours, in advance of need  - Initiate/Maintain bed alarm  - Obtain necessary fall risk management equipment: non skid socks  - Apply yellow socks and bracelet for high fall risk patients  - Consider moving patient to room near nurses station  Outcome: Progressing  Goal: Maintain or return to baseline ADL function  Description: INTERVENTIONS:  -  Assess patient's ability to carry out ADLs; assess patient's baseline for ADL function and identify physical deficits which impact ability to perform ADLs (bathing, care of mouth/teeth, toileting, grooming, dressing, etc.)  - Assess/evaluate cause of self-care deficits   - Assess range of motion  - Assess patient's mobility; develop plan if impaired  - Assess patient's need for assistive devices and provide as appropriate  - Encourage  maximum independence but intervene and supervise when necessary  - Involve family in performance of ADLs  - Assess for home care needs following discharge   - Consider OT consult to assist with ADL evaluation and planning for discharge  - Provide patient education as appropriate  Outcome: Progressing  Goal: Maintains/Returns to pre admission functional level  Description: INTERVENTIONS:  - Perform AM-PAC 6 Click Basic Mobility/ Daily Activity assessment daily.  - Set and communicate daily mobility goal to care team and patient/family/caregiver.   - Collaborate with rehabilitation services on mobility goals if consulted  - Perform Range of Motion 2 times a day.  - Reposition patient every 2 hours.  - Dangle patient 2 times a day  - Stand patient 2 times a day  - Ambulate patient 2 times a day  - Out of bed to chair 3 times a day   - Out of bed for meals 3 times a day  - Out of bed for toileting  - Record patient progress and toleration of activity level   Outcome: Progressing     Problem: DISCHARGE PLANNING  Goal: Discharge to home or other facility with appropriate resources  Description: INTERVENTIONS:  - Identify barriers to discharge w/patient and caregiver  - Arrange for needed discharge resources and transportation as appropriate  - Identify discharge learning needs (meds, wound care, etc.)  - Refer to Case Management Department for coordinating discharge planning if the patient needs post-hospital services based on physician/advanced practitioner order or complex needs related to functional status, cognitive ability, or social support system  Outcome: Progressing     Problem: Knowledge Deficit  Goal: Patient/family/caregiver demonstrates understanding of disease process, treatment plan, medications, and discharge instructions  Description: Complete learning assessment and assess knowledge base.  Interventions:  - Provide teaching at level of understanding  - Provide teaching via preferred learning  methods  Outcome: Progressing     Problem: RESPIRATORY - ADULT  Goal: Achieves optimal ventilation and oxygenation  Description: INTERVENTIONS:  - Assess for changes in respiratory status  - Assess for changes in mentation and behavior  - Position to facilitate oxygenation and minimize respiratory effort  - Oxygen administered by appropriate delivery if ordered  - Initiate smoking cessation education as indicated  - Encourage broncho-pulmonary hygiene including cough, deep breathe, Incentive Spirometry  - Assess the need for suctioning and aspirate as needed  - Assess and instruct to report SOB or any respiratory difficulty  - Respiratory Therapy support as indicated  Outcome: Progressing

## 2024-03-05 NOTE — ED NOTES
Patient stood and transferred to wheelchair without assistance to transfer to room 28     Brenda Flores RN  03/05/24 0011

## 2024-03-05 NOTE — NURSING NOTE
Temp down to 99.8, now oriented times 4, pt states that he feels better and in presently in bed in no acute distress eating dinner

## 2024-03-05 NOTE — NURSING NOTE
Pt temp was down to 99.2, started with shaking chills, increased to 102.3,ice packs reapplied, sat down to 85% 2 liters, o2 increased to 4, pt states that he doesn't feel good again, was oriented times 4 but is confused at this time, hospitalist made aware as well as critical care, orders received and pt taken to icu3, report to rn

## 2024-03-05 NOTE — CONSULTS
Pulmonary Consultation   Jose Roberto Herring 73 y.o. male MRN: [id number]   ED 28 Encounter: [id number]      Reason for consultation:   Abnormal CT of the chest      Requesting physician:   Denver Ruiz MD      Impressions:   Abnormal CT scan of the chest with left lower lobe basilar focal opacity.  This was seen initially on the CT of the chest in May 2023.  It was bigger and the CT of the chest done at the end of December 2023.  It is smaller but still there.  Lung nodules.  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  Asterixis.  This could be due to hypercapnia.  History of tobacco use.  Recent history of diagnosis of urinary bladder cancer.  Left hemidiaphragm elevation.  This was present since May 2023.  Recent COVID-19 infection.    Recommendations:  Arterial blood gas.  Repeat CT of the chest without contrast in 6 months.    Discussed with Dr. Ruiz.        History of Present Illness   HPI:  Jose Roberto Herring is a 73 y.o. male who presented to the emergency room after a fall yesterday.  The wife was at the bedside gave most of the history.  She stated that he went to the bathroom about 6:30 in the morning and he fell after he finished urinating.  The rest of the day he was not himself.  He had chills and felt very cold.  He did not get warm until after about 2 hours later.  His appetite was not good.  She was concerned and brought him to the emergency room.  In the emergency room he had a workup which included CT of the chest.  The patient denies shortness of breath.  No cough or sputum production.  Denies chest pain.  Denies any history of chronic lung disease.  He has history of tobacco use and quit about 15 years ago.  The patient had problems with urinating.  He had stricture in his urethra.  In December he had cystoscopy done and a polyp was removed.  He had the Miguel catheter for about a week and it was removed.  He did not have issues urinating after that.    Review of  systems:  12 point review of systems was completed and was otherwise negative except as listed in HPI.      Historical Information   Past Medical History:   Diagnosis Date    Ambulates with cane     Anemia     Arthritis     BPH (benign prostatic hyperplasia)     Cancer (HCC)     bladder    Chronic kidney disease     Chronic pain disorder     COPD (chronic obstructive pulmonary disease) (HCC)     COVID 12/29/2023    hospitalized    Disease of thyroid gland     Falls 01/2024    Gross hematuria     Hearing loss     Hyperlipidemia     Hypertension     Hypothyroid     Lesion of bladder     Low back pain     Malignant neoplasm of overlapping sites of bladder (HCC)     Malignant neoplasm of right kidney, except renal pelvis (HCC)     Prediabetes     Renal cyst     Right renal mass     Sciatica     Seizures (HCC)     last seizure 14 yrs ago    Urethral stricture     Wears glasses     reading     Past Surgical History:   Procedure Laterality Date    COLONOSCOPY      CYSTOSCOPY  2012, 2013, 2014    CYSTOSCOPY N/A 12/22/2017    Procedure: INTRAVESICAL MITOMYCIN;  Surgeon: Marlon Bernal MD;  Location: [facility];  Service: Urology    CYSTOSCOPY W/ RETROGRADES Bilateral 2012    PARTIAL NEPHRECTOMY Right 2013    NV CYSTO BLADDER W/URETERAL CATHETERIZATION Bilateral 11/30/2018    Procedure: CYSTO, RETROGRADE PYELOGRAM;  Surgeon: Marlon Bernal MD;  Location: [facility];  Service: Urology    NV CYSTOURETHROSCOPY W/DEST &/RMVL MED BLADDER RUDY N/A 12/22/2017    Procedure: TRANSURETHRAL RESECTION OF BLADDER TUMOR (TURBT);  Surgeon: Marlon Bernal MD;  Location: [facility];  Service: Urology    NV CYSTOURETHROSCOPY W/DEST &/RMVL MED BLADDER RUDY N/A 11/30/2018    Procedure: TURBT;  Surgeon: Marlon Bernal MD;  Location: [facility];  Service: Urology    NV CYSTOURETHROSCOPY W/DEST &/RMVL MED BLADDER RUDY N/A 2/12/2024    Procedure: CYSTOSCOPY; DVIU; TURBT;  Surgeon: Rasheed Motley MD;  Location: [facility];  Service:  "Urology    RENAL CYST EXCISION      questionable malignancy    TRANSURETHRAL RESECTION OF PROSTATE  2012    URETEROSCOPY Right 2/12/2024    Procedure: URETEROSCOPY; DILATION;  Surgeon: Rasheed Motley MD;  Location: [facility];  Service: Urology     Family History   Problem Relation Age of Onset    Diabetes Family        Family History:  No family history of chronic lung disease    Social History:  The patient is  and lives with his wife.  He has about 35-pack-year smoking history and quit 15 years ago.  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  Also was a .      Meds/Allergies   Current Facility-Administered Medications   Medication Dose Route Frequency    acetaminophen (TYLENOL) tablet 650 mg  650 mg Oral Q6H PRN    carBAMazepine (TEGretol XR) 12 hr tablet 400 mg  400 mg Oral TID    docusate sodium (COLACE) capsule 100 mg  100 mg Oral BID    enoxaparin (LOVENOX) subcutaneous injection 40 mg  40 mg Subcutaneous Daily    levETIRAcetam (KEPPRA) tablet 500 mg  500 mg Oral BID    levothyroxine tablet 25 mcg  25 mcg Oral Early Morning    lisinopril (ZESTRIL) tablet 5 mg  5 mg Oral Daily    ondansetron (ZOFRAN) injection 4 mg  4 mg Intravenous Q6H PRN     (Not in a hospital admission)    Allergies   Allergen Reactions    Bactrim [Sulfamethoxazole-Trimethoprim] Itching, Hives and Rash     Rash, itching    Atorvastatin Drowsiness       Vitals: Blood pressure 100/58, pulse 93, temperature 100.4 °F (38 °C), temperature source Temporal, resp. rate 18, height 6' 2\" (1.88 m), weight 99.8 kg (220 lb), SpO2 100%.,      Intake/Output Summary (Last 24 hours) at 3/5/2024 1145  Last data filed at 3/4/2024 1816  Gross per 24 hour   Intake 1240 ml   Output 350 ml   Net 890 ml       Physical exam:        Head/eyes:    Normocephalic, without obvious abnormality, atraumatic,         PERRL, extraocular muscles intact, no scleral icterus    Nose:   Nares normal, septum midline, mucosa normal, no drainage    or sinus tenderness "   Throat:   Moist mucous membranes, no thrush   Neck:   Supple, trachea midline, no adenopathy; no carotid    bruit or JVD   Lungs:   Bilateral basal crackles.  No wheezing or rhonchi.        Heart:    Regular rate and rhythm, S1 and S2 normal, no murmur, rub   or gallop   Abdomen:     Soft, non-tender, bowel sounds active all four quadrants,     no masses, no organomegaly   Extremities:   Extremities normal, atraumatic, no cyanosis or edema   Skin:   Warm, dry, turgor normal, no rashes or lesions   Neurologic:    Asterixis.  Nonfocal.             Labs: CBC:   Lab Results   Component Value Date    WBC 15.83 (H) 03/05/2024    HGB 11.7 (L) 03/05/2024    HCT 35.1 (L) 03/05/2024    MCV 97 03/05/2024     03/05/2024    RBC 3.61 (L) 03/05/2024    MCH 32.4 03/05/2024    MCHC 33.3 03/05/2024    RDW 12.5 03/05/2024    MPV 9.5 03/05/2024    NRBC 0 03/05/2024   , CMP:   Lab Results   Component Value Date    SODIUM 136 03/05/2024    K 3.9 03/05/2024     03/05/2024    CO2 23 03/05/2024    BUN 19 03/05/2024    CREATININE 1.31 (H) 03/05/2024    CALCIUM 8.5 03/05/2024    AST 7 (L) 03/05/2024    ALT 6 (L) 03/05/2024    ALKPHOS 74 03/05/2024    EGFR 53 03/05/2024       Imaging and other studies: I have personally reviewed pertinent films in PACS CT of the chest is reviewed on the PACS system.  It showed left lower lobe basal focal opacity.  I have also reviewed the previous CT of the chest which was done number as well as the CT of the abdomen which included the lower chest from May 2023.  The opacity is first seen in May it was bigger in December and is a smaller now.  Also he has pulmonary nodules in the right upper lobe, right middle lobe and lingula.        Cristobal Auguste MD

## 2024-03-05 NOTE — SEPSIS NOTE
"  Sepsis Note   Jose Roberto Herring 73 y.o. male MRN: [id number]  Unit/Bed#: -01 Encounter: [id number]       Initial Sepsis Screening       Row Name 03/05/24 1653                Is the patient's history suggestive of a new or worsening infection? Yes (Proceed)  -MK        Suspected source of infection suspect infection, source unknown  -MK        Indicate SIRS criteria Hyperthemia > 38.3C (100.9F) OR Hypothermia <36C (96.8F);Tachycardia > 90 bpm;Leukocytosis (WBC > 11118 IJL) OR Leukopenia (WBC <4000 IJL) OR Bandemia (WBC >10% bands)  -MK        Are two or more of the above signs & symptoms of infection both present and new to the patient? Yes (Proceed)  -MK        Assess for evidence of organ dysfunction: Are any of the below criteria present within 6 hours of suspected infection and SIRS criteria that are NOT considered to be chronic conditions? SBP < 90;MAP < 65  -MK        Date of presentation of septic shock 03/05/24  -        Time of presentation of septic shock 1415  -MK        Fluid Resuscitation: 30 ml/kg IV fluid bolus will be given based on actual body weight  -MK        Is the patient is persistently hypotensive in the hour after fluid bolus administration? If yes, patient meets criteria for vasopressor use. YES  -        Sepsis Note: Click \"NEXT\" below (NOT \"close\") to generate sepsis note based on above information. YES (proceed by clicking \"NEXT\")  -                  User Key  (r) = Recorded By, (t) = Taken By, (c) = Cosigned By      Initials Name Provider Type     Denver Ruiz MD Physician                        Body mass index is 27.77 kg/m².  Wt Readings from Last 1 Encounters:   03/05/24 98.1 kg (216 lb 4.3 oz)     IBW (Ideal Body Weight): 82.2 kg    Ideal body weight: 82.2 kg (181 lb 3.5 oz)  Adjusted ideal body weight: 88.6 kg (195 lb 3.8 oz)    "

## 2024-03-06 ENCOUNTER — APPOINTMENT (INPATIENT)
Dept: RADIOLOGY | Facility: HOSPITAL | Age: 74
DRG: 177 | End: 2024-03-06
Payer: MEDICARE

## 2024-03-06 PROBLEM — A41.9 SEPSIS WITHOUT ACUTE ORGAN DYSFUNCTION (HCC): Status: ACTIVE | Noted: 2024-03-06

## 2024-03-06 LAB
ALBUMIN SERPL BCP-MCNC: 3.1 G/DL (ref 3.5–5)
ALP SERPL-CCNC: 58 U/L (ref 34–104)
ALT SERPL W P-5'-P-CCNC: 6 U/L (ref 7–52)
ANION GAP SERPL CALCULATED.3IONS-SCNC: 9 MMOL/L
AST SERPL W P-5'-P-CCNC: 15 U/L (ref 13–39)
ATRIAL RATE: 139 BPM
BASOPHILS # BLD AUTO: 0.06 THOUSANDS/ÂΜL (ref 0–0.1)
BASOPHILS NFR BLD AUTO: 0 % (ref 0–1)
BILIRUB SERPL-MCNC: 0.38 MG/DL (ref 0.2–1)
BUN SERPL-MCNC: 22 MG/DL (ref 5–25)
CALCIUM ALBUM COR SERPL-MCNC: 8.6 MG/DL (ref 8.3–10.1)
CALCIUM SERPL-MCNC: 7.9 MG/DL (ref 8.4–10.2)
CHLORIDE SERPL-SCNC: 106 MMOL/L (ref 96–108)
CO2 SERPL-SCNC: 25 MMOL/L (ref 21–32)
CREAT SERPL-MCNC: 1.23 MG/DL (ref 0.6–1.3)
CRP SERPL QL: 264.4 MG/L
EOSINOPHIL # BLD AUTO: 0.03 THOUSAND/ÂΜL (ref 0–0.61)
EOSINOPHIL NFR BLD AUTO: 0 % (ref 0–6)
ERYTHROCYTE [DISTWIDTH] IN BLOOD BY AUTOMATED COUNT: 12.8 % (ref 11.6–15.1)
GFR SERPL CREATININE-BSD FRML MDRD: 57 ML/MIN/1.73SQ M
GLUCOSE SERPL-MCNC: 113 MG/DL (ref 65–140)
HCT VFR BLD AUTO: 33.7 % (ref 36.5–49.3)
HGB BLD-MCNC: 10.7 G/DL (ref 12–17)
IMM GRANULOCYTES # BLD AUTO: 0.09 THOUSAND/UL (ref 0–0.2)
IMM GRANULOCYTES NFR BLD AUTO: 1 % (ref 0–2)
L PNEUMO1 AG UR QL IA.RAPID: NEGATIVE
LYMPHOCYTES # BLD AUTO: 1.29 THOUSANDS/ÂΜL (ref 0.6–4.47)
LYMPHOCYTES NFR BLD AUTO: 8 % (ref 14–44)
MCH RBC QN AUTO: 31.9 PG (ref 26.8–34.3)
MCHC RBC AUTO-ENTMCNC: 31.8 G/DL (ref 31.4–37.4)
MCV RBC AUTO: 101 FL (ref 82–98)
MONOCYTES # BLD AUTO: 1.51 THOUSAND/ÂΜL (ref 0.17–1.22)
MONOCYTES NFR BLD AUTO: 9 % (ref 4–12)
NEUTROPHILS # BLD AUTO: 13.4 THOUSANDS/ÂΜL (ref 1.85–7.62)
NEUTS SEG NFR BLD AUTO: 82 % (ref 43–75)
NRBC BLD AUTO-RTO: 0 /100 WBCS
P AXIS: 51 DEGREES
PLATELET # BLD AUTO: 194 THOUSANDS/UL (ref 149–390)
PMV BLD AUTO: 10.4 FL (ref 8.9–12.7)
POTASSIUM SERPL-SCNC: 4.2 MMOL/L (ref 3.5–5.3)
PR INTERVAL: 176 MS
PROCALCITONIN SERPL-MCNC: 2.77 NG/ML
PROT SERPL-MCNC: 6.2 G/DL (ref 6.4–8.4)
QRS AXIS: 58 DEGREES
QRSD INTERVAL: 84 MS
QT INTERVAL: 354 MS
QTC INTERVAL: 538 MS
RBC # BLD AUTO: 3.35 MILLION/UL (ref 3.88–5.62)
S PNEUM AG UR QL: NEGATIVE
SODIUM SERPL-SCNC: 140 MMOL/L (ref 135–147)
T WAVE AXIS: 57 DEGREES
VENTRICULAR RATE: 139 BPM
WBC # BLD AUTO: 16.38 THOUSAND/UL (ref 4.31–10.16)

## 2024-03-06 PROCEDURE — 87449 NOS EACH ORGANISM AG IA: CPT | Performed by: HOSPITALIST

## 2024-03-06 PROCEDURE — 84145 PROCALCITONIN (PCT): CPT | Performed by: PHYSICIAN ASSISTANT

## 2024-03-06 PROCEDURE — 71045 X-RAY EXAM CHEST 1 VIEW: CPT

## 2024-03-06 PROCEDURE — 99223 1ST HOSP IP/OBS HIGH 75: CPT | Performed by: INTERNAL MEDICINE

## 2024-03-06 PROCEDURE — 85025 COMPLETE CBC W/AUTO DIFF WBC: CPT | Performed by: HOSPITALIST

## 2024-03-06 PROCEDURE — 93010 ELECTROCARDIOGRAM REPORT: CPT | Performed by: INTERNAL MEDICINE

## 2024-03-06 PROCEDURE — 99233 SBSQ HOSP IP/OBS HIGH 50: CPT | Performed by: HOSPITALIST

## 2024-03-06 PROCEDURE — 87081 CULTURE SCREEN ONLY: CPT | Performed by: PHYSICIAN ASSISTANT

## 2024-03-06 PROCEDURE — 80053 COMPREHEN METABOLIC PANEL: CPT | Performed by: PHYSICIAN ASSISTANT

## 2024-03-06 PROCEDURE — 99232 SBSQ HOSP IP/OBS MODERATE 35: CPT | Performed by: INTERNAL MEDICINE

## 2024-03-06 PROCEDURE — 86140 C-REACTIVE PROTEIN: CPT | Performed by: PHYSICIAN ASSISTANT

## 2024-03-06 RX ORDER — CEFTRIAXONE 1 G/50ML
1000 INJECTION, SOLUTION INTRAVENOUS EVERY 24 HOURS
Status: DISCONTINUED | OUTPATIENT
Start: 2024-03-06 | End: 2024-03-08

## 2024-03-06 RX ADMIN — LISINOPRIL 5 MG: 5 TABLET ORAL at 08:34

## 2024-03-06 RX ADMIN — DOCUSATE SODIUM 100 MG: 100 CAPSULE, LIQUID FILLED ORAL at 17:10

## 2024-03-06 RX ADMIN — LEVETIRACETAM 500 MG: 500 TABLET, FILM COATED ORAL at 17:10

## 2024-03-06 RX ADMIN — CARBAMAZEPINE 400 MG: 200 TABLET, EXTENDED RELEASE ORAL at 08:33

## 2024-03-06 RX ADMIN — ACETAMINOPHEN 650 MG: 325 TABLET ORAL at 11:38

## 2024-03-06 RX ADMIN — CARBAMAZEPINE 400 MG: 200 TABLET, EXTENDED RELEASE ORAL at 23:34

## 2024-03-06 RX ADMIN — SODIUM CHLORIDE, SODIUM GLUCONATE, SODIUM ACETATE, POTASSIUM CHLORIDE, MAGNESIUM CHLORIDE, SODIUM PHOSPHATE, DIBASIC, AND POTASSIUM PHOSPHATE 75 ML/HR: .53; .5; .37; .037; .03; .012; .00082 INJECTION, SOLUTION INTRAVENOUS at 19:38

## 2024-03-06 RX ADMIN — CARBAMAZEPINE 400 MG: 200 TABLET, EXTENDED RELEASE ORAL at 17:10

## 2024-03-06 RX ADMIN — DOCUSATE SODIUM 100 MG: 100 CAPSULE, LIQUID FILLED ORAL at 08:34

## 2024-03-06 RX ADMIN — DEXAMETHASONE SODIUM PHOSPHATE 6 MG: 10 INJECTION, SOLUTION INTRAMUSCULAR; INTRAVENOUS at 05:46

## 2024-03-06 RX ADMIN — CEFTRIAXONE 1000 MG: 1 INJECTION, SOLUTION INTRAVENOUS at 08:48

## 2024-03-06 RX ADMIN — ENOXAPARIN SODIUM 40 MG: 40 INJECTION SUBCUTANEOUS at 08:34

## 2024-03-06 RX ADMIN — LEVOTHYROXINE SODIUM 25 MCG: 25 TABLET ORAL at 05:45

## 2024-03-06 RX ADMIN — CARBAMAZEPINE 400 MG: 200 TABLET, EXTENDED RELEASE ORAL at 00:01

## 2024-03-06 RX ADMIN — SODIUM CHLORIDE, SODIUM GLUCONATE, SODIUM ACETATE, POTASSIUM CHLORIDE, MAGNESIUM CHLORIDE, SODIUM PHOSPHATE, DIBASIC, AND POTASSIUM PHOSPHATE 75 ML/HR: .53; .5; .37; .037; .03; .012; .00082 INJECTION, SOLUTION INTRAVENOUS at 05:45

## 2024-03-06 RX ADMIN — LEVETIRACETAM 500 MG: 500 TABLET, FILM COATED ORAL at 08:34

## 2024-03-06 RX ADMIN — AZITHROMYCIN MONOHYDRATE 500 MG: 500 INJECTION, POWDER, LYOPHILIZED, FOR SOLUTION INTRAVENOUS at 09:25

## 2024-03-06 RX ADMIN — ACETAMINOPHEN 650 MG: 325 TABLET ORAL at 23:34

## 2024-03-06 RX ADMIN — ACETAMINOPHEN 650 MG: 325 TABLET ORAL at 00:42

## 2024-03-06 NOTE — CONSULTS
Consultation - [facility]   Jose Roberto Herring 73 y.o. male MRN: [id number]  Unit/Bed#: ICU 03-01 Encounter: [id number]      IMPRESSION & RECOMMENDATIONS:   1. Systemic inflammatory response syndrome. Likely sepsis, though source is unknown. Fever, leukocytosis, hypotension developed after admission on 3/5/24. Clinical picture concerning for occult bacteremia. Initial blood cultures from admission are negative. Repeat blood cultures are pending. COVID-19 PCR positive though significance unknown since he initially tested positive in Dec 2023. UA benign and findings in urinary tract on imaging are chronic. No acute findings on CT chest, abdomen or pelvis. Consider possibility of pneumonia given hypoxia and recent healthcare exposure, though CXR does not suggest this. Less likely CNS infection.  -continue IV ceftriaxone and azithromycin for now   -follow up urinary antigens, if legionella negative can d/c azithromycin   -ordered MRSA nares    -low threshold to broaden abx to cefepime/vanco if he becomes unstable   -follow-up blood cultures and adjust antibiotics as needed  -monitor temperature and hemodynamics  -recheck CBC to monitor response to treatment      2. Acute respiratory failure with hypoxia. Initially on 2L NC however following aggressive IVF resuscitation, now up to 4L NC. Patient is asymptomatic. Suspect this may have been more of chronic process as imaging from admission seems mostly stable without acute findings. Repeat CXR reviewed today with pleural effusions and possible venous congestion.   -consider diuresis   -follow up urinary antigens      3. Abnormal CT chest. Initial CT chest on admission showed persistent patchy airspace consolidation in the LLL, likely chronic atelectasis/scarring. Subsequent imaging shows opacity in posterior LLL decreased medially since 12/2023 though worse in size since May 2023. Pulmonology following.   -close pulmonology follow up ongoing   -serial  imaging per pulmonology     3. Ambulatory dysfunction. Initially presented with several falls. Consider possibility of syncopal episodes related to hypoxia.     4. Positive COVID-19 PCR. Per history, tested positive in Dec 2023. He is otherwise asymptomatic except for hypoxia. Low suspicion for true active covid infection currently.      5. Possible breakthrough seizure. Noted on 3/5/24 and likely due to #1. Has hx of Sz disorder on chronic AEDs. Neurology following.     Antibiotics:  D2 IV abx   D2 IV ceftriaxone     I have discussed the above management plan in detail with patient.     Above plan discussed in detail with Dr. Ruiz who is in agreement with plan to continue IV abx and to broaden if there are any changes in clinical status.     ID consult service will continue to follow.    HISTORY OF PRESENT ILLNESS:  Reason for Consult: leukocytosis     HPI: Jose Roberto Herring is a 73 y.o. year old male with Sz disorder, HTN who initiall presented on 3/4 with falls at home. Patients wife also reported increased weakness and periods where he was not acting like himself. She was concerned for fever since he felt warm as well. Patient did not specifically have any complaints himself on admission. He was found to be hypoxic and placed on 2 L NC. He had CT imaging of chest that showed chronic scarring. Trauma work up was also negative. He was admitted for ambulatory dysfunction and hypoxia. On 3/5 he developed fever and met sepsis criteria with unknown source. There was also concern for possible sz like episode at the time of fever and hypotension. Neurology was consulted. He responded to IVF resuscitation, approx 4L per sepsis measures. Per primary team, he developed confusion at time of fever. Patient himself denies cough, SOB, sputum production. Denies recent sick contacts but per his wife had covid in dec 2023 that he did not receive paxlovid for. He denies rash or wounds. No recent abx use. No recent travel. Compliant  with AEDs at baseline. In February he had urologic procedure for urethral stricture. He denies lower urinary tract sx currently. He continues to have increase in WBC. He responded to IVF resuscitation and IV abx. His initially blood cx are negative. CT A/P without acute findings. CXR today with chronic scarring and possible pleural effusions. He remains stable and asx on 4L NC. PCT is elevated. UA benign. No meningeal sxs on exam noted.     REVIEW OF SYSTEMS:  A complete review of systems is negative other than that noted in the HPI.    PAST MEDICAL HISTORY:  Past Medical History:   Diagnosis Date    Ambulates with cane     Anemia     Arthritis     BPH (benign prostatic hyperplasia)     Cancer (HCC)     bladder    Chronic kidney disease     Chronic pain disorder     COPD (chronic obstructive pulmonary disease) (HCC)     COVID 12/29/2023    hospitalized    Disease of thyroid gland     Falls 01/2024    Gross hematuria     Hearing loss     Hyperlipidemia     Hypertension     Hypothyroid     Lesion of bladder     Low back pain     Malignant neoplasm of overlapping sites of bladder (HCC)     Malignant neoplasm of right kidney, except renal pelvis (HCC)     Prediabetes     Renal cyst     Right renal mass     Sciatica     Seizures (HCC)     last seizure 14 yrs ago    Urethral stricture     Wears glasses     reading     Past Surgical History:   Procedure Laterality Date    COLONOSCOPY      CYSTOSCOPY  2012, 2013, 2014    CYSTOSCOPY N/A 12/22/2017    Procedure: INTRAVESICAL MITOMYCIN;  Surgeon: Marlon Bernal MD;  Location: [facility];  Service: Urology    CYSTOSCOPY W/ RETROGRADES Bilateral 2012    PARTIAL NEPHRECTOMY Right 2013    OH CYSTO BLADDER W/URETERAL CATHETERIZATION Bilateral 11/30/2018    Procedure: CYSTO, RETROGRADE PYELOGRAM;  Surgeon: Marlon Bernal MD;  Location[facility];  Service: Urology    OH CYSTOURETHROSCOPY W/DEST &/RMVL MED BLADDER RUDY N/A 12/22/2017    Procedure: TRANSURETHRAL RESECTION OF  BLADDER TUMOR (TURBT);  Surgeon: Marlon Bernal MD;  Location[facility];  Service: Urology    VT CYSTOURETHROSCOPY W/DEST &/RMVL MED BLADDER RUDY N/A 2018    Procedure: TURBT;  Surgeon: Marlon Bernal MD;  [facility];  Service: Urology    VT CYSTOURETHROSCOPY W/DEST &/RMVL MED BLADDER RUDY N/A 2024    Procedure: CYSTOSCOPY; DVIU; TURBT;  Surgeon: Rasheed Motley MD;  Location: [facility];  Service: Urology    RENAL CYST EXCISION      questionable malignancy    TRANSURETHRAL RESECTION OF PROSTATE  2012    URETEROSCOPY Right 2024    Procedure: URETEROSCOPY; DILATION;  Surgeon: Rasheed Motley MD;  Location: [facility];  Service: Urology       FAMILY HISTORY:  Non-contributory    SOCIAL HISTORY:  Social History   Social History     Substance and Sexual Activity   Alcohol Use Not Currently    Comment: Former drinker.     Social History     Substance and Sexual Activity   Drug Use No     Social History     Tobacco Use   Smoking Status Former    Current packs/day: 0.00    Average packs/day: 2.0 packs/day for 40.0 years (80.0 ttl pk-yrs)    Types: Cigarettes    Start date: 1974    Quit date: 2014    Years since quittin.2   Smokeless Tobacco Never       ALLERGIES:  Allergies   Allergen Reactions    Bactrim [Sulfamethoxazole-Trimethoprim] Itching, Hives and Rash     Rash, itching    Atorvastatin Drowsiness       MEDICATIONS:  All current active medications have been reviewed.    PHYSICAL EXAM:  Temp:  [99.2 °F (37.3 °C)-104.1 °F (40.1 °C)] 99.2 °F (37.3 °C)  HR:  [] 87  Resp:  [15-30] 15  BP: ()/(45-86) 111/54  SpO2:  [87 %-100 %] 100 %  Temp (24hrs), Av.3 °F (38.5 °C), Min:99.2 °F (37.3 °C), Max:104.1 °F (40.1 °C)  Current: Temperature: 99.2 °F (37.3 °C)    Intake/Output Summary (Last 24 hours) at 3/6/2024 0802  Last data filed at 3/6/2024 0600  Gross per 24 hour   Intake 4207.5 ml   Output 600 ml   Net 3607.5 ml       General Appearance:  Appearing  chronically ill, disheveled, though nontoxic, and in no distress sitting in bed talking to his nurse and eating breakfast.   Head:  Normocephalic, without obvious abnormality, atraumatic   Eyes:  Conjunctiva pink and sclera anicteric, both eyes   Nose: Nares normal, mucosa normal, no drainage   Throat: Oropharynx moist without lesions   Neck: Supple, symmetrical, no adenopathy, no tenderness/mass/nodules   Back:   Symmetric, no curvature, ROM normal.   Lungs:   Clear to auscultation bilaterally, respirations unlabored on 4 L NC    Chest Wall:  No tenderness or deformity   Heart:  RRR; no murmur, rub or gallop   Abdomen:   Soft, non-tender, non-distended, positive bowel sounds    Extremities: No cyanosis, clubbing or edema   Skin: No rashes or lesions. No draining wounds noted other than skin tears from falls.   : No CVA or suprapubic tenderness. Condom cath in place.    Neurologic: Alert and oriented times 3 though does not know the month, extremity strength 5/5 and symmetric; no meningeal sxs        LABS, IMAGING, & OTHER STUDIES:  Extensive review of the medical records in epic including review of the notes, radiographs, and laboratory results were reviewed personally as below.     Lab Results:  I have personally reviewed pertinent labs.  Results from last 7 days   Lab Units 03/06/24  0552 03/05/24  0419 03/04/24  1316   WBC Thousand/uL 16.38* 15.83* 12.77*   HEMOGLOBIN g/dL 10.7* 11.7* 12.8   PLATELETS Thousands/uL 194 223 286     Results from last 7 days   Lab Units 03/06/24  0552 03/05/24  0419 03/04/24  1316   SODIUM mmol/L 140 136 135   POTASSIUM mmol/L 4.2 3.9 4.3   CHLORIDE mmol/L 106 102 99   CO2 mmol/L 25 23 25   BUN mg/dL 22 19 21   CREATININE mg/dL 1.23 1.31* 1.35*   EGFR ml/min/1.73sq m 57 53 51   CALCIUM mg/dL 7.9* 8.5 8.7   AST U/L 15 7* 9*   ALT U/L 6* 6* 6*   ALK PHOS U/L 58 74 79     Results from last 7 days   Lab Units 03/05/24  1137 03/04/24  1400 03/04/24  1316   BLOOD CULTURE  Received in  Microbiology Lab. Culture in Progress.  Received in Microbiology Lab. Culture in Progress. No Growth at 24 hrs. No Growth at 24 hrs.     Results from last 7 days   Lab Units 03/06/24  0552 03/05/24  1137   PROCALCITONIN ng/ml 2.77* 0.30*     Results from last 7 days   Lab Units 03/06/24  0552 03/05/24  1137   CRP mg/L 264.4* 202.4*         Results from last 7 days   Lab Units 03/05/24  2226   D-DIMER QUANTITATIVE ug/ml FEU 1.61*       Lab interpretation/comments: elevated PCT, WBC slighty higher today    Culture data: blood cultures neg >24h, UA benign     Imaging Studies:   Imaging interpretation/comments: personally reviewed CT chest and CXR

## 2024-03-06 NOTE — CASE MANAGEMENT
Case Management Assessment & Discharge Planning Note    Patient name Jose Roberto Herring  Location ICU 03/ICU - MRN [id number]  : 1950 Date 3/6/2024       Current Admission Date: 3/4/2024  Current Admission Diagnosis:Acute respiratory failure with hypoxia (HCC)   Patient Active Problem List    Diagnosis Date Noted    Sepsis without acute organ dysfunction (HCC) 2024    Acute respiratory failure with hypoxia (HCC) 2024    Fall 2024    Leukemoid reaction 2024    Abnormal CT scan, chest 2024    Malignant neoplasm of ureteric orifice (HCC) 2024    Hypomagnesemia 2023    COVID-19 2023    Stomach ulcer 2023    Seizure disorder (HCC) 2023    Essential hypertension 2023    Hypothyroidism 2023    Hyperlipidemia 2023    Possible upper GI bleed 2023    Anemia 2023    LULA (acute kidney injury) (HCC) 2023    History of bladder cancer 2018    Malignant neoplasm of overlapping sites of bladder (HCC) 2018    History of renal cell cancer 2018      LOS (days): 1  Geometric Mean LOS (GMLOS) (days): 3.6  Days to GMLOS:2.5     OBJECTIVE:    Risk of Unplanned Readmission Score: 25.13      Current admission status: Inpatient    Preferred Pharmacy:   [id number] - [place], [place] - [address]  [address] [address]  Phone: [phone] Fax: [phone]    [facility] - [place], [place] - [address]  [address] [address]  Phone: [phone] Fax: [phone]    [id number] - [place] - [address]  [address] [address]  Phone: [phone] Fax: [phone]    Primary Care Provider: Nolan Manning MD    Primary Insurance: MEDICARE  Secondary Insurance: [facility]    ASSESSMENT:  Active Health Care Proxies    There are no active Health Care Proxies on file.       Advance Directives  Does patient have a  Health Care POA?: Yes  Does patient have Advance Directives?: Yes  Advance Directives: Power of  for health care, Living will  Primary Contact: Kenyatta Herring wife    Readmission Root Cause  30 Day Readmission: No    Patient Information  Admitted from:: Home  Mental Status: Alert  During Assessment patient was accompanied by: Not accompanied during assessment  Assessment information provided by:: Patient  Primary Caregiver: Self  Support Systems: Spouse/significant other  County of Residence: [place]  What [facility] [facility] do you live in?: [place]  Home entry access options. Select all that apply.: Stairs (Goes thru the garage)  Number of steps to enter home.: 6  Do the steps have railings?: Yes  Type of Current Residence: Bi-level  Upon entering residence, is there a bedroom on the main floor (no further steps)?: Yes  Upon entering residence, is there a bathroom on the main floor (no further steps)?: Yes  Living Arrangements: Lives w/ Spouse/significant other  Is patient a ?: Yes  Is patient active with [facility] ( Affairs)?: No    Activities of Daily Living Prior to Admission  Functional Status: Independent  Completes ADLs independently?: Yes  Ambulates independently?: Yes  Does patient use assisted devices?: Yes  Assisted Devices (DME) used: Straight Cane  Does patient currently own DME?: Yes  What DME does the patient currently own?: Straight Cane  Does patient have a history of Outpatient Therapy (PT/OT)?: No  Does the patient have a history of Short-Term Rehab?: No  Does patient have a history of HHC?: No  Does patient currently have HHC?: No    Patient Information Continued  Income Source: Pension/FDC  Does patient have prescription coverage?: Yes  Does patient receive dialysis treatments?: No  Does patient have a history of substance abuse?: No  Does patient have a history of Mental Health Diagnosis?: No    PHQ 2/9 Screening   Reviewed PHQ 2/9 Depression Screening Score?: No    Means of  Transportation  Means of Transport to Appts:: Family transport      Social Determinants of Health (SDOH)      Flowsheet Row Most Recent Value   Housing Stability    In the last 12 months, was there a time when you were not able to pay the mortgage or rent on time? N   In the last 12 months, how many places have you lived? 1   In the last 12 months, was there a time when you did not have a steady place to sleep or slept in a shelter (including now)? N   Transportation Needs    In the past 12 months, has lack of transportation kept you from medical appointments or from getting medications? no   In the past 12 months, has lack of transportation kept you from meetings, work, or from getting things needed for daily living? No   Food Insecurity    Within the past 12 months, you worried that your food would run out before you got the money to buy more. Never true   Within the past 12 months, the food you bought just didn't last and you didn't have money to get more. Never true   Utilities    In the past 12 months has the electric, gas, oil, or water company threatened to shut off services in your home? No            DISCHARGE DETAILS:    Discharge planning discussed with:: Pt  PT is recommended STR.  TC to pt wife who states she does not know if he will agree.  Will speak to pt about same.

## 2024-03-06 NOTE — ASSESSMENT & PLAN NOTE
Patient was noted to have an O2 sat of 83% at time of arrival into the emergency room  Oxygen requirements are between 2 and 4 L of supplemental oxygen via nasal cannula, patient is fluctuating  Unspecified exact etiology  Differential includes-  1. A right lower fibrosis as noted on CT chest PE study, PE was ruled out,  2.  Chronic left hemidiaphragm elevation   3.  Sepsis-secondary to unspecified exact etiology, perhaps COVID-19  4.  Abnormal CT chest-see below  Appreciate pulmonary input  Continue to wean oxygen as able

## 2024-03-06 NOTE — ASSESSMENT & PLAN NOTE
Continue Keppra, continue carbamazepine  Appreciate neurology input  We will check an EEG  No changes to antiepileptic drug therapy as of right now

## 2024-03-06 NOTE — PROGRESS NOTES
[facility]  Progress Note  Name: Jose Roberto Herring I  MRN: [id number]  Unit/Bed#: ICU 03-01 I Date of Admission: 3/4/2024   Date of Service: 3/6/2024 I Hospital Day: 1    Assessment/Plan   * Acute respiratory failure with hypoxia (HCC)  Assessment & Plan  Patient was noted to have an O2 sat of 83% at time of arrival into the emergency room  Oxygen requirements are between 2 and 4 L of supplemental oxygen via nasal cannula, patient is fluctuating  Unspecified exact etiology  Differential includes-  1. A right lower fibrosis as noted on CT chest PE study, PE was ruled out,  2.  Chronic left hemidiaphragm elevation   3.  Sepsis-secondary to unspecified exact etiology, perhaps COVID-19  4.  Abnormal CT chest-see below  Appreciate pulmonary input  Continue to wean oxygen as able    Sepsis without acute organ dysfunction (HCC)  Assessment & Plan  An RRT was called on 3/5/2024-see the full RRT note for additional details  Later on 3/5/2024-patient met sepsis criteria and was treated with IV fluid  Tmax over the past 24 hours-104.1, patient remains intermittently tachypneic, and tachycardic, patient also has a persistent leukocytosis  Patient was treated with IV fluid in accordance with the sepsis treatment protocol  Blood pressure stable at this time  Unspecified exact etiology of the sepsis  Infectious workup thus far:-  #1.  Blood cultures x 2 sets from 3/4/2024-no growth at 24 hours  #2.  Blood cultures x 2 sets from 3/5/2024-in progress  #3.  Testing for RSV, and influenza A/B is negative  #4.  Urinalysis does not suggest UTI  #5.  Testing for COVID-19 is positive, however the patient tested positive for COVID-19 back in December.  He never received treatment for COVID as per his wife, however the patient had been doing relatively okay at home during the months of January and February of this year  #6.  Will repeat a chest x-ray  #7.  Procalcitonin testing 0.30, 2.77  #8.  Will consult  infectious disease, will test the urine for Legionella and strep pneumo antigen  #9.  Will start broad-spectrum IV antibiotics-Rocephin and Zithromax to cover the possibility of a pneumonia based on the original CAT scan findings    Fall  Assessment & Plan  Initially the patient fell prior to coming into the hospital, upon arrival the trauma imaging reviewed-no acute pathology  Patient had another fall on the night of 3/4/2024 here in the hospital  All trauma imaging has been normal  Status post the PT and OT evaluation-they have deemed that the patient is a moderate resource intensity level 2 patient, patient would benefit from rehab  Case management to work on this aspect of the patient's care      Essential hypertension  Assessment & Plan  Blood pressure stable  Continue lisinopril    Hypothyroidism  Assessment & Plan  Continue home dosing of Synthroid    Seizure disorder (HCC)  Assessment & Plan  Continue Keppra, continue carbamazepine  Appreciate neurology input  We will check an EEG  No changes to antiepileptic drug therapy as of right now    Hyperlipidemia  Assessment & Plan  Statin on hold-patient is normally on Crestor, patient reports an allergy to what we have available in the house which is Lipitor  Okay to resume Crestor at time of discharge    Leukemoid reaction  Assessment & Plan  See the management as outlined above    Abnormal CT scan, chest  Assessment & Plan  ll-defined part solid 5.3 x 2.1 cm opacity in the posterior basal left lower lobe, decreased in density medially since 12/29/2023 but increased in size since May 2023. Recommend follow-up with a chest CT with no contrast in 6 months to exclude a slowly growing adenocarcinoma spectrum lesion. A few 9 mm and smaller right lung nodules. These can also be reevaluated in 6 months. Mild right lower lobe fibrosis.   Appreciate pulmonary input  No further inpatient testing, treatment, and/or workup is needed, patient to repeat a CAT scan of the chest  in 6 months as outlined above               VTE Pharmacologic Prophylaxis: VTE Score: 5 High Risk (Score >/= 5) - Pharmacological DVT Prophylaxis Ordered: enoxaparin (Lovenox). Sequential Compression Devices Ordered.    Mobility:   Basic Mobility Inpatient Raw Score: 8  JH-HLM Goal: 3: Sit at edge of bed  JH-HLM Achieved: 2: Bed activities/Dependent transfer  HLM Goal achieved. Continue to encourage appropriate mobility.    Patient Centered Rounds: I performed bedside rounds with nursing staff today.   Discussions with Specialists or Other Care Team Provider: Infectious disease, pulmonary, neurology, critical care, case management    Education and Discussions with Family / Patient: Updated  (wife) via phone.    Total Time Spent on Date of Encounter in care of patient: 45 mins. This time was spent on one or more of the following: performing physical exam; counseling and coordination of care; obtaining or reviewing history; documenting in the medical record; reviewing/ordering tests, medications or procedures; communicating with other healthcare professionals and discussing with patient's family/caregivers.    Current Length of Stay: 1 day(s)  Current Patient Status: Inpatient   Certification Statement: The patient will continue to require additional inpatient hospital stay due to the need for IV antibiotics, and an infectious disease evaluation  Discharge Plan: Anticipate discharge in >72 hrs to rehab facility.    Code Status: Level 3 - DNAR and DNI    Subjective:   Patient seen, resting in bed, appears comfortable at this time, is calm, pleasant, cooperative.  Patient is AAO x 2 almost 3, he could not tell me what month were not    Objective:     Vitals:   Temp (24hrs), Av.2 °F (38.4 °C), Min:99.2 °F (37.3 °C), Max:104.1 °F (40.1 °C)    Temp:  [99.2 °F (37.3 °C)-104.1 °F (40.1 °C)] 99.7 °F (37.6 °C)  HR:  [] 93  Resp:  [15-30] 19  BP: ()/(45-86) 118/57  SpO2:  [87 %-100 %] 92 %  Body  mass index is 28.53 kg/m².     Input and Output Summary (last 24 hours):     Intake/Output Summary (Last 24 hours) at 3/6/2024 0828  Last data filed at 3/6/2024 0800  Gross per 24 hour   Intake 4207.5 ml   Output 825 ml   Net 3382.5 ml       Physical Exam:   Physical Exam  Vitals and nursing note reviewed.   Constitutional:       General: He is not in acute distress.     Appearance: Normal appearance. He is not ill-appearing.   HENT:      Head: Normocephalic and atraumatic.      Nose: Nose normal.   Eyes:      Extraocular Movements: Extraocular movements intact.      Pupils: Pupils are equal, round, and reactive to light.   Cardiovascular:      Rate and Rhythm: Normal rate and regular rhythm.      Pulses: Normal pulses.      Heart sounds: Normal heart sounds. No murmur heard.     No friction rub. No gallop.   Pulmonary:      Effort: Pulmonary effort is normal.      Breath sounds: Normal breath sounds.   Abdominal:      General: There is no distension.      Palpations: Abdomen is soft. There is no mass.      Tenderness: There is no abdominal tenderness. There is no guarding or rebound.   Musculoskeletal:         General: No swelling or tenderness. Normal range of motion.      Cervical back: Normal range of motion and neck supple. No rigidity. No muscular tenderness.      Right lower leg: No edema.      Left lower leg: No edema.   Skin:     General: Skin is warm.      Capillary Refill: Capillary refill takes less than 2 seconds.      Findings: No erythema or rash.   Neurological:      General: No focal deficit present.      Mental Status: He is alert and oriented to person, place, and time. Mental status is at baseline.   Psychiatric:         Mood and Affect: Mood normal.         Behavior: Behavior normal.          Additional Data:     Labs:  Results from last 7 days   Lab Units 03/06/24  0552   WBC Thousand/uL 16.38*   HEMOGLOBIN g/dL 10.7*   HEMATOCRIT % 33.7*   PLATELETS Thousands/uL 194   NEUTROS PCT % 82*    LYMPHS PCT % 8*   MONOS PCT % 9   EOS PCT % 0     Results from last 7 days   Lab Units 03/06/24  0552   SODIUM mmol/L 140   POTASSIUM mmol/L 4.2   CHLORIDE mmol/L 106   CO2 mmol/L 25   BUN mg/dL 22   CREATININE mg/dL 1.23   ANION GAP mmol/L 9   CALCIUM mg/dL 7.9*   ALBUMIN g/dL 3.1*   TOTAL BILIRUBIN mg/dL 0.38   ALK PHOS U/L 58   ALT U/L 6*   AST U/L 15   GLUCOSE RANDOM mg/dL 113                 Results from last 7 days   Lab Units 03/06/24  0552 03/05/24  1445 03/05/24  1137   LACTIC ACID mmol/L  --  1.6  --    PROCALCITONIN ng/ml 2.77*  --  0.30*       Lines/Drains:  Invasive Devices       Peripheral Intravenous Line  Duration             Peripheral IV 03/04/24 Distal;Right;Upper;Ventral (anterior) Arm 1 day    Peripheral IV 03/05/24 Right;Ventral (anterior) Forearm <1 day              Drain  Duration             External Urinary Catheter Medium <1 day                          Imaging: Reviewed radiology reports from this admission including: CT head    Recent Cultures (last 7 days):   Results from last 7 days   Lab Units 03/05/24  1137 03/04/24  1400 03/04/24  1316   BLOOD CULTURE  Received in Microbiology Lab. Culture in Progress.  Received in Microbiology Lab. Culture in Progress. No Growth at 24 hrs. No Growth at 24 hrs.       Last 24 Hours Medication List:   Current Facility-Administered Medications   Medication Dose Route Frequency Provider Last Rate    acetaminophen  650 mg Oral Q6H PRN Denver Ruiz MD      carBAMazepine  400 mg Oral TID Denver Ruiz MD      dexamethasone  6 mg Intravenous Q24H Roseann Singh PA-C      docusate sodium  100 mg Oral BID Denver Ruiz MD      enoxaparin  40 mg Subcutaneous Daily Denver Ruiz MD      influenza vaccine  0.7 mL Intramuscular Once Denver Ruiz MD      levETIRAcetam  500 mg Oral BID Denver Ruiz MD      levothyroxine  25 mcg Oral Early Morning Denver Ruiz MD      lisinopril  5 mg Oral  Daily Denver Ruiz MD      multi-electrolyte  75 mL/hr Intravenous Continuous Denver Ruiz MD 75 mL/hr (03/06/24 0545)    ondansetron  4 mg Intravenous Q6H PRN Denver Ruiz MD          Today, Patient Was Seen By: Denver Ruiz MD    **Please Note: This note may have been constructed using a voice recognition system.**

## 2024-03-06 NOTE — ASSESSMENT & PLAN NOTE
Initially the patient fell prior to coming into the hospital, upon arrival the trauma imaging reviewed-no acute pathology  Patient had another fall on the night of 3/4/2024 here in the hospital  All trauma imaging has been normal  Status post the PT and OT evaluation-they have deemed that the patient is a moderate resource intensity level 2 patient, patient would benefit from rehab  Case management to work on this aspect of the patient's care

## 2024-03-06 NOTE — PLAN OF CARE

## 2024-03-06 NOTE — ASSESSMENT & PLAN NOTE
An RRT was called on 3/5/2024-see the full RRT note for additional details  Later on 3/5/2024-patient met sepsis criteria and was treated with IV fluid  Tmax over the past 24 hours-104.1, patient remains intermittently tachypneic, and tachycardic, patient also has a persistent leukocytosis  Patient was treated with IV fluid in accordance with the sepsis treatment protocol  Blood pressure stable at this time  Unspecified exact etiology of the sepsis  Infectious workup thus far:-  #1.  Blood cultures x 2 sets from 3/4/2024-no growth at 24 hours  #2.  Blood cultures x 2 sets from 3/5/2024-in progress  #3.  Testing for RSV, and influenza A/B is negative  #4.  Urinalysis does not suggest UTI  #5.  Testing for COVID-19 is positive, however the patient tested positive for COVID-19 back in December.  He never received treatment for COVID as per his wife, however the patient had been doing relatively okay at home during the months of January and February of this year  #6.  Will repeat a chest x-ray  #7.  Procalcitonin testing 0.30, 2.77  #8.  Will consult infectious disease  #9.  Will start broad-spectrum IV antibiotics-Rocephin and Zithromax to cover the possibility of a pneumonia based on the CAT scan findings

## 2024-03-06 NOTE — CASE MANAGEMENT
Case Management Discharge Planning Note    Patient name Jose Roberto Herring  Location ICU 03/ICU - MRN [id number]  : 1950 Date 3/6/2024       Current Admission Date: 3/4/2024  Current Admission Diagnosis:Acute respiratory failure with hypoxia (HCC)   Patient Active Problem List    Diagnosis Date Noted    Sepsis without acute organ dysfunction (HCC) 2024    Acute respiratory failure with hypoxia (HCC) 2024    Fall 2024    Leukemoid reaction 2024    Abnormal CT scan, chest 2024    Malignant neoplasm of ureteric orifice (HCC) 2024    Hypomagnesemia 2023    COVID-19 2023    Stomach ulcer 2023    Seizure disorder (HCC) 2023    Essential hypertension 2023    Hypothyroidism 2023    Hyperlipidemia 2023    Possible upper GI bleed 2023    Anemia 2023    LULA (acute kidney injury) (HCC) 2023    History of bladder cancer 2018    Malignant neoplasm of overlapping sites of bladder (HCC) 2018    History of renal cell cancer 2018      LOS (days): 1  Geometric Mean LOS (GMLOS) (days): 3.6  Days to GMLOS:2.5     OBJECTIVE:  Risk of Unplanned Readmission Score: 25.66         Current admission status: Inpatient   Preferred Pharmacy:   [id number] - [place], [place] - [address]  [address] [address]  Phone: [phone] Fax: [phone]    [facility] - [place], [place] - [address]  [address] [address]  Phone: [phone] Fax: [phone]    [id number] - [place], [place] - [address]  [address] [address]  Phone: [phone] Fax: [phone]    Primary Care Provider: Nolan Manning MD    Primary Insurance: MEDICARE  Secondary Insurance: [employer] [facility]    DISCHARGE DETAILS:  Wife at pt bedside and reports they are going to try to get a stair glide to make going up the steps easier.  Wife and pt still  discussing of they want STR vs HHC services.  Will continue to follow for care needs.

## 2024-03-06 NOTE — PROGRESS NOTES
"Progress Note - Pulmonary   Jose Roberto Herring 73 y.o. male MRN: [id number]  Unit/Bed#: ICU 03-01 Encounter: [id number]      Assessment:  Systemic inflammatory response without evidence of infection.  Cultures remain negative.  Abnormal CT scan with persistent left basal focal opacity.  Possible adenocarcinoma spectrum.  History of tobacco use.  Elevated left hemidiaphragm.  Recent COVID-19 infection.    Plan:  Out of bed to chair and increase activities as tolerated.  Incentive spirometry.  Wean off oxygen.  Maintain O2 saturation above 88%.    Discussed with Dr. Ruiz and Dr Osei    Subjective:   The patient is feeling better.  No overnight events.  Denies shortness of breath.  Denies pain.    Vitals: Blood pressure 105/54, pulse 85, temperature 98.9 °F (37.2 °C), temperature source Tympanic, resp. rate 19, height 6' 2\" (1.88 m), weight 101 kg (222 lb 3.6 oz), SpO2 95%., Body mass index is 28.53 kg/m².      Intake/Output Summary (Last 24 hours) at 3/6/2024 1157  Last data filed at 3/6/2024 1000  Gross per 24 hour   Intake 4327.5 ml   Output 865 ml   Net 3462.5 ml       Physical Exam  Gen: Awake, alert, oriented x 3, no acute distress  HEENT: Mucous membranes moist, no oral lesions, no thrush  NECK: No accessory muscle use, JVP not elevated  Cardiac: Regular, single S1, single S2, no murmurs, no rubs, no gallops  Lungs: Decreased breath sounds on the left base.  Abdomen: normoactive bowel sounds, soft nontender, nondistended, no rebound or rigidity, no guarding  Extremities: no cyanosis, no clubbing, no edema    Labs: CBC:   Lab Results   Component Value Date    WBC 16.38 (H) 03/06/2024    HGB 10.7 (L) 03/06/2024    HCT 33.7 (L) 03/06/2024     (H) 03/06/2024     03/06/2024    RBC 3.35 (L) 03/06/2024    MCH 31.9 03/06/2024    MCHC 31.8 03/06/2024    RDW 12.8 03/06/2024    MPV 10.4 03/06/2024    NRBC 0 03/06/2024   , CMP:   Lab Results   Component Value Date    SODIUM 140 03/06/2024    K 4.2 03/06/2024    "  03/06/2024    CO2 25 03/06/2024    BUN 22 03/06/2024    CREATININE 1.23 03/06/2024    CALCIUM 7.9 (L) 03/06/2024    AST 15 03/06/2024    ALT 6 (L) 03/06/2024    ALKPHOS 58 03/06/2024    EGFR 57 03/06/2024           Cristobal Auguste MD

## 2024-03-06 NOTE — PLAN OF CARE
Problem: PAIN - ADULT  Goal: Verbalizes/displays adequate comfort level or baseline comfort level  Description: Interventions:  - Encourage patient to monitor pain and request assistance  - Assess pain using appropriate pain scale  - Administer analgesics based on type and severity of pain and evaluate response  - Implement non-pharmacological measures as appropriate and evaluate response  - Consider cultural and social influences on pain and pain management  - Notify physician/advanced practitioner if interventions unsuccessful or patient reports new pain  Outcome: Progressing     Problem: RESPIRATORY - ADULT  Goal: Achieves optimal ventilation and oxygenation  Description: INTERVENTIONS:  - Assess for changes in respiratory status  - Assess for changes in mentation and behavior  - Position to facilitate oxygenation and minimize respiratory effort  - Oxygen administered by appropriate delivery if ordered  - Initiate smoking cessation education as indicated  - Encourage broncho-pulmonary hygiene including cough, deep breathe, Incentive Spirometry  - Assess the need for suctioning and aspirate as needed  - Assess and instruct to report SOB or any respiratory difficulty  - Respiratory Therapy support as indicated  Outcome: Progressing     Problem: DISCHARGE PLANNING  Goal: Discharge to home or other facility with appropriate resources  Description: INTERVENTIONS:  - Identify barriers to discharge w/patient and caregiver  - Arrange for needed discharge resources and transportation as appropriate  - Identify discharge learning needs (meds, wound care, etc.)  - Refer to Case Management Department for coordinating discharge planning if the patient needs post-hospital services based on physician/advanced practitioner order or complex needs related to functional status, cognitive ability, or social support system  Outcome: Progressing

## 2024-03-06 NOTE — ASSESSMENT & PLAN NOTE
ll-defined part solid 5.3 x 2.1 cm opacity in the posterior basal left lower lobe, decreased in density medially since 12/29/2023 but increased in size since May 2023. Recommend follow-up with a chest CT with no contrast in 6 months to exclude a slowly growing adenocarcinoma spectrum lesion. A few 9 mm and smaller right lung nodules. These can also be reevaluated in 6 months. Mild right lower lobe fibrosis.   Appreciate pulmonary input  No further inpatient testing, treatment, and/or workup is needed, patient to repeat a CAT scan of the chest in 6 months as outlined above

## 2024-03-07 LAB
ALBUMIN SERPL BCP-MCNC: 3.1 G/DL (ref 3.5–5)
ALP SERPL-CCNC: 56 U/L (ref 34–104)
ALT SERPL W P-5'-P-CCNC: 9 U/L (ref 7–52)
ANION GAP SERPL CALCULATED.3IONS-SCNC: 9 MMOL/L
AST SERPL W P-5'-P-CCNC: 20 U/L (ref 13–39)
BASOPHILS # BLD AUTO: 0.04 THOUSANDS/ÂΜL (ref 0–0.1)
BASOPHILS NFR BLD AUTO: 0 % (ref 0–1)
BILIRUB SERPL-MCNC: 0.37 MG/DL (ref 0.2–1)
BUN SERPL-MCNC: 21 MG/DL (ref 5–25)
CALCIUM ALBUM COR SERPL-MCNC: 9.1 MG/DL (ref 8.3–10.1)
CALCIUM SERPL-MCNC: 8.4 MG/DL (ref 8.4–10.2)
CHLORIDE SERPL-SCNC: 105 MMOL/L (ref 96–108)
CO2 SERPL-SCNC: 26 MMOL/L (ref 21–32)
CREAT SERPL-MCNC: 1.01 MG/DL (ref 0.6–1.3)
EOSINOPHIL # BLD AUTO: 0.37 THOUSAND/ÂΜL (ref 0–0.61)
EOSINOPHIL NFR BLD AUTO: 3 % (ref 0–6)
ERYTHROCYTE [DISTWIDTH] IN BLOOD BY AUTOMATED COUNT: 12.7 % (ref 11.6–15.1)
GFR SERPL CREATININE-BSD FRML MDRD: 73 ML/MIN/1.73SQ M
GLUCOSE SERPL-MCNC: 113 MG/DL (ref 65–140)
HCT VFR BLD AUTO: 33.7 % (ref 36.5–49.3)
HGB BLD-MCNC: 10.8 G/DL (ref 12–17)
IMM GRANULOCYTES # BLD AUTO: 0.05 THOUSAND/UL (ref 0–0.2)
IMM GRANULOCYTES NFR BLD AUTO: 0 % (ref 0–2)
LYMPHOCYTES # BLD AUTO: 0.78 THOUSANDS/ÂΜL (ref 0.6–4.47)
LYMPHOCYTES NFR BLD AUTO: 7 % (ref 14–44)
MCH RBC QN AUTO: 31.9 PG (ref 26.8–34.3)
MCHC RBC AUTO-ENTMCNC: 32 G/DL (ref 31.4–37.4)
MCV RBC AUTO: 99 FL (ref 82–98)
MONOCYTES # BLD AUTO: 1.02 THOUSAND/ÂΜL (ref 0.17–1.22)
MONOCYTES NFR BLD AUTO: 9 % (ref 4–12)
MRSA NOSE QL CULT: NORMAL
NEUTROPHILS # BLD AUTO: 9.55 THOUSANDS/ÂΜL (ref 1.85–7.62)
NEUTS SEG NFR BLD AUTO: 81 % (ref 43–75)
NRBC BLD AUTO-RTO: 0 /100 WBCS
PLATELET # BLD AUTO: 209 THOUSANDS/UL (ref 149–390)
PMV BLD AUTO: 10.8 FL (ref 8.9–12.7)
POTASSIUM SERPL-SCNC: 3.7 MMOL/L (ref 3.5–5.3)
PROCALCITONIN SERPL-MCNC: 2.08 NG/ML
PROT SERPL-MCNC: 6.3 G/DL (ref 6.4–8.4)
RBC # BLD AUTO: 3.39 MILLION/UL (ref 3.88–5.62)
SODIUM SERPL-SCNC: 140 MMOL/L (ref 135–147)
WBC # BLD AUTO: 11.81 THOUSAND/UL (ref 4.31–10.16)

## 2024-03-07 PROCEDURE — 85025 COMPLETE CBC W/AUTO DIFF WBC: CPT | Performed by: HOSPITALIST

## 2024-03-07 PROCEDURE — 80053 COMPREHEN METABOLIC PANEL: CPT | Performed by: HOSPITALIST

## 2024-03-07 PROCEDURE — 99232 SBSQ HOSP IP/OBS MODERATE 35: CPT | Performed by: INTERNAL MEDICINE

## 2024-03-07 PROCEDURE — 84145 PROCALCITONIN (PCT): CPT | Performed by: HOSPITALIST

## 2024-03-07 PROCEDURE — 99232 SBSQ HOSP IP/OBS MODERATE 35: CPT | Performed by: HOSPITALIST

## 2024-03-07 RX ORDER — FUROSEMIDE 10 MG/ML
40 INJECTION INTRAMUSCULAR; INTRAVENOUS ONCE
Status: COMPLETED | OUTPATIENT
Start: 2024-03-07 | End: 2024-03-07

## 2024-03-07 RX ADMIN — CARBAMAZEPINE 400 MG: 200 TABLET, EXTENDED RELEASE ORAL at 08:05

## 2024-03-07 RX ADMIN — DOCUSATE SODIUM 100 MG: 100 CAPSULE, LIQUID FILLED ORAL at 08:05

## 2024-03-07 RX ADMIN — LISINOPRIL 5 MG: 5 TABLET ORAL at 08:05

## 2024-03-07 RX ADMIN — LEVOTHYROXINE SODIUM 25 MCG: 25 TABLET ORAL at 06:05

## 2024-03-07 RX ADMIN — CEFTRIAXONE 1000 MG: 1 INJECTION, SOLUTION INTRAVENOUS at 08:05

## 2024-03-07 RX ADMIN — CARBAMAZEPINE 400 MG: 200 TABLET, EXTENDED RELEASE ORAL at 23:21

## 2024-03-07 RX ADMIN — DOCUSATE SODIUM 100 MG: 100 CAPSULE, LIQUID FILLED ORAL at 17:26

## 2024-03-07 RX ADMIN — DEXAMETHASONE SODIUM PHOSPHATE 6 MG: 10 INJECTION, SOLUTION INTRAMUSCULAR; INTRAVENOUS at 06:05

## 2024-03-07 RX ADMIN — LEVETIRACETAM 500 MG: 500 TABLET, FILM COATED ORAL at 17:26

## 2024-03-07 RX ADMIN — LEVETIRACETAM 500 MG: 500 TABLET, FILM COATED ORAL at 08:05

## 2024-03-07 RX ADMIN — CARBAMAZEPINE 400 MG: 200 TABLET, EXTENDED RELEASE ORAL at 17:00

## 2024-03-07 RX ADMIN — FUROSEMIDE 40 MG: 10 INJECTION, SOLUTION INTRAMUSCULAR; INTRAVENOUS at 08:05

## 2024-03-07 RX ADMIN — AZITHROMYCIN MONOHYDRATE 500 MG: 500 INJECTION, POWDER, LYOPHILIZED, FOR SOLUTION INTRAVENOUS at 09:32

## 2024-03-07 RX ADMIN — ENOXAPARIN SODIUM 40 MG: 40 INJECTION SUBCUTANEOUS at 08:05

## 2024-03-07 NOTE — PROGRESS NOTES
[facility]  Progress Note  Name: Jose Roberto Herring I  MRN: [id number]  Unit/Bed#: ICU 03-01 I Date of Admission: 3/4/2024   Date of Service: 3/7/2024 I Hospital Day: 2    Assessment/Plan   * Acute respiratory failure with hypoxia (HCC)  Assessment & Plan  Patient was noted to have an O2 sat of 83% at time of arrival into the emergency room  Significantly improved -patient is now down to 1 L of supplemental oxygen  Differential includes-  1. A right lower fibrosis as noted on CT chest PE study, PE was ruled out,  2.  Chronic left hemidiaphragm elevation   3.  Sepsis-secondary to unspecified exact etiology, perhaps COVID-19 versus the possibility of a left lower lobe pneumonia  4.  Abnormal CT chest-see below -possible adenocarcinoma  Appreciate pulmonary input  Continue to wean oxygen as able    Sepsis without acute organ dysfunction (HCC)  Assessment & Plan  Sepsis parameters have significantly improved  Acute toxic metabolic encephalopathy has resolved-this was secondary to sepsis  Infectious workup thus far:-  #1.  Blood cultures x 2 sets from 3/4/2024-no growth at 48 hours  #2.  Blood cultures x 2 sets from 3/5/2024-no growth at 24 hours  #3.  Testing for RSV, and influenza A/B is negative  #4.  Urinalysis does not suggest UTI  #5.  COVID-19 testing is positive-however the patient tested positive back in December of this year.  No treatment needed for COVID-19  #6.  Repeat chest x-ray revealed some pulmonary vascular congestion-will treat with a one-time dose of IV Lasix  #7.  Procalcitonin testing 0.30, 2.77, 2.08  #8.  Appreciate ID input  #9.  Continue ceftriaxone day 3, Zithromax day 2 for empiric treatment of a suspected left lower lobe pneumonia    Fall  Assessment & Plan  Initially the patient fell prior to coming into the hospital, upon arrival the trauma imaging reviewed-no acute pathology  Patient had another fall on the night of 3/4/2024 here in the hospital  All trauma imaging  has been normal  Status post the PT and OT evaluation-they have deemed that the patient is a moderate resource intensity level 2 patient, patient would benefit from rehab  Case management is working on potential rehab options      Essential hypertension  Assessment & Plan  Blood pressure stable  Continue lisinopril    Hypothyroidism  Assessment & Plan  Continue home dosing of Synthroid    Seizure disorder (HCC)  Assessment & Plan  Continue Keppra, continue carbamazepine  Appreciate neurology input  EEG pending  No changes to antiepileptic drug therapy as of right now    Hyperlipidemia  Assessment & Plan  Statin on hold-patient is normally on Crestor, patient reports an allergy to what we have available in the house which is Lipitor  Okay to resume Crestor at time of discharge    Leukemoid reaction  Assessment & Plan  See the management as outlined above    Abnormal CT scan, chest  Assessment & Plan  ll-defined part solid 5.3 x 2.1 cm opacity in the posterior basal left lower lobe, decreased in density medially since 12/29/2023 but increased in size since May 2023. Recommend follow-up with a chest CT with no contrast in 6 months to exclude a slowly growing adenocarcinoma spectrum lesion. A few 9 mm and smaller right lung nodules. These can also be reevaluated in 6 months. Mild right lower lobe fibrosis.   Appreciate pulmonary input  No further inpatient testing, treatment, and/or workup is needed, patient to repeat a CAT scan of the chest in 6 months as outlined above               VTE Pharmacologic Prophylaxis: VTE Score: 5 High Risk (Score >/= 5) - Pharmacological DVT Prophylaxis Ordered: enoxaparin (Lovenox). Sequential Compression Devices Ordered.    Mobility:   Basic Mobility Inpatient Raw Score: 13  JH-HLM Goal: 4: Move to chair/commode  JH-HLM Achieved: 4: Move to chair/commode  HLM Goal achieved. Continue to encourage appropriate mobility.    Patient Centered Rounds: I performed bedside rounds with nursing  staff today.   Discussions with Specialists or Other Care Team Provider: Pulmonary, infectious disease    Education and Discussions with Family / Patient: Updated  (wife) via phone.    Total Time Spent on Date of Encounter in care of patient: 45 mins. This time was spent on one or more of the following: performing physical exam; counseling and coordination of care; obtaining or reviewing history; documenting in the medical record; reviewing/ordering tests, medications or procedures; communicating with other healthcare professionals and discussing with patient's family/caregivers.    Current Length of Stay: 2 day(s)  Current Patient Status: Inpatient   Certification Statement: The patient will continue to require additional inpatient hospital stay due to the need for IV antibiotic  Discharge Plan: Anticipate discharge in 24-48 hrs to patient and wife to decide whether they want to go  home or to a skilled nursing facility    Code Status: Level 3 - DNAR and DNI    Subjective:   Patient seen, looks and feels a lot better, he is remarkably doing very well.  He is already asking about when he can go home.  There is no further evidence of any type of confusion.    Objective:     Vitals:   Temp (24hrs), Av.1 °F (37.3 °C), Min:97.9 °F (36.6 °C), Max:100.3 °F (37.9 °C)    Temp:  [97.9 °F (36.6 °C)-100.3 °F (37.9 °C)] 100.3 °F (37.9 °C)  HR:  [73-93] 85  Resp:  [6-27] 16  BP: (100-152)/(54-72) 142/67  SpO2:  [90 %-100 %] 93 %  Body mass index is 28.53 kg/m².     Input and Output Summary (last 24 hours):     Intake/Output Summary (Last 24 hours) at 3/7/2024 0757  Last data filed at 3/7/2024 0600  Gross per 24 hour   Intake 3190 ml   Output 1465 ml   Net 1725 ml       Physical Exam:   Physical Exam  Vitals and nursing note reviewed.   Constitutional:       General: He is not in acute distress.     Appearance: Normal appearance. He is not ill-appearing.   HENT:      Head: Normocephalic and atraumatic.       Nose: Nose normal.   Eyes:      Extraocular Movements: Extraocular movements intact.      Pupils: Pupils are equal, round, and reactive to light.   Cardiovascular:      Rate and Rhythm: Normal rate and regular rhythm.      Pulses: Normal pulses.      Heart sounds: Normal heart sounds. No murmur heard.     No friction rub. No gallop.   Pulmonary:      Effort: Pulmonary effort is normal.      Comments: Decreased breath sounds bilaterally at the bases, remainder of the lung fields are clear  Abdominal:      General: There is no distension.      Palpations: Abdomen is soft. There is no mass.      Tenderness: There is no abdominal tenderness. There is no guarding or rebound.   Musculoskeletal:         General: No swelling or tenderness. Normal range of motion.      Cervical back: Normal range of motion and neck supple. No rigidity. No muscular tenderness.      Right lower leg: No edema.      Left lower leg: No edema.   Skin:     General: Skin is warm.      Capillary Refill: Capillary refill takes less than 2 seconds.      Findings: No erythema or rash.   Neurological:      General: No focal deficit present.      Mental Status: He is alert and oriented to person, place, and time. Mental status is at baseline.   Psychiatric:         Mood and Affect: Mood normal.         Behavior: Behavior normal.          Additional Data:     Labs:  Results from last 7 days   Lab Units 03/07/24  0610   WBC Thousand/uL 11.81*   HEMOGLOBIN g/dL 10.8*   HEMATOCRIT % 33.7*   PLATELETS Thousands/uL 209   NEUTROS PCT % 81*   LYMPHS PCT % 7*   MONOS PCT % 9   EOS PCT % 3     Results from last 7 days   Lab Units 03/07/24  0610   SODIUM mmol/L 140   POTASSIUM mmol/L 3.7   CHLORIDE mmol/L 105   CO2 mmol/L 26   BUN mg/dL 21   CREATININE mg/dL 1.01   ANION GAP mmol/L 9   CALCIUM mg/dL 8.4   ALBUMIN g/dL 3.1*   TOTAL BILIRUBIN mg/dL 0.37   ALK PHOS U/L 56   ALT U/L 9   AST U/L 20   GLUCOSE RANDOM mg/dL 113                 Results from last 7 days   Lab  Units 03/07/24  0610 03/06/24  0552 03/05/24  1445 03/05/24  1137   LACTIC ACID mmol/L  --   --  1.6  --    PROCALCITONIN ng/ml 2.08* 2.77*  --  0.30*       Lines/Drains:  Invasive Devices       Peripheral Intravenous Line  Duration             Peripheral IV 03/04/24 Distal;Right;Upper;Ventral (anterior) Arm 2 days    Peripheral IV 03/05/24 Right;Ventral (anterior) Forearm 1 day                          Imaging: Reviewed radiology reports from this admission including: chest xray increased vascular congestion    Recent Cultures (last 7 days):   Results from last 7 days   Lab Units 03/06/24  1019 03/05/24  1137 03/04/24  1400 03/04/24  1316   BLOOD CULTURE   --  No Growth at 24 hrs.  No Growth at 24 hrs. No Growth at 48 hrs. No Growth at 48 hrs.   LEGIONELLA URINARY ANTIGEN  Negative  --   --   --        Last 24 Hours Medication List:   Current Facility-Administered Medications   Medication Dose Route Frequency Provider Last Rate    acetaminophen  650 mg Oral Q6H PRN Denver Ruiz MD      azithromycin  500 mg Intravenous Q24H Denver Ruiz MD Stopped (03/06/24 1025)    carBAMazepine  400 mg Oral TID Denver Ruiz MD      cefTRIAXone  1,000 mg Intravenous Q24H Denver Ruiz MD Stopped (03/06/24 0918)    docusate sodium  100 mg Oral BID Denver Ruiz MD      enoxaparin  40 mg Subcutaneous Daily Denver Ruiz MD      furosemide  40 mg Intravenous Once Denver Ruiz MD      influenza vaccine  0.7 mL Intramuscular Once Denver Ruiz MD      levETIRAcetam  500 mg Oral BID Denver Ruiz MD      levothyroxine  25 mcg Oral Early Morning Denver Ruiz MD      lisinopril  5 mg Oral Daily Denver Ruiz MD      ondansetron  4 mg Intravenous Q6H PRN Denver Ruiz MD          Today, Patient Was Seen By: Denver Ruiz MD    **Please Note: This note may have been constructed using a voice recognition system.**

## 2024-03-07 NOTE — PLAN OF CARE
Problem: Potential for Falls  Goal: Patient will remain free of falls  Description: INTERVENTIONS:  - Educate patient/family on patient safety including physical limitations  - Instruct patient to call for assistance with activity   - Consult OT/PT to assist with strengthening/mobility   - Keep Call bell within reach  - Keep bed low and locked with side rails adjusted as appropriate  - Keep care items and personal belongings within reach  - Initiate and maintain comfort rounds  - Make Fall Risk Sign visible to staff  - Offer Toileting every 2 Hours, in advance of need  - Initiate/Maintain bed alarm  - Obtain necessary fall risk management equipment: non skid socks  - Apply yellow socks and bracelet for high fall risk patients  - Consider moving patient to room near nurses station  Outcome: Progressing     Problem: PAIN - ADULT  Goal: Verbalizes/displays adequate comfort level or baseline comfort level  Description: Interventions:  - Encourage patient to monitor pain and request assistance  - Assess pain using appropriate pain scale  - Administer analgesics based on type and severity of pain and evaluate response  - Implement non-pharmacological measures as appropriate and evaluate response  - Consider cultural and social influences on pain and pain management  - Notify physician/advanced practitioner if interventions unsuccessful or patient reports new pain  Outcome: Progressing     Problem: INFECTION - ADULT  Goal: Absence or prevention of progression during hospitalization  Description: INTERVENTIONS:  - Assess and monitor for signs and symptoms of infection  - Monitor lab/diagnostic results  - Monitor all insertion sites, i.e. indwelling lines, tubes, and drains  - Monitor endotracheal if appropriate and nasal secretions for changes in amount and color  - [place] appropriate cooling/warming therapies per order  - Administer medications as ordered  - Instruct and encourage patient and family to use good hand  hygiene technique  - Identify and instruct in appropriate isolation precautions for identified infection/condition  Outcome: Progressing  Goal: Absence of fever/infection during neutropenic period  Description: INTERVENTIONS:  - Monitor WBC    Outcome: Progressing     Problem: SAFETY ADULT  Goal: Patient will remain free of falls  Description: INTERVENTIONS:  - Educate patient/family on patient safety including physical limitations  - Instruct patient to call for assistance with activity   - Consult OT/PT to assist with strengthening/mobility   - Keep Call bell within reach  - Keep bed low and locked with side rails adjusted as appropriate  - Keep care items and personal belongings within reach  - Initiate and maintain comfort rounds  - Make Fall Risk Sign visible to staff  - Offer Toileting every 2 Hours, in advance of need  - Initiate/Maintain bed alarm  - Obtain necessary fall risk management equipment: non skid socks  - Apply yellow socks and bracelet for high fall risk patients  - Consider moving patient to room near nurses station  Outcome: Progressing  Goal: Maintain or return to baseline ADL function  Description: INTERVENTIONS:  -  Assess patient's ability to carry out ADLs; assess patient's baseline for ADL function and identify physical deficits which impact ability to perform ADLs (bathing, care of mouth/teeth, toileting, grooming, dressing, etc.)  - Assess/evaluate cause of self-care deficits   - Assess range of motion  - Assess patient's mobility; develop plan if impaired  - Assess patient's need for assistive devices and provide as appropriate  - Encourage maximum independence but intervene and supervise when necessary  - Involve family in performance of ADLs  - Assess for home care needs following discharge   - Consider OT consult to assist with ADL evaluation and planning for discharge  - Provide patient education as appropriate  Outcome: Progressing  Goal: Maintains/Returns to pre admission  functional level  Description: INTERVENTIONS:  - Perform AM-PAC 6 Click Basic Mobility/ Daily Activity assessment daily.  - Set and communicate daily mobility goal to care team and patient/family/caregiver.   - Collaborate with rehabilitation services on mobility goals if consulted  - Perform Range of Motion 2 times a day.  - Reposition patient every 2 hours.  - Dangle patient 2 times a day  - Stand patient 2 times a day  - Ambulate patient 2 times a day  - Out of bed to chair 3 times a day   - Out of bed for meals 3 times a day  - Out of bed for toileting  - Record patient progress and toleration of activity level   Outcome: Progressing     Problem: DISCHARGE PLANNING  Goal: Discharge to home or other facility with appropriate resources  Description: INTERVENTIONS:  - Identify barriers to discharge w/patient and caregiver  - Arrange for needed discharge resources and transportation as appropriate  - Identify discharge learning needs (meds, wound care, etc.)  - Refer to Case Management Department for coordinating discharge planning if the patient needs post-hospital services based on physician/advanced practitioner order or complex needs related to functional status, cognitive ability, or social support system  Outcome: Progressing     Problem: RESPIRATORY - ADULT  Goal: Achieves optimal ventilation and oxygenation  Description: INTERVENTIONS:  - Assess for changes in respiratory status  - Assess for changes in mentation and behavior  - Position to facilitate oxygenation and minimize respiratory effort  - Oxygen administered by appropriate delivery if ordered  - Initiate smoking cessation education as indicated  - Encourage broncho-pulmonary hygiene including cough, deep breathe, Incentive Spirometry  - Assess the need for suctioning and aspirate as needed  - Assess and instruct to report SOB or any respiratory difficulty  - Respiratory Therapy support as indicated  Outcome: Progressing     Problem: Knowledge  Deficit  Goal: Patient/family/caregiver demonstrates understanding of disease process, treatment plan, medications, and discharge instructions  Description: Complete learning assessment and assess knowledge base.  Interventions:  - Provide teaching at level of understanding  - Provide teaching via preferred learning methods  Outcome: Progressing

## 2024-03-07 NOTE — PLAN OF CARE
Problem: Potential for Falls  Goal: Patient will remain free of falls  Description: INTERVENTIONS:  - Educate patient/family on patient safety including physical limitations  - Instruct patient to call for assistance with activity   - Consult OT/PT to assist with strengthening/mobility   - Keep Call bell within reach  - Keep bed low and locked with side rails adjusted as appropriate  - Keep care items and personal belongings within reach  - Initiate and maintain comfort rounds  - Make Fall Risk Sign visible to staff  - Offer Toileting every 2 Hours, in advance of need  - Initiate/Maintain bed alarm  - Obtain necessary fall risk management equipment: non skid socks  - Apply yellow socks and bracelet for high fall risk patients  - Consider moving patient to room near nurses station  Outcome: Progressing     Problem: PAIN - ADULT  Goal: Verbalizes/displays adequate comfort level or baseline comfort level  Description: Interventions:  - Encourage patient to monitor pain and request assistance  - Assess pain using appropriate pain scale  - Administer analgesics based on type and severity of pain and evaluate response  - Implement non-pharmacological measures as appropriate and evaluate response  - Consider cultural and social influences on pain and pain management  - Notify physician/advanced practitioner if interventions unsuccessful or patient reports new pain  Outcome: Progressing     Problem: INFECTION - ADULT  Goal: Absence or prevention of progression during hospitalization  Description: INTERVENTIONS:  - Assess and monitor for signs and symptoms of infection  - Monitor lab/diagnostic results  - Monitor all insertion sites, i.e. indwelling lines, tubes, and drains  - Monitor endotracheal if appropriate and nasal secretions for changes in amount and color  - [place] appropriate cooling/warming therapies per order  - Administer medications as ordered  - Instruct and encourage patient and family to use good hand  hygiene technique  - Identify and instruct in appropriate isolation precautions for identified infection/condition  Outcome: Progressing

## 2024-03-07 NOTE — ASSESSMENT & PLAN NOTE
Patient was noted to have an O2 sat of 83% at time of arrival into the emergency room  Significantly improved -patient is now down to 1 L of supplemental oxygen  Differential includes-  1. A right lower fibrosis as noted on CT chest PE study, PE was ruled out,  2.  Chronic left hemidiaphragm elevation   3.  Sepsis-secondary to unspecified exact etiology, perhaps COVID-19 versus the possibility of a left lower lobe pneumonia  4.  Abnormal CT chest-see below -possible adenocarcinoma  Appreciate pulmonary input  Continue to wean oxygen as able

## 2024-03-07 NOTE — ASSESSMENT & PLAN NOTE
Sepsis parameters have significantly improved  Acute toxic metabolic encephalopathy has resolved-this was secondary to sepsis  Infectious workup thus far:-  #1.  Blood cultures x 2 sets from 3/4/2024-no growth at 48 hours  #2.  Blood cultures x 2 sets from 3/5/2024-no growth at 24 hours  #3.  Testing for RSV, and influenza A/B is negative  #4.  Urinalysis does not suggest UTI  #5.  COVID-19 testing is positive-however the patient tested positive back in December of this year.  No treatment needed for COVID-19  #6.  Repeat chest x-ray revealed some pulmonary vascular congestion-will treat with a one-time dose of IV Lasix  #7.  Procalcitonin testing 0.30, 2.77, 2.08  #8.  Appreciate ID input  #9.  Continue ceftriaxone day 3, Zithromax day 2 for empiric treatment of a suspected left lower lobe pneumonia

## 2024-03-07 NOTE — CASE MANAGEMENT
Case Management Discharge Planning Note    Patient name Jose Roberto Herring  Location ICU 03/ICU 03- MRN [id number]  : 1950 Date 3/7/2024       Current Admission Date: 3/4/2024  Current Admission Diagnosis:Acute respiratory failure with hypoxia (HCC)   Patient Active Problem List    Diagnosis Date Noted    Sepsis without acute organ dysfunction (HCC) 2024    Acute respiratory failure with hypoxia (HCC) 2024    Fall 2024    Leukemoid reaction 2024    Abnormal CT scan, chest 2024    Malignant neoplasm of ureteric orifice (HCC) 2024    Hypomagnesemia 2023    COVID-19 2023    Stomach ulcer 2023    Seizure disorder (HCC) 2023    Essential hypertension 2023    Hypothyroidism 2023    Hyperlipidemia 2023    Possible upper GI bleed 2023    Anemia 2023    LULA (acute kidney injury) (HCC) 2023    History of bladder cancer 2018    Malignant neoplasm of overlapping sites of bladder (HCC) 2018    History of renal cell cancer 2018      LOS (days): 2  Geometric Mean LOS (GMLOS) (days): 5  Days to GMLOS:3     OBJECTIVE:  Risk of Unplanned Readmission Score: 20.06     Current admission status: Inpatient   Preferred Pharmacy:   [id number] - [place], [place] - [address]  [address] [address]  Phone: [phone] Fax: [phone]    [facility] - [place], [place] - [address]  [address] [address]  Phone: [phone] Fax: [phone]    [id number] - [place], [place] - [address]  [address] [address]  Phone: [phone] Fax: [phone]    Primary Care Provider: Nolan Manning MD    Primary Insurance: MEDICARE  Secondary Insurance: [facility]    DISCHARGE DETAILS:  Requested Home Health Care         Is the patient interested in HHC at discharge?: Yes  Home Health Discipline requested:: Nursing,  Occupational Therapy, Physical Therapy  Home Health Agency Name:: [facility]  HHA External Referral Reason (only applicable if external HHA name selected): Patient has established relationship with provider  Home Health Follow-Up Provider:: PCP  Home Health Services Needed:: Gait/ADL Training, Evaluate Functional Status and Safety, Strengthening/Theraputic Exercises to Improve Function  Homebound Criteria Met:: Requires the Assistance of Another Person for Safe Ambulation or to Leave the Home, Uses an Assist Device (i.e. cane, walker, etc), Communicable Disease  Supporting Clincal Findings:: Limited Endurance, Dyspnea with Exertion    DME Referral Provided  Referral made for DME?: No    Would you like to participate in our [facility] service program?  : No - Declined    Treatment Team Recommendation: Short Term Rehab  Discharge Destination Plan:: Home with Home Health Care  Transport at Discharge : Family   Pt and wife do not want pt to go for STR.  Pt states he would rather have [facility] services.  A referral was made via [facility].  Pt can choose from accepting agencies.

## 2024-03-07 NOTE — PROGRESS NOTES
"Progress Note - Pulmonary   Jose Roberto Herring 73 y.o. male MRN: [id number]  Unit/Bed#: ICU 03-01 Encounter: [id number]      Assessment:  Systemic inflammatory response without evidence of infection.  Cultures remain negative.  Abnormal CT scan with persistent left basal focal opacity.  Possible adenocarcinoma spectrum.  History of tobacco use.  Chronic elevated left hemidiaphragm.  Recent COVID-19 infection.    Plan:  CT scan of the chest in 6 months.  Encourage incentive spirometry.  Increase activity as tolerated.    Subjective:   The patient was sitting in the chair.  He is fully alert.  He slept well last night.  Denies pain.  No shortness of breath.  No cough.    Vitals: Blood pressure 121/56, pulse 91, temperature 99.2 °F (37.3 °C), temperature source Tympanic, resp. rate (!) 27, height 6' 2\" (1.88 m), weight 101 kg (222 lb 3.6 oz), SpO2 95%., Body mass index is 28.53 kg/m².      Intake/Output Summary (Last 24 hours) at 3/7/2024 1036  Last data filed at 3/7/2024 1019  Gross per 24 hour   Intake 3557.5 ml   Output 2450 ml   Net 1107.5 ml       Physical Exam  Gen: Awake, alert, oriented x 3, no acute distress  HEENT: Mucous membranes moist, no oral lesions, no thrush  NECK: No accessory muscle use, JVP not elevated  Cardiac: Regular, single S1, single S2, no murmurs, no rubs, no gallops  Lungs: Decreased breath sounds on the left base.  Abdomen: normoactive bowel sounds, soft nontender, nondistended, no rebound or rigidity, no guarding  Extremities: no cyanosis, no clubbing, no edema    Labs: CBC:   Lab Results   Component Value Date    WBC 11.81 (H) 03/07/2024    HGB 10.8 (L) 03/07/2024    HCT 33.7 (L) 03/07/2024    MCV 99 (H) 03/07/2024     03/07/2024    RBC 3.39 (L) 03/07/2024    MCH 31.9 03/07/2024    MCHC 32.0 03/07/2024    RDW 12.7 03/07/2024    MPV 10.8 03/07/2024    NRBC 0 03/07/2024   , CMP:   Lab Results   Component Value Date    SODIUM 140 03/07/2024    K 3.7 03/07/2024     03/07/2024    " CO2 26 03/07/2024    BUN 21 03/07/2024    CREATININE 1.01 03/07/2024    CALCIUM 8.4 03/07/2024    AST 20 03/07/2024    ALT 9 03/07/2024    ALKPHOS 56 03/07/2024    EGFR 73 03/07/2024           Cristobal Auguste MD

## 2024-03-07 NOTE — CASE MANAGEMENT
Case Management Discharge Planning Note    Patient name Jose Roberto Herring  Location ICU 03/ICU - MRN [id number]  : 1950 Date 3/7/2024       Current Admission Date: 3/4/2024  Current Admission Diagnosis:Acute respiratory failure with hypoxia (HCC)   Patient Active Problem List    Diagnosis Date Noted    Sepsis without acute organ dysfunction (HCC) 2024    Acute respiratory failure with hypoxia (HCC) 2024    Fall 2024    Leukemoid reaction 2024    Abnormal CT scan, chest 2024    Malignant neoplasm of ureteric orifice (HCC) 2024    Hypomagnesemia 2023    COVID-19 2023    Stomach ulcer 2023    Seizure disorder (HCC) 2023    Essential hypertension 2023    Hypothyroidism 2023    Hyperlipidemia 2023    Possible upper GI bleed 2023    Anemia 2023    LULA (acute kidney injury) (HCC) 2023    History of bladder cancer 2018    Malignant neoplasm of overlapping sites of bladder (HCC) 2018    History of renal cell cancer 2018      LOS (days): 2  Geometric Mean LOS (GMLOS) (days): 5  Days to GMLOS:2.8     OBJECTIVE:  Risk of Unplanned Readmission Score: 19.91      Current admission status: Inpatient   Preferred Pharmacy:   [id number] [place], [place] - [address] [address]  [address] [facility] [address]  Phone: [phone] Fax: [phone]    [facility] [place], [place] - [address]  [address] [facility] [address]  Phone: [phone] Fax: [phone]    [id number] - [place], [place] - [address] [facility] [address] [facility]  [address] [facility] [address]  Phone: [phone] Fax: [phone]    Primary Care Provider: Nolan Manning MD    Primary Insurance: MEDICARE  Secondary Insurance: [employer] [facility] [place]    DISCHARGE DETAILS:  TC to pt wife to DC possible DC tomorrow.  Pt has been accepted by Janessa HANSON and [facility].  Wife is awaiting a determination from   [facility] to decide.  Will f/u in the am as ARU will make a determination by tomorrow.  Will f/u with ARU and wife tomorrow.

## 2024-03-07 NOTE — CASE MANAGEMENT
Case Management Discharge Planning Note    Patient name Jose Roberto Herring  Location ICU 03/ICU - MRN [id number]  : 1950 Date 3/7/2024       Current Admission Date: 3/4/2024  Current Admission Diagnosis:Acute respiratory failure with hypoxia (HCC)   Patient Active Problem List    Diagnosis Date Noted    Sepsis without acute organ dysfunction (HCC) 2024    Acute respiratory failure with hypoxia (HCC) 2024    Fall 2024    Leukemoid reaction 2024    Abnormal CT scan, chest 2024    Malignant neoplasm of ureteric orifice (HCC) 2024    Hypomagnesemia 2023    COVID-19 2023    Stomach ulcer 2023    Seizure disorder (HCC) 2023    Essential hypertension 2023    Hypothyroidism 2023    Hyperlipidemia 2023    Possible upper GI bleed 2023    Anemia 2023    LULA (acute kidney injury) (HCC) 2023    History of bladder cancer 2018    Malignant neoplasm of overlapping sites of bladder (HCC) 2018    History of renal cell cancer 2018      LOS (days): 2  Geometric Mean LOS (GMLOS) (days): 5  Days to GMLOS:2.8     OBJECTIVE:  Risk of Unplanned Readmission Score: 19.91      Current admission status: Inpatient   Preferred Pharmacy:   [id number] [address]  [address] [facility] [address]  Phone: [phone] Fax: [phone]    [facility] [address]  [address] [facility] [address]  Phone: [phone] Fax: [phone]    [id number] - [place], [place] - [address] [facility]  [address] [facility] [address]  Phone: [phone] Fax: [phone]    Primary Care Provider: Nolan Manning MD    Primary Insurance: MEDICARE  Secondary Insurance: [facility]    DISCHARGE DETAILS:  Provided the choice list from [facility] to the wife who was in the pt room.  They will chose from the list.  Anticipate DC tomorrow as per  GRISEL.

## 2024-03-07 NOTE — ASSESSMENT & PLAN NOTE
Continue Keppra, continue carbamazepine  Appreciate neurology input  EEG pending  No changes to antiepileptic drug therapy as of right now

## 2024-03-07 NOTE — PROGRESS NOTES
Progress Note - Infectious Disease   Jose Roberto Herring 73 y.o. male MRN: [id number]  Unit/Bed#: ICU 03-01 Encounter: [id number]      Assessment:  SIRS/Sepsis, evolving early. Fever, leukocytosis, tachycardia, tachypnea.  Negative UA, no acute infectious findings on CT chest/abdomen/pelvis, serial blood cultures, Legionella/pneumococcal antigen.  Acute hypoxic respiratory failure.  Improving.  Abnormal Chest CT, with persistent LLL consolidation. Decreasing on most recent imaging. Consider malignancy.  Negative Legionella/pneumococcus antigen.  Ambulatory dysfunction. S/p mechanical fall at home.  COVID PCR positive. Previously tested positive 12/23. Asymptomatic from this standpoint. Low suspicion for active COVID19.  Acute encephalopathy. Likely toxic metabolic in the setting of high fevers. No meningismus.  Much improved.  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 S/p cystoscopy 2/12/24. Negative UA. CT with mild enhancement of walls of distal right ureter.     Plan:  Low clinical suspicion for pneumonia based on absence of respiratory symptoms or evidence of pneumonia on CT.  Also low clinical suspicion for active COVID-19 infection. Consider evolving occult bacteremia however all blood cultures remain negative thus far.  Fortunately, fever curve and WBC count is much improved today.  Will hold azithromycin as Legionella antigen is negative.  Continue ceftriaxone pending repeat blood cultures to ensure they remain negative.  If patient continues to improve and infectious workup remains negative, anticipate discontinuation of antibiotic in the next 24 hours.    I discussed above plan with Dr. Ruiz from primary service who agrees with continuation of ceftriaxone.    Antibiotic  Ceftriaxone 2  Azithromycin 1          Subjective:  Patient is feeling much better today.  No shortness of breath or cough.  No further high fevers.  Confusion is much improved.    Objective:  Vitals:  Temp:  [97.9 °F (36.6  °C)-100.3 °F (37.9 °C)] 99.2 °F (37.3 °C)  HR:  [73-92] 91  Resp:  [6-27] 27  BP: (100-152)/(55-72) 121/56  SpO2:  [90 %-100 %] 95 %  Temp (24hrs), Av °F (37.2 °C), Min:97.9 °F (36.6 °C), Max:100.3 °F (37.9 °C)  Current: Temperature: 99.2 °F (37.3 °C)    Physical Exam:   General:  No acute distress, nontoxic, elderly  ENT atraumatic normocephalic  Neck trachea midline  Psychiatric:  Awake and alert  Pulmonary:  Normal respiratory excursion without accessory muscle use  Abdomen: Nondistended with no rigidity or guarding  Extremities:  No edema  Skin:  No rashes or visible draining wounds  Neuro moves all extremities spontaneously    Lab Results:  I have personally reviewed pertinent labs.  Results from last 7 days   Lab Units 24  0610 24  0552 24  0419   POTASSIUM mmol/L 3.7 4.2 3.9   CHLORIDE mmol/L 105 106 102   CO2 mmol/L 26 25 23   BUN mg/dL 21 22 19   CREATININE mg/dL 1.01 1.23 1.31*   EGFR ml/min/1.73sq m 73 57 53   CALCIUM mg/dL 8.4 7.9* 8.5   AST U/L 20 15 7*   ALT U/L 9 6* 6*   ALK PHOS U/L 56 58 74     Results from last 7 days   Lab Units 24  0610 24  0552 24  0419   WBC Thousand/uL 11.81* 16.38* 15.83*   HEMOGLOBIN g/dL 10.8* 10.7* 11.7*   PLATELETS Thousands/uL 209 194 223     Results from last 7 days   Lab Units 24  1019 24  1137 24  1400 24  1316   BLOOD CULTURE   --  No Growth at 24 hrs.  No Growth at 24 hrs. No Growth at 48 hrs. No Growth at 48 hrs.   LEGIONELLA URINARY ANTIGEN  Negative  --   --   --        Imaging Studies:   I have personally reviewed pertinent imaging study reports and images in PACS.    Recent chest x-ray, personally reviewed, shows elevation of left hemidiaphragm with left lower lobe linear atelectasis.  Mild pulmonary vascular congestion.    EKG, Pathology, and Other Studies:   I have personally reviewed pertinent reports.

## 2024-03-07 NOTE — PROGRESS NOTES
Patient:    MRN:  [id number]    Adilsonin Request ID:  [id number]    Level of care reserved:  Home Health Agency    Partner Reserved:  [facility] [place], [place] [id number] [phone]    Clinical needs requested:    Geography searched:  [id number]    Start of Service:    Request sent:  9:33am EST on 3/7/2024 by Suly Mackey    Partner reserved:  3:27pm EST on 3/7/2024 by Suly Mackey    Choice list shared:  2:12pm EST on 3/7/2024 by Suly Mackey

## 2024-03-08 VITALS
DIASTOLIC BLOOD PRESSURE: 66 MMHG | SYSTOLIC BLOOD PRESSURE: 139 MMHG | TEMPERATURE: 97.9 F | HEART RATE: 71 BPM | OXYGEN SATURATION: 92 % | BODY MASS INDEX: 28.72 KG/M2 | WEIGHT: 223.77 LBS | RESPIRATION RATE: 16 BRPM | HEIGHT: 74 IN

## 2024-03-08 LAB
ANION GAP SERPL CALCULATED.3IONS-SCNC: 10 MMOL/L
BASOPHILS # BLD AUTO: 0.04 THOUSANDS/ÂΜL (ref 0–0.1)
BASOPHILS NFR BLD AUTO: 1 % (ref 0–1)
BUN SERPL-MCNC: 22 MG/DL (ref 5–25)
CALCIUM SERPL-MCNC: 8.2 MG/DL (ref 8.4–10.2)
CHLORIDE SERPL-SCNC: 105 MMOL/L (ref 96–108)
CO2 SERPL-SCNC: 25 MMOL/L (ref 21–32)
CREAT SERPL-MCNC: 0.89 MG/DL (ref 0.6–1.3)
EOSINOPHIL # BLD AUTO: 0.28 THOUSAND/ÂΜL (ref 0–0.61)
EOSINOPHIL NFR BLD AUTO: 3 % (ref 0–6)
ERYTHROCYTE [DISTWIDTH] IN BLOOD BY AUTOMATED COUNT: 12.7 % (ref 11.6–15.1)
GFR SERPL CREATININE-BSD FRML MDRD: 84 ML/MIN/1.73SQ M
GLUCOSE SERPL-MCNC: 117 MG/DL (ref 65–140)
HCT VFR BLD AUTO: 33.3 % (ref 36.5–49.3)
HGB BLD-MCNC: 10.8 G/DL (ref 12–17)
IMM GRANULOCYTES # BLD AUTO: 0.02 THOUSAND/UL (ref 0–0.2)
IMM GRANULOCYTES NFR BLD AUTO: 0 % (ref 0–2)
LYMPHOCYTES # BLD AUTO: 1.2 THOUSANDS/ÂΜL (ref 0.6–4.47)
LYMPHOCYTES NFR BLD AUTO: 15 % (ref 14–44)
MCH RBC QN AUTO: 31.8 PG (ref 26.8–34.3)
MCHC RBC AUTO-ENTMCNC: 32.4 G/DL (ref 31.4–37.4)
MCV RBC AUTO: 98 FL (ref 82–98)
MONOCYTES # BLD AUTO: 0.65 THOUSAND/ÂΜL (ref 0.17–1.22)
MONOCYTES NFR BLD AUTO: 8 % (ref 4–12)
NEUTROPHILS # BLD AUTO: 6.04 THOUSANDS/ÂΜL (ref 1.85–7.62)
NEUTS SEG NFR BLD AUTO: 73 % (ref 43–75)
NRBC BLD AUTO-RTO: 0 /100 WBCS
PLATELET # BLD AUTO: 210 THOUSANDS/UL (ref 149–390)
PMV BLD AUTO: 10.7 FL (ref 8.9–12.7)
POTASSIUM SERPL-SCNC: 3.3 MMOL/L (ref 3.5–5.3)
RBC # BLD AUTO: 3.4 MILLION/UL (ref 3.88–5.62)
SODIUM SERPL-SCNC: 140 MMOL/L (ref 135–147)
WBC # BLD AUTO: 8.23 THOUSAND/UL (ref 4.31–10.16)

## 2024-03-08 PROCEDURE — 94761 N-INVAS EAR/PLS OXIMETRY MLT: CPT

## 2024-03-08 PROCEDURE — 99232 SBSQ HOSP IP/OBS MODERATE 35: CPT | Performed by: INTERNAL MEDICINE

## 2024-03-08 PROCEDURE — 99239 HOSP IP/OBS DSCHRG MGMT >30: CPT | Performed by: HOSPITALIST

## 2024-03-08 PROCEDURE — 85025 COMPLETE CBC W/AUTO DIFF WBC: CPT | Performed by: HOSPITALIST

## 2024-03-08 PROCEDURE — 80048 BASIC METABOLIC PNL TOTAL CA: CPT | Performed by: HOSPITALIST

## 2024-03-08 RX ORDER — CEFDINIR 300 MG/1
300 CAPSULE ORAL EVERY 12 HOURS SCHEDULED
Qty: 8 CAPSULE | Refills: 0 | Status: SHIPPED | OUTPATIENT
Start: 2024-03-08 | End: 2024-03-12

## 2024-03-08 RX ORDER — CEFDINIR 300 MG/1
300 CAPSULE ORAL EVERY 12 HOURS SCHEDULED
Status: DISCONTINUED | OUTPATIENT
Start: 2024-03-08 | End: 2024-03-08 | Stop reason: HOSPADM

## 2024-03-08 RX ORDER — POTASSIUM CHLORIDE 20 MEQ/1
40 TABLET, EXTENDED RELEASE ORAL ONCE
Status: COMPLETED | OUTPATIENT
Start: 2024-03-08 | End: 2024-03-08

## 2024-03-08 RX ADMIN — CEFDINIR 300 MG: 300 CAPSULE ORAL at 08:53

## 2024-03-08 RX ADMIN — POTASSIUM CHLORIDE 40 MEQ: 1500 TABLET, EXTENDED RELEASE ORAL at 08:52

## 2024-03-08 RX ADMIN — CARBAMAZEPINE 400 MG: 200 TABLET, EXTENDED RELEASE ORAL at 08:53

## 2024-03-08 RX ADMIN — LEVETIRACETAM 500 MG: 500 TABLET, FILM COATED ORAL at 08:52

## 2024-03-08 RX ADMIN — LISINOPRIL 5 MG: 5 TABLET ORAL at 08:53

## 2024-03-08 RX ADMIN — DOCUSATE SODIUM 100 MG: 100 CAPSULE, LIQUID FILLED ORAL at 08:52

## 2024-03-08 RX ADMIN — LEVOTHYROXINE SODIUM 25 MCG: 25 TABLET ORAL at 05:05

## 2024-03-08 NOTE — PROGRESS NOTES
Progress Note - [facility] Infectious Disease   Jose Roberto Herring 73 y.o. male MRN: [id number]  Unit/Bed#: ICU 03-01 Encounter: [id number]      IMPRESSION & RECOMMENDATIONS:   SIRS/Sepsis, evolving early in admission. Fever, leukocytosis, tachycardia, tachypnea.  Negative UA, no acute infectious findings on CT chest/abdomen/pelvis, serial blood cultures, Legionella/pneumococcal antigen. Now remains afebrile and hemodynamically stable.   -discontinue antibiotics and monitor off   -continue to follow up blood cultures   -monitor temperature and hemodynamics     Acute hypoxic respiratory failure.  Improving and now on room air.   -outpatient pulmonology follow up     Abnormal Chest CT, with persistent LLL consolidation. Decreasing on most recent imaging. Consider malignancy.  Negative Legionella/pneumococcus antigen.  -pulmonology follow up outpatient     Ambulatory dysfunction. S/p mechanical fall at home.  -PT/OT per primary team     COVID PCR positive. Previously tested positive 12/23. Asymptomatic from this standpoint. Low suspicion for active COVID19.  -monitor O2 sats as anove     Acute encephalopathy. Likely toxic metabolic in the setting of high fevers. No meningismus. Also concern for possible breakthrough seizure. Much improved.  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 S/p cystoscopy 2/12/24. Negative UA. CT with mild enhancement of walls of distal right ureter.  -ongoing urology follow up     Antibiotics:  D4 IV abx     I have discussed the above management plan in detail with patient.     Above plan discussed in detail with Dr. Ruiz who is in agreement with plan to d/c IV abx.     24 Hour events:  Yesterday and overnight notes reviewed. Patient titrated off O2. Afebrile.     Subjective:  Patient has no fever, chills, sweats; no nausea, vomiting, diarrhea; no cough, shortness of breath; no pain. No new symptoms. Wants to go home. Refusing rehab.      Objective:  Vitals:  Temp:  [97.9 °F (36.6 °C)-99.2 °F (37.3 °C)] 97.9 °F (36.6 °C)  HR:  [78-91] 80  Resp:  [14-27] 16  BP: ()/(52-69) 139/66  SpO2:  [89 %-95 %] 89 %  Temp (24hrs), Av.5 °F (36.9 °C), Min:97.9 °F (36.6 °C), Max:99.2 °F (37.3 °C)  Current: Temperature: 97.9 °F (36.6 °C)    PHYSICAL EXAM:  General Appearance:  Appearing well though disheveled, otherwise nontoxic, and in no distress   HEENT: Normocephalic, without obvious abnormality, atraumatic. Conjunctiva pink and sclera anicteric. Oropharynx moist without lesions.     Lungs:   Clear to auscultation bilaterally, respirations unlabored satting 94% on RA   Heart:  RRR; no murmur, rub or gallop   Abdomen:   Soft, non-tender, non-distended, positive bowel sounds    Extremities: No cyanosis, clubbing or edema   Musculoskeletal: Back symmetric without curvature, ROM normal.    Skin: No rashes or lesions. No draining wounds noted. Peripheral IV intact without evidence of erythema, warmth, or exudate.        LABS, IMAGING, & OTHER STUDIES:  Extensive review of the medical records in epic including review of the notes, radiographs, and laboratory results were reviewed personally as below.     Lab Results:  Results from last 7 days   Lab Units 24  0504 24  0610 24  0552   WBC Thousand/uL 8.23 11.81* 16.38*   HEMOGLOBIN g/dL 10.8* 10.8* 10.7*   PLATELETS Thousands/uL 210 209 194     Results from last 7 days   Lab Units 24  0504 24  0610 24  0552 24  0419   SODIUM mmol/L 140 140 140 136   POTASSIUM mmol/L 3.3* 3.7 4.2 3.9   CHLORIDE mmol/L 105 105 106 102   CO2 mmol/L 25 26 25 23   BUN mg/dL 22 21 22 19   CREATININE mg/dL 0.89 1.01 1.23 1.31*   EGFR ml/min/1.73sq m 84 73 57 53   CALCIUM mg/dL 8.2* 8.4 7.9* 8.5   AST U/L  --  20 15 7*   ALT U/L  --  9 6* 6*   ALK PHOS U/L  --  56 58 74     Results from last 7 days   Lab Units 24  1137 24  1019 24  1137 24  1400 24  1316    BLOOD CULTURE   --   --  No Growth at 48 hrs.  No Growth at 48 hrs. No Growth at 72 hrs. No Growth at 72 hrs.   MRSA CULTURE ONLY  No Methicillin Resistant Staphlyococcus aureus (MRSA) isolated  --   --   --   --    LEGIONELLA URINARY ANTIGEN   --  Negative  --   --   --      Results from last 7 days   Lab Units 03/07/24  0610 03/06/24  0552 03/05/24  1137   PROCALCITONIN ng/ml 2.08* 2.77* 0.30*     Results from last 7 days   Lab Units 03/06/24  0552 03/05/24  1137   CRP mg/L 264.4* 202.4*         Results from last 7 days   Lab Units 03/05/24  2226   D-DIMER QUANTITATIVE ug/ml FEU 1.61*       Lab interpretation/comments: leukocytosis normalized, LULA resolved     Culture data: serial blood cultures are negative     Imaging Studies:   Imaging interpretation/comments: no recent imaging to review

## 2024-03-08 NOTE — CASE MANAGEMENT
Case Management Discharge Planning Note    Patient name Jose Roberto Herring  Location ICU 03/ICU - MRN [id number]  : 1950 Date 3/8/2024       Current Admission Date: 3/4/2024  Current Admission Diagnosis:Acute respiratory failure with hypoxia (HCC)   Patient Active Problem List    Diagnosis Date Noted    Sepsis without acute organ dysfunction (HCC) 2024    Acute respiratory failure with hypoxia (HCC) 2024    Fall 2024    Leukemoid reaction 2024    Abnormal CT scan, chest 2024    Malignant neoplasm of ureteric orifice (HCC) 2024    Hypomagnesemia 2023    COVID-19 2023    Stomach ulcer 2023    Seizure disorder (HCC) 2023    Essential hypertension 2023    Hypothyroidism 2023    Hyperlipidemia 2023    Possible upper GI bleed 2023    Anemia 2023    LULA (acute kidney injury) (HCC) 2023    History of bladder cancer 2018    Malignant neoplasm of overlapping sites of bladder (HCC) 2018    History of renal cell cancer 2018      LOS (days): 3  Geometric Mean LOS (GMLOS) (days): 5  Days to GMLOS:2     OBJECTIVE:  Risk of Unplanned Readmission Score: 21.32         Current admission status: Inpatient   Preferred Pharmacy:   [id number] - [facility] [address] [facility]  [address] [facility] [address]  Phone: [phone] Fax: [phone]    [facility] [address] [facility]  [address] [facility] [address]  Phone: [phone] Fax: [phone]    [id number] - [place], [place] - [address] [facility]  [address] [facility] [address]  Phone: [phone] Fax: [phone]    Primary Care Provider: Nolan Manning MD    Primary Insurance: MEDICARE  Secondary Insurance: [facility]    DISCHARGE DETAILS:  Received a TC from  María Elena from [facility].  RN states the pt is not active with his current PCP Dr Nolan Manning.  Spoke to wife who  was in agreement with finding a [facility] PCP near her home.  TC to [facility].  Got an appointment with Dr Kimani Judd on 4/12/24 at 1440.  TC to wife to provide the new PCP information.  TC to [facility], spoke to María Elena to provide the PCP appointment.

## 2024-03-08 NOTE — DISCHARGE SUMMARY
[facility]  Discharge- Jose Roberto Herring 1950, 73 y.o. male MRN: [id number]  Unit/Bed#: ICU 03-01 Encounter: [id number]  Primary Care Provider: Nolan Manning MD   Date and time admitted to hospital: 3/4/2024  1:06 PM    * Acute respiratory failure with hypoxia (HCC)  Assessment & Plan  Patient was noted to have an O2 sat of 83% at time of arrival into the emergency room  Has resolved-patient will get an exercise desat study prior to discharge  Differential included-  1. A right lower fibrosis as noted on CT chest PE study, PE was ruled out,  2.  Chronic left hemidiaphragm elevation   3.  Sepsis-secondary to unspecified exact etiology, perhaps COVID-19 versus the possibility of a left lower lobe pneumonia  4.  Abnormal CT chest-see below -possible adenocarcinoma  Appreciate pulmonary input  Medically stable for discharge  See the remaining details as outlined below    Sepsis without acute organ dysfunction (HCC)  Assessment & Plan  Sepsis parameters have significantly improved  Acute toxic metabolic encephalopathy has resolved-this was secondary to sepsis  Infectious workup thus far:-  #1.  Blood cultures x 2 sets from 3/4/2024-no growth at 72 hours  #2.  Blood cultures x 2 sets from 3/5/2024-no growth at 48 hours  #3.  Testing for RSV, and influenza A/B is negative  #4.  Urinalysis does not suggest UTI  #5.  COVID-19 testing is positive-however the patient tested positive back in December of this year.  No treatment needed for COVID-19  #6.  Repeat chest x-ray revealed some pulmonary vascular congestion-patient received IV Lasix yesterday x 1 dose  #7.  Procalcitonin testing 0.30, 2.77, 2.08  #8.  Appreciate ID input  #9.  Status post 3 days worth of ceftriaxone and Zithromax, will discharge home on 4 more days worth of cefdinir  Outpatient follow-up with PCP    Fall  Assessment & Plan  Initially the patient fell prior to coming into the hospital, upon arrival the trauma imaging  reviewed-no acute pathology  Patient had another fall on the night of 3/4/2024 here in the hospital  All trauma imaging has been normal  Status post the PT and OT evaluation-they have deemed that the patient is a moderate resource intensity level 2 patient, patient would benefit from rehab  The patient, and his wife declined the possibility of being transitioned to rehab  Case management has set up home health care services  Okay for discharge home today      Essential hypertension  Assessment & Plan  Blood pressure stable  Continue lisinopril post discharge    Hypothyroidism  Assessment & Plan  Continue home dosing of Synthroid post discharge    Seizure disorder (HCC)  Assessment & Plan  Continue Keppra, continue carbamazepine  Appreciate neurology input  Can pursue EEG testing in the outpatient setting  No further inpatient testing, treatment, and/or workup is needed    Hyperlipidemia  Assessment & Plan  DC home on Crestor as the patient was taking prior to arrival    Leukemoid reaction  Assessment & Plan  See the management as outlined above    Abnormal CT scan, chest  Assessment & Plan  ll-defined part solid 5.3 x 2.1 cm opacity in the posterior basal left lower lobe, decreased in density medially since 12/29/2023 but increased in size since May 2023. Recommend follow-up with a chest CT with no contrast in 6 months to exclude a slowly growing adenocarcinoma spectrum lesion. A few 9 mm and smaller right lung nodules. These can also be reevaluated in 6 months. Mild right lower lobe fibrosis.   Appreciate pulmonary input  No further inpatient testing, treatment, and/or workup is needed, patient to repeat a CAT scan of the chest in 6 months as outlined above  The patient and his wife have been instructed regarding the need for a repeat CAT scan in the outpatient setting by his PCP within 6 months, they both verbalized understanding        Medical Problems       Resolved Problems  Date Reviewed: 12/31/2023   None        Discharging Physician / Practitioner: Denver Ruiz MD  PCP: Nolan Manning MD  Admission Date:   Admission Orders (From admission, onward)       Ordered        03/05/24 0948  Inpatient Admission  Once            03/04/24 1611  Place in Observation  Once                          Discharge Date: 03/08/24    Consultations During Hospital Stay:  Pulmonary  Neurology  Infectious disease    Procedures Performed:   None    Significant Findings / Test Results:   X-ray trauma chest-No acute cardiopulmonary disease. Elevation of the left hemidiaphragm, stable from the most recent prior study though new from 2019.   Trauma CT chest, abdomen, pelvis with contrast-No acute traumatic CT findings. Persistent patchy airspace consolidation left lower lobe may reflect chronic atelectasis/scarring. 3 mm subpleural nodule right upper lobe laterally. Cholelithiasis. Bilateral renal cysts. Sequela of partial right nephrectomy with fat necrosis as before. Mild enhancement of the walls of the distal right ureter noted with questionable minimal nodularity along the bladder wall at the level of the right UVJ. Consider further evaluation with follow-up cystoscopy.   Trauma CT brain-no acute intracranial abnormality  CTA chest PE study-No pulmonary embolus. Ill-defined part solid 5.3 x 2.1 cm opacity in the posterior basal left lower lobe, decreased in density medially since 12/29/2023 but increased in size since May 2023. Recommend follow-up with a chest CT with no contrast in 6 months to exclude a slowly growing adenocarcinoma spectrum lesion. A few 9 mm and smaller right lung nodules. These can also be reevaluated in 6 months. Mild right lower lobe fibrosis.   CT head #2-Small posterior right vertex scalp hematoma but no calvarial fracture or acute intracranial abnormality is seen.    X-ray right knee-no acute osseous abnormality  X-ray left knee-no acute osseous abnormality  X-ray chest #2-Elevation of the left hemidiaphragm  with left lower lobe linear atelectasis. Mild pulmonary vascular congestion.       Incidental Findings:    Ill-defined part solid 5.3 x 2.1 cm opacity in the posterior basal left lower lobe, decreased in density medially since 12/29/2023 but increased in size since May 2023. Recommend follow-up with a chest CT with no contrast in 6 months to exclude a slowly growing adenocarcinoma spectrum lesion. A few 9 mm and smaller right lung nodules. These can also be reevaluated in 6 months.   Findings were reviewed with the patient and his wife, they both verbalized understanding regarding the importance of following up in the outpatient setting since this potentially could be a malignancy in the future    Test Results Pending at Discharge (will require follow up):   None     Outpatient Tests Requested:  None    Complications: None    Reason for Admission: Fall, acute hypoxic respiratory failure    Hospital Course:   Jose Roberto Herring is a 73 y.o. male patient who originally presented to the hospital on 3/4/2024 due to a fall.  Please refer to my initial history and physical examination from 3/4/2021 for the initial presenting features and complaints.  In brief, the patient is a 73-year-old male, who originally presented to the emergency room because of a fall at home.  In the emergency room he had an extensive trauma workup.  Trauma imaging results are outlined above.  He was admitted at that time because he required 4 L of supplemental oxygen via nasal cannula.  He was seen in conjunction with pulmonary.  On the first night of his hospitalization, he unfortunately had another mechanical fall which resulted in a second CAT scan of the head being ordered.  CAT scan #2 is outlined as above.  There was no acute traumatic pathology.  On day 2 of his admission, rapid response was called due to the patient becoming septic.  Patient was treated in accordance with the sepsis protocol.  He was given IV fluids, and was started on IV  "antibiotics for the possibility of a left lower lobe pneumonia.  He received 3 days worth of ceftriaxone, and 2 days worth of Zithromax while in house.  He was additionally seen in conjunction with neurology to ensure that he was not having recurrent seizure-like activity.  No medication changes were advised by neurology, he was continued on Keppra, and carbamazepine.  Pulmonary services also followed along.  Infectious disease got involved for the further management of his sepsis.  After completing a few days worth of ceftriaxone in house, the patient looked and felt much better.  He was deemed medically stable for discharge on 3/8/2024.  He was given a prescription for 4 more days worth of cefdinir.  No other changes were made to any of his preadmission medications, and/or to the preadmission doses.  He was seen by PT and OT who recommended rehab, however the patient and his wife declined and preferred to go home, case management set up home visiting nursing services.  Please refer to the assessment/plan portion of this discharge summary as outlined above for additional details regarding his stay.        Please see above list of diagnoses and related plan for additional information.     Condition at Discharge: good    Discharge Day Visit / Exam:   Subjective: Patient seen and examined, looks and feels great, wants to go home  Vitals: Blood Pressure: 139/66 (03/08/24 0700)  Pulse: 80 (03/07/24 2000)  Temperature: 97.9 °F (36.6 °C) (03/08/24 0700)  Temp Source: Temporal (03/08/24 0700)  Respirations: 16 (03/08/24 0700)  Height: 6' 2\" (188 cm) (03/05/24 1412)  Weight - Scale: 101 kg (223 lb 12.3 oz) (03/08/24 0456)  SpO2: (!) 89 % (03/07/24 2000)  Exam:   Physical Exam  Vitals and nursing note reviewed.   Constitutional:       General: He is not in acute distress.     Appearance: Normal appearance. He is not ill-appearing.   HENT:      Head: Normocephalic and atraumatic.      Nose: Nose normal.   Eyes:      " Extraocular Movements: Extraocular movements intact.      Pupils: Pupils are equal, round, and reactive to light.   Cardiovascular:      Rate and Rhythm: Normal rate and regular rhythm.      Pulses: Normal pulses.      Heart sounds: Normal heart sounds. No murmur heard.     No friction rub. No gallop.   Pulmonary:      Effort: Pulmonary effort is normal.      Breath sounds: Normal breath sounds.   Abdominal:      General: There is no distension.      Palpations: Abdomen is soft. There is no mass.      Tenderness: There is no abdominal tenderness. There is no guarding or rebound.   Musculoskeletal:         General: No swelling or tenderness. Normal range of motion.      Cervical back: Normal range of motion and neck supple. No rigidity. No muscular tenderness.      Right lower leg: No edema.      Left lower leg: No edema.   Skin:     General: Skin is warm.      Capillary Refill: Capillary refill takes less than 2 seconds.      Findings: No erythema or rash.   Neurological:      General: No focal deficit present.      Mental Status: He is alert and oriented to person, place, and time. Mental status is at baseline.   Psychiatric:         Mood and Affect: Mood normal.         Behavior: Behavior normal.          Discussion with Family: Updated  (wife) via phone.    Discharge instructions/Information to patient and family:   See after visit summary for information provided to patient and family.      Provisions for Follow-Up Care:  See after visit summary for information related to follow-up care and any pertinent home health orders.      Mobility at time of Discharge:   Basic Mobility Inpatient Raw Score: 13  -HLM Goal: 4: Move to chair/commode  JH-HLM Achieved: 5: Stand (1 or more minutes)  HLM Goal NOT achieved. Continue to encourage mobility in post discharge setting.     Disposition:   Home with VNA Services (Reminder: Complete face to face encounter)    Planned Readmission: None     Discharge  Statement:  I spent 45 minutes discharging the patient. This time was spent on the day of discharge. I had direct contact with the patient on the day of discharge. Greater than 50% of the total time was spent examining patient, answering all patient questions, arranging and discussing plan of care with patient as well as directly providing post-discharge instructions.  Additional time then spent on discharge activities.    Discharge Medications:  See after visit summary for reconciled discharge medications provided to patient and/or family.      **Please Note: This note may have been constructed using a voice recognition system**

## 2024-03-08 NOTE — RESPIRATORY THERAPY NOTE
Home Oxygen Qualifying Test     Patient name: Jose Roberto Herring        : 1950   Date of Test:  2024  Diagnosis:    Home Oxygen Test:    **Medicare Guidelines require item(s) 1-5 on all ambulatory patients or 1 and 2 on non-ambulatory patients.    1. Baseline SPO2 on Room Air at rest 92 %   If <= 88% on Room Air add O2 via NC to obtain SpO2 >=88%. If LPM needed, document LPM  needed to reach =>88%    SPO2 during exertion on Room Air 90  %  During exertion monitor SPO2. If SPO2 increases >=89%, do not add supplemental oxygen    SPO2 on Oxygen at Rest  % at  LPM    SPO2 during exertion on Oxygen  % at  LPM    Test performed during exertion activity.      []  Supplemental Home Oxygen is indicated.    [x]  Client does not qualify for home oxygen.    Respiratory Additional Notes- Pt exerted in bed with dressing to go home. Pt did not desat    Juliana Dang, RT

## 2024-03-08 NOTE — ASSESSMENT & PLAN NOTE
Patient was noted to have an O2 sat of 83% at time of arrival into the emergency room  Has resolved-patient will get an exercise desat study prior to discharge  Differential included-  1. A right lower fibrosis as noted on CT chest PE study, PE was ruled out,  2.  Chronic left hemidiaphragm elevation   3.  Sepsis-secondary to unspecified exact etiology, perhaps COVID-19 versus the possibility of a left lower lobe pneumonia  4.  Abnormal CT chest-see below -possible adenocarcinoma  Appreciate pulmonary input  Medically stable for discharge  See the remaining details as outlined below

## 2024-03-08 NOTE — CASE MANAGEMENT
Case Management Discharge Planning Note    Patient name Jose Roberto Herring  Location ICU 03/ICU - MRN [id number]  : 1950 Date 3/8/2024       Current Admission Date: 3/4/2024  Current Admission Diagnosis:Acute respiratory failure with hypoxia (HCC)   Patient Active Problem List    Diagnosis Date Noted    Sepsis without acute organ dysfunction (HCC) 2024    Acute respiratory failure with hypoxia (HCC) 2024    Fall 2024    Leukemoid reaction 2024    Abnormal CT scan, chest 2024    Malignant neoplasm of ureteric orifice (HCC) 2024    Hypomagnesemia 2023    COVID-19 2023    Stomach ulcer 2023    Seizure disorder (HCC) 2023    Essential hypertension 2023    Hypothyroidism 2023    Hyperlipidemia 2023    Possible upper GI bleed 2023    Anemia 2023    LULA (acute kidney injury) (HCC) 2023    History of bladder cancer 2018    Malignant neoplasm of overlapping sites of bladder (HCC) 2018    History of renal cell cancer 2018      LOS (days): 3  Geometric Mean LOS (GMLOS) (days): 5  Days to GMLOS:2     OBJECTIVE:  Risk of Unplanned Readmission Score: 21.32      Current admission status: Inpatient   Preferred Pharmacy:   [id number] - [place], [place] - [address]  [address] [address]  Phone: [phone] Fax: [phone]    [facility] - [place], [place] - [address]  [address] [address]  Phone: [phone] Fax: [phone]    [id number] - [place], [place] - [address]  [address] [address]  Phone: [phone] Fax: [phone]    Primary Care Provider: Nolan Manning MD    Primary Insurance: MEDICARE  Secondary Insurance: [facility]    DISCHARGE DETAILS:  Pt has been cleared by [facility] for DC.  [facility]  Wife will transport.         IMM Given (Date):: 24  IMM Given  to:: Patient (Reviewed the IMM with the pt who is in agreement with same)

## 2024-03-08 NOTE — INCIDENTAL FINDINGS
The following findings require follow up:  Radiographic finding   Finding: Ill-defined part solid 5.3 x 2.1 cm opacity in the posterior basal left lower lobe, decreased in density medially since 12/29/2023 but increased in size since May 2023. Recommend follow-up with a chest CT with no contrast in 6 months to exclude a slowly   growing adenocarcinoma spectrum lesion. A few 9 mm and smaller right lung nodules. These can also be reevaluated in 6 months.      Follow up required: CT chest in 6 months   Follow up should be done within 6 months    Please notify the following clinician to assist with the follow up:   Dr. Manning

## 2024-03-08 NOTE — ASSESSMENT & PLAN NOTE
ll-defined part solid 5.3 x 2.1 cm opacity in the posterior basal left lower lobe, decreased in density medially since 12/29/2023 but increased in size since May 2023. Recommend follow-up with a chest CT with no contrast in 6 months to exclude a slowly growing adenocarcinoma spectrum lesion. A few 9 mm and smaller right lung nodules. These can also be reevaluated in 6 months. Mild right lower lobe fibrosis.   Appreciate pulmonary input  No further inpatient testing, treatment, and/or workup is needed, patient to repeat a CAT scan of the chest in 6 months as outlined above  The patient and his wife have been instructed regarding the need for a repeat CAT scan in the outpatient setting by his PCP within 6 months, they both verbalized understanding

## 2024-03-08 NOTE — ASSESSMENT & PLAN NOTE
Sepsis parameters have significantly improved  Acute toxic metabolic encephalopathy has resolved-this was secondary to sepsis  Infectious workup thus far:-  #1.  Blood cultures x 2 sets from 3/4/2024-no growth at 72 hours  #2.  Blood cultures x 2 sets from 3/5/2024-no growth at 48 hours  #3.  Testing for RSV, and influenza A/B is negative  #4.  Urinalysis does not suggest UTI  #5.  COVID-19 testing is positive-however the patient tested positive back in December of this year.  No treatment needed for COVID-19  #6.  Repeat chest x-ray revealed some pulmonary vascular congestion-patient received IV Lasix yesterday x 1 dose  #7.  Procalcitonin testing 0.30, 2.77, 2.08  #8.  Appreciate ID input  #9.  Status post 3 days worth of ceftriaxone and Zithromax, will discharge home on 4 more days worth of cefdinir  Outpatient follow-up with PCP

## 2024-03-08 NOTE — DISCHARGE INSTR - AVS FIRST PAGE
Dear Jose Roberto Herring,     It was our pleasure to care for you here at [facility].  It is our hope that we were always able to exceed the expected standards for your care during your stay.  You were hospitalized due to a fall and probable pneumonia.  You were cared for on the medical/surgical floor by Denver Ruiz MD with the [facility] who covers for your primary care physician (PCP), Nolan Manning MD, while you were hospitalized.    You were additionally seen by the [facility] pulmonary associates-Dr. Auguste, and by the [facility]-.     If you have any questions or concerns related to this hospitalization, you may contact us at .  For follow up as well as any medication refills, we recommend that you follow up with your primary care physician.  A registered nurse will reach out to you by phone within a few days after your discharge to answer any additional questions that you may have after going home.  However, at this time we provide for you here, the most important instructions / recommendations at discharge:     Notable Medication Adjustments -   New prescription [id number] mg to 100 mg-take 1 tablet every 12 hours for 4 more days then stop  Okay to resume all other preadmission medications at the preadmission doses  Testing Required after Discharge -   To be further determined in the outpatient setting by your PCP, and a pulmonary  Important follow up information -   Please follow-up with your primary care provider and the other providers as outlined in this discharge packet  Other Instructions -   Please ensure that your primary care provider orders a repeat CAT scan of the chest in 6 months  Please maintain a healthy diet  Please review this entire after visit summary as additional general instructions including medication list, appointments, activity, diet, any pertinent wound care, and  other additional recommendations from your care team that may be provided for you.      Sincerely,     Denver Ruiz MD

## 2024-03-08 NOTE — ASSESSMENT & PLAN NOTE
Initially the patient fell prior to coming into the hospital, upon arrival the trauma imaging reviewed-no acute pathology  Patient had another fall on the night of 3/4/2024 here in the hospital  All trauma imaging has been normal  Status post the PT and OT evaluation-they have deemed that the patient is a moderate resource intensity level 2 patient, patient would benefit from rehab  The patient, and his wife declined the possibility of being transitioned to rehab  Case management has set up home health care services  Okay for discharge home today

## 2024-03-08 NOTE — PLAN OF CARE
Problem: PAIN - ADULT  Goal: Verbalizes/displays adequate comfort level or baseline comfort level  Description: Interventions:  - Encourage patient to monitor pain and request assistance  - Assess pain using appropriate pain scale  - Administer analgesics based on type and severity of pain and evaluate response  - Implement non-pharmacological measures as appropriate and evaluate response  - Consider cultural and social influences on pain and pain management  - Notify physician/advanced practitioner if interventions unsuccessful or patient reports new pain  Outcome: Progressing     Problem: INFECTION - ADULT  Goal: Absence or prevention of progression during hospitalization  Description: INTERVENTIONS:  - Assess and monitor for signs and symptoms of infection  - Monitor lab/diagnostic results  - Monitor all insertion sites, i.e. indwelling lines, tubes, and drains  - Monitor endotracheal if appropriate and nasal secretions for changes in amount and color  - [place] appropriate cooling/warming therapies per order  - Administer medications as ordered  - Instruct and encourage patient and family to use good hand hygiene technique  - Identify and instruct in appropriate isolation precautions for identified infection/condition  Outcome: Progressing  Goal: Absence of fever/infection during neutropenic period  Description: INTERVENTIONS:  - Monitor WBC    Outcome: Progressing     Problem: Knowledge Deficit  Goal: Patient/family/caregiver demonstrates understanding of disease process, treatment plan, medications, and discharge instructions  Description: Complete learning assessment and assess knowledge base.  Interventions:  - Provide teaching at level of understanding  - Provide teaching via preferred learning methods  Outcome: Progressing     Problem: RESPIRATORY - ADULT  Goal: Achieves optimal ventilation and oxygenation  Description: INTERVENTIONS:  - Assess for changes in respiratory status  - Assess for changes in  mentation and behavior  - Position to facilitate oxygenation and minimize respiratory effort  - Oxygen administered by appropriate delivery if ordered  - Initiate smoking cessation education as indicated  - Encourage broncho-pulmonary hygiene including cough, deep breathe, Incentive Spirometry  - Assess the need for suctioning and aspirate as needed  - Assess and instruct to report SOB or any respiratory difficulty  - Respiratory Therapy support as indicated  Outcome: Progressing

## 2024-03-08 NOTE — ASSESSMENT & PLAN NOTE
Continue Keppra, continue carbamazepine  Appreciate neurology input  Can pursue EEG testing in the outpatient setting  No further inpatient testing, treatment, and/or workup is needed

## 2024-03-09 LAB
BACTERIA BLD CULT: NORMAL
BACTERIA BLD CULT: NORMAL

## 2024-03-10 LAB
BACTERIA BLD CULT: NORMAL
BACTERIA BLD CULT: NORMAL

## 2024-04-08 ENCOUNTER — RA CDI HCC (OUTPATIENT)
Dept: OTHER | Facility: HOSPITAL | Age: 74
End: 2024-04-08

## 2024-04-08 PROBLEM — Z86.16 PERSONAL HISTORY OF COVID-19: Status: ACTIVE | Noted: 2024-04-08

## 2024-04-08 PROBLEM — Z86.16 PERSONAL HISTORY OF COVID-19: Status: ACTIVE | Noted: 2023-12-29

## 2024-04-12 ENCOUNTER — OFFICE VISIT (OUTPATIENT)
Dept: FAMILY MEDICINE CLINIC | Facility: CLINIC | Age: 74
End: 2024-04-12
Payer: MEDICARE

## 2024-04-12 VITALS
OXYGEN SATURATION: 97 % | DIASTOLIC BLOOD PRESSURE: 84 MMHG | BODY MASS INDEX: 28.23 KG/M2 | SYSTOLIC BLOOD PRESSURE: 124 MMHG | WEIGHT: 220 LBS | TEMPERATURE: 95.9 F | HEIGHT: 74 IN | HEART RATE: 76 BPM

## 2024-04-12 DIAGNOSIS — I10 ESSENTIAL HYPERTENSION: ICD-10-CM

## 2024-04-12 DIAGNOSIS — J44.9 CHRONIC OBSTRUCTIVE PULMONARY DISEASE, UNSPECIFIED COPD TYPE (HCC): ICD-10-CM

## 2024-04-12 DIAGNOSIS — Z00.00 ENCOUNTER FOR MEDICAL EXAMINATION TO ESTABLISH CARE: ICD-10-CM

## 2024-04-12 DIAGNOSIS — C67.8 MALIGNANT NEOPLASM OF OVERLAPPING SITES OF BLADDER (HCC): ICD-10-CM

## 2024-04-12 DIAGNOSIS — G40.909 SEIZURE DISORDER (HCC): ICD-10-CM

## 2024-04-12 DIAGNOSIS — Q28.2 AVM (ARTERIOVENOUS MALFORMATION) BRAIN: ICD-10-CM

## 2024-04-12 DIAGNOSIS — C67.6 MALIGNANT NEOPLASM OF URETERIC ORIFICE (HCC): ICD-10-CM

## 2024-04-12 DIAGNOSIS — E11.9 TYPE 2 DIABETES MELLITUS WITHOUT COMPLICATION, WITHOUT LONG-TERM CURRENT USE OF INSULIN (HCC): ICD-10-CM

## 2024-04-12 DIAGNOSIS — R93.89 ABNORMAL CT SCAN, CHEST: Primary | ICD-10-CM

## 2024-04-12 PROBLEM — R93.0 ABNORMAL MRI OF HEAD: Status: ACTIVE | Noted: 2017-05-22

## 2024-04-12 PROBLEM — N13.8 BPH WITH OBSTRUCTION/LOWER URINARY TRACT SYMPTOMS: Status: ACTIVE | Noted: 2018-01-10

## 2024-04-12 PROBLEM — N40.1 BPH WITH OBSTRUCTION/LOWER URINARY TRACT SYMPTOMS: Status: ACTIVE | Noted: 2018-01-10

## 2024-04-12 PROBLEM — H26.9 CATARACT: Status: ACTIVE | Noted: 2024-04-12

## 2024-04-12 PROCEDURE — 99204 OFFICE O/P NEW MOD 45 MIN: CPT | Performed by: FAMILY MEDICINE

## 2024-04-12 NOTE — PROGRESS NOTES
Name: Jose Roberto Herring      : 1950      MRN: [id number]  Encounter Provider: Kimani Judd MD  Encounter Date: 2024   Encounter department: [facility]    Assessment & Plan     1. Abnormal CT scan, chest  Assessment & Plan:  CT scan results and differential diagnosis which included malignancy were discussed today.  The importance of follow-up imaging were also discussed with patient and wife.  Repeat CT scan ordered today.  They will call and schedule for 6 months.  We will follow-up shortly after to discuss results.       CTA PE study 2024 : ll-defined part solid 5.3 x 2.1 cm opacity in the posterior basal left lower lobe, decreased in density medially since 2023 but increased in size since May 2023. Recommend follow-up with a chest CT with no contrast in 6 months to exclude a slowly growing adenocarcinoma spectrum lesion. A few 9 mm and smaller right lung nodules. These can also be reevaluated in 6 months. Mild right lower lobe fibrosis     Orders:  -     CT chest wo contrast; Future; Expected date: 2024    2. Type 2 diabetes mellitus without complication, without long-term current use of insulin (HCC)  Assessment & Plan:    Lab Results   Component Value Date    HGBA1C 6.4 2022   Per chart review patient has documented history of type 2 diabetes but he reports that he has prediabetes.  His last A1c was almost 2 years ago and 6.4.  [facility] to [facility] is [facility] continue to follow-up for chronic conditions as healthcare is more affordable there as well as medications.  Offered repeat lab work today but patient declined.  He says he will discuss at upcoming [facility] the [facility] for clarification on diagnosis.  He is not currently on any medication.  Recommend to continue dietary and lifestyle modification and follow[facility].      3. Essential hypertension  Assessment & Plan:  Controlled.  Continue  lisinopril.      4. Malignant neoplasm of overlapping sites of bladder (HCC)  Assessment & Plan:  Stable.  Following with urology.      5. Malignant neoplasm of ureteric orifice (HCC)    6. Seizure disorder (HCC)  Assessment & Plan:  Stable.  Follows with neurology out of network.  On Keppra and carbamazepine.      7. AVM (arteriovenous malformation) brain  Assessment & Plan:  Stable on imaging for over 20 years.  Continue to follow with neurology.      8. Chronic obstructive pulmonary disease, unspecified COPD type (HCC)  Assessment & Plan:  Stable.  Not on any inhalers.  He will [facility].      9. Encounter for medical examination to establish care        Patient reports [facility] will [facility] conditions which includes refills.  We will follow-up every 6 -12 months.  He will get CT scan scheduled for September and we will follow-up shortly after.  Will also schedule an appointment for Medicare annual wellness visit.       Subjective      Patient presents to the office today to establish care.  He was recently mid to the hospital and then had rehab at home.  He has paperwork that need to be signed.  [facility].  Has not had primary care in a while.  He says he is only here today because he needs a primary care to sign off on the paper so his insurance will cover.  He has history of bladder and urologic cancers which follows up with urology.  He reports everything is stable and has had treatment in the past and is just in surveillance mode now.  He also has history of AVMs and seizures.  He sees neurology at [facility].  He is on Keppra and carbamazepine.  Reports that seizures have been stable.  He is feeling better after discharge from the hospital.  He was admitted to the hospital with respiratory failure secondary to sepsis likely due to COVID.  Denies any respiratory symptoms.  Not on oxygen anymore.   "He does have smoking history and does get short of breath at times.  He does not have any inhalers.  During hospitalization his CT scan showed abnormal mass in the lungs.  He denies any hemoptysis or constitutional symptoms today.  Is recommended he have repeat CT scan in 6 months.    Has history of hypertension.  Reports his blood pressures are well-controlled.  There is a documented history of diabetes however patient says he does not have diabetes today.  He says he was told he has borderline.      Review of Systems   All other systems reviewed and are negative.      Current Outpatient Medications on File Prior to Visit   Medication Sig    aspirin (ECOTRIN LOW STRENGTH) 81 mg EC tablet Take 81 mg by mouth daily    carBAMazepine (TEGretol XR) 400 mg 12 hr tablet Take 400 mg by mouth 3 (three) times a day    Cyanocobalamin (VITAMIN B-12 PO) Take 1 tablet by mouth daily    levETIRAcetam (KEPPRA) 500 mg tablet Take 500 mg by mouth 2 (two) times a day    levothyroxine 75 mcg tablet Take 25 mcg by mouth daily Takes 50mcg alternating with 25mcg    lisinopril (ZESTRIL) 5 mg tablet Take 5 mg by mouth daily    pantoprazole (PROTONIX) 40 mg tablet Take 1 tablet (40 mg total) by mouth 2 (two) times a day    rosuvastatin (CRESTOR) 20 MG tablet Take 20 mg by mouth daily    Ferrous Fumarate 325 (106 Fe) MG TABS 325 mg (Patient not taking: Reported on 4/12/2024)    ferrous sulfate 324 (65 Fe) mg Take 1 PO BID before a meal (Patient not taking: Reported on 4/12/2024)       Objective     /84 (BP Location: Left arm, Patient Position: Sitting, Cuff Size: Adult)   Pulse 76   Temp (!) 95.9 °F (35.5 °C)   Ht 6' 2\" (1.88 m)   Wt 99.8 kg (220 lb)   SpO2 97%   BMI 28.25 kg/m²     Physical Exam  Vitals and nursing note reviewed.   Constitutional:       General: He is not in acute distress.     Appearance: Normal appearance. He is not ill-appearing, toxic-appearing or diaphoretic.   Eyes:      General:         Right eye: No " discharge.         Left eye: No discharge.      Extraocular Movements: Extraocular movements intact.      Conjunctiva/sclera: Conjunctivae normal.   Cardiovascular:      Rate and Rhythm: Normal rate and regular rhythm.      Pulses: Normal pulses.      Heart sounds: Normal heart sounds.   Pulmonary:      Effort: Pulmonary effort is normal.      Breath sounds: No wheezing (Scattered).   Musculoskeletal:      Cervical back: Normal range of motion and neck supple.   Neurological:      Mental Status: He is alert and oriented to person, place, and time.   Psychiatric:         Mood and Affect: Mood normal.         Behavior: Behavior normal.         Thought Content: Thought content normal.         Judgment: Judgment normal.       Kimani Judd MD

## 2024-04-18 PROBLEM — J96.01 ACUTE RESPIRATORY FAILURE WITH HYPOXIA (HCC): Status: RESOLVED | Noted: 2024-03-04 | Resolved: 2024-04-18

## 2024-04-18 PROBLEM — N17.9 AKI (ACUTE KIDNEY INJURY) (HCC): Status: RESOLVED | Noted: 2023-05-13 | Resolved: 2024-04-18

## 2024-04-18 PROBLEM — A41.9 SEPSIS WITHOUT ACUTE ORGAN DYSFUNCTION (HCC): Status: RESOLVED | Noted: 2024-03-06 | Resolved: 2024-04-18

## 2024-04-18 NOTE — ASSESSMENT & PLAN NOTE
CT scan results and differential diagnosis which included malignancy were discussed today.  The importance of follow-up imaging were also discussed with patient and wife.  Repeat CT scan ordered today.  They will call and schedule for 6 months.  We will follow-up shortly after to discuss results.       CTA PE study 03/2024 : ll-defined part solid 5.3 x 2.1 cm opacity in the posterior basal left lower lobe, decreased in density medially since 12/29/2023 but increased in size since May 2023. Recommend follow-up with a chest CT with no contrast in 6 months to exclude a slowly growing adenocarcinoma spectrum lesion. A few 9 mm and smaller right lung nodules. These can also be reevaluated in 6 months. Mild right lower lobe fibrosis

## 2024-04-18 NOTE — ASSESSMENT & PLAN NOTE
Lab Results   Component Value Date    HGBA1C 6.4 06/01/2022   Per chart review patient has documented history of type 2 diabetes but he reports that he has prediabetes.  His last A1c was almost 2 years ago and 6.4.  Prior to establishing care here [facility] and is going to continue to follow-up for chronic conditions as healthcare is more affordable there as well as medications.  Offered repeat lab work today but patient declined.  He says he will discuss at upcoming visit with the [facility] for clarification on diagnosis.  He is not currently on any medication.  Recommend to continue dietary and lifestyle modification and follow[facility] with the [facility].

## 2024-08-07 PROCEDURE — 87186 SC STD MICRODIL/AGAR DIL: CPT | Performed by: UROLOGY

## 2024-08-07 PROCEDURE — 87077 CULTURE AEROBIC IDENTIFY: CPT | Performed by: UROLOGY

## 2024-08-07 PROCEDURE — 87086 URINE CULTURE/COLONY COUNT: CPT | Performed by: UROLOGY

## 2024-08-08 ENCOUNTER — LAB REQUISITION (OUTPATIENT)
Dept: LAB | Facility: HOSPITAL | Age: 74
End: 2024-08-08
Payer: MEDICARE

## 2024-08-08 DIAGNOSIS — N39.0 URINARY TRACT INFECTION, SITE NOT SPECIFIED: ICD-10-CM

## 2024-08-10 LAB — BACTERIA UR CULT: ABNORMAL

## 2024-09-03 ENCOUNTER — HOSPITAL ENCOUNTER (OUTPATIENT)
Dept: CT IMAGING | Facility: HOSPITAL | Age: 74
Discharge: HOME | End: 2024-09-03
Attending: FAMILY MEDICINE
Payer: MEDICARE

## 2024-09-03 DIAGNOSIS — R93.89 ABNORMAL CT SCAN, CHEST: ICD-10-CM

## 2024-09-03 PROCEDURE — 71250 CT THORAX DX C-: CPT

## 2024-09-04 PROCEDURE — 88112 CYTOPATH CELL ENHANCE TECH: CPT | Performed by: PATHOLOGY

## 2024-09-05 ENCOUNTER — LAB REQUISITION (OUTPATIENT)
Dept: LAB | Facility: HOSPITAL | Age: 74
End: 2024-09-05
Payer: MEDICARE

## 2024-09-05 ENCOUNTER — APPOINTMENT (OUTPATIENT)
Dept: LAB | Facility: CLINIC | Age: 74
End: 2024-09-05
Payer: MEDICARE

## 2024-09-05 DIAGNOSIS — C67.6 MALIGNANT NEOPLASM OF URETERIC ORIFICE (HCC): ICD-10-CM

## 2024-09-05 LAB
BUN SERPL-MCNC: 17 MG/DL (ref 5–25)
CREAT SERPL-MCNC: 1.24 MG/DL (ref 0.6–1.3)
GFR SERPL CREATININE-BSD FRML MDRD: 57 ML/MIN/1.73SQ M

## 2024-09-05 PROCEDURE — 36415 COLL VENOUS BLD VENIPUNCTURE: CPT

## 2024-09-05 PROCEDURE — 84520 ASSAY OF UREA NITROGEN: CPT

## 2024-09-05 PROCEDURE — 82565 ASSAY OF CREATININE: CPT

## 2024-09-06 PROCEDURE — 88112 CYTOPATH CELL ENHANCE TECH: CPT | Performed by: PATHOLOGY

## 2024-09-18 ENCOUNTER — HOSPITAL ENCOUNTER (OUTPATIENT)
Dept: CT IMAGING | Facility: HOSPITAL | Age: 74
Discharge: HOME | End: 2024-09-18
Attending: UROLOGY
Payer: MEDICARE

## 2024-09-18 DIAGNOSIS — C67.6 MALIGNANT NEOPLASM OF URETERIC ORIFICE (HCC): ICD-10-CM

## 2024-09-18 PROCEDURE — 74178 CT ABD&PLV WO CNTR FLWD CNTR: CPT

## 2024-09-18 RX ADMIN — IOHEXOL 100 ML: 350 INJECTION, SOLUTION INTRAVENOUS at 10:50

## 2024-10-03 ENCOUNTER — TELEPHONE (OUTPATIENT)
Dept: FAMILY MEDICINE CLINIC | Facility: CLINIC | Age: 74
End: 2024-10-03

## 2024-10-10 ENCOUNTER — LAB REQUISITION (OUTPATIENT)
Dept: LAB | Facility: HOSPITAL | Age: 74
End: 2024-10-10
Payer: MEDICARE

## 2024-10-10 DIAGNOSIS — N39.0 URINARY TRACT INFECTION, SITE NOT SPECIFIED: ICD-10-CM

## 2024-10-10 PROCEDURE — 87086 URINE CULTURE/COLONY COUNT: CPT | Performed by: UROLOGY

## 2024-10-11 LAB — BACTERIA UR CULT: NORMAL

## 2024-10-14 ENCOUNTER — ANESTHESIA EVENT (OUTPATIENT)
Dept: PERIOP | Facility: HOSPITAL | Age: 74
End: 2024-10-14
Payer: MEDICARE

## 2024-10-14 ENCOUNTER — OFFICE VISIT (OUTPATIENT)
Dept: FAMILY MEDICINE CLINIC | Facility: CLINIC | Age: 74
End: 2024-10-14
Payer: MEDICARE

## 2024-10-14 VITALS
RESPIRATION RATE: 18 BRPM | HEART RATE: 96 BPM | TEMPERATURE: 95.5 F | DIASTOLIC BLOOD PRESSURE: 70 MMHG | HEIGHT: 74 IN | WEIGHT: 208.2 LBS | OXYGEN SATURATION: 98 % | SYSTOLIC BLOOD PRESSURE: 100 MMHG | BODY MASS INDEX: 26.72 KG/M2

## 2024-10-14 DIAGNOSIS — Z00.00 MEDICARE ANNUAL WELLNESS VISIT, SUBSEQUENT: Primary | ICD-10-CM

## 2024-10-14 DIAGNOSIS — E11.9 TYPE 2 DIABETES MELLITUS WITHOUT COMPLICATION, WITHOUT LONG-TERM CURRENT USE OF INSULIN (HCC): ICD-10-CM

## 2024-10-14 DIAGNOSIS — R91.8 MASS OF LOWER LOBE OF LEFT LUNG: ICD-10-CM

## 2024-10-14 DIAGNOSIS — Z01.818 PREOP TESTING: Primary | ICD-10-CM

## 2024-10-14 LAB — SL AMB POCT HEMOGLOBIN AIC: 5.9 (ref ?–6.5)

## 2024-10-14 PROCEDURE — G0444 DEPRESSION SCREEN ANNUAL: HCPCS | Performed by: FAMILY MEDICINE

## 2024-10-14 PROCEDURE — G0439 PPPS, SUBSEQ VISIT: HCPCS | Performed by: FAMILY MEDICINE

## 2024-10-14 PROCEDURE — 83036 HEMOGLOBIN GLYCOSYLATED A1C: CPT | Performed by: FAMILY MEDICINE

## 2024-10-14 NOTE — PATIENT INSTRUCTIONS
Medicare Preventive Visit Patient Instructions  Thank you for completing your Welcome to Medicare Visit or Medicare Annual Wellness Visit today. Your next wellness visit will be due in one year (10/15/2025).  The screening/preventive services that you may require over the next 5-10 years are detailed below. Some tests may not apply to you based off risk factors and/or age. Screening tests ordered at today's visit but not completed yet may show as past due. Also, please note that scanned in results may not display below.  Preventive Screenings:  Service Recommendations Previous Testing/Comments   Colorectal Cancer Screening  Colonoscopy    Fecal Occult Blood Test (FOBT)/Fecal Immunochemical Test (FIT)  Fecal DNA/Cologuard Test  Flexible Sigmoidoscopy Age: 45-75 years old   Colonoscopy: every 10 years (May be performed more frequently if at higher risk)  OR  FOBT/FIT: every 1 year  OR  Cologuard: every 3 years  OR  Sigmoidoscopy: every 5 years  Screening may be recommended earlier than age 45 if at higher risk for colorectal cancer. Also, an individualized decision between you and your healthcare provider will decide whether screening between the ages of 76-85 would be appropriate. Colonoscopy: 08/11/2023  FOBT/FIT: Not on file  Cologuard: Not on file  Sigmoidoscopy: Not on file    Screening Current     Prostate Cancer Screening Individualized decision between patient and health care provider in men between ages of 55-69   Medicare will cover every 12 months beginning on the day after your 50th birthday PSA: No results in last 5 years           Hepatitis C Screening Once for adults born between 1945 and 1965  More frequently in patients at high risk for Hepatitis C Hep C Antibody: Not on file        Diabetes Screening 1-2 times per year if you're at risk for diabetes or have pre-diabetes Fasting glucose: 88 mg/dL (12/28/2023)  A1C: 6.4 (6/1/2022)  Screening Not Indicated  History Diabetes   Cholesterol Screening Once  every 5 years if you don't have a lipid disorder. May order more often based on risk factors. Lipid panel: Not on file  Screening Not Indicated  History Lipid Disorder      Other Preventive Screenings Covered by Medicare:  Abdominal Aortic Aneurysm (AAA) Screening: covered once if your at risk. You're considered to be at risk if you have a family history of AAA or a male between the age of 65-75 who smoking at least 100 cigarettes in your lifetime.  Lung Cancer Screening: covers low dose CT scan once per year if you meet all of the following conditions: (1) Age 55-77; (2) No signs or symptoms of lung cancer; (3) Current smoker or have quit smoking within the last 15 years; (4) You have a tobacco smoking history of at least 20 pack years (packs per day x number of years you smoked); (5) You get a written order from a healthcare provider.  Glaucoma Screening: covered annually if you're considered high risk: (1) You have diabetes OR (2) Family history of glaucoma OR (3)  aged 50 and older OR (4)  American aged 65 and older  Osteoporosis Screening: covered every 2 years if you meet one of the following conditions: (1) Have a vertebral abnormality; (2) On glucocorticoid therapy for more than 3 months; (3) Have primary hyperparathyroidism; (4) On osteoporosis medications and need to assess response to drug therapy.  HIV Screening: covered annually if you're between the age of 15-65. Also covered annually if you are younger than 15 and older than 65 with risk factors for HIV infection. For pregnant patients, it is covered up to 3 times per pregnancy.    Immunizations:  Immunization Recommendations   Influenza Vaccine Annual influenza vaccination during flu season is recommended for all persons aged >= 6 months who do not have contraindications   Pneumococcal Vaccine   * Pneumococcal conjugate vaccine = PCV13 (Prevnar 13), PCV15 (Vaxneuvance), PCV20 (Prevnar 20)  * Pneumococcal polysaccharide vaccine  = PPSV23 (Pneumovax) Adults 19-65 yo with certain risk factors or if 65+ yo  If never received any pneumonia vaccine: recommend Prevnar 20 (PCV20)  Give PCV20 if previously received 1 dose of PCV13 or PPSV23   Hepatitis B Vaccine 3 dose series if at intermediate or high risk (ex: diabetes, end stage renal disease, liver disease)   Respiratory syncytial virus (RSV) Vaccine - COVERED BY MEDICARE PART D  * RSVPreF3 (Arexvy) CDC recommends that adults 60 years of age and older may receive a single dose of RSV vaccine using shared clinical decision-making (SCDM)   Tetanus (Td) Vaccine - COST NOT COVERED BY MEDICARE PART B Following completion of primary series, a booster dose should be given every 10 years to maintain immunity against tetanus. Td may also be given as tetanus wound prophylaxis.   Tdap Vaccine - COST NOT COVERED BY MEDICARE PART B Recommended at least once for all adults. For pregnant patients, recommended with each pregnancy.   Shingles Vaccine (Shingrix) - COST NOT COVERED BY MEDICARE PART B  2 shot series recommended in those 19 years and older who have or will have weakened immune systems or those 50 years and older     Health Maintenance Due:      Topic Date Due   • Hepatitis C Screening  Never done   • Colorectal Cancer Screening  11/09/2023   • Lung Cancer Screening  09/03/2025     Immunizations Due:      Topic Date Due   • Influenza Vaccine (1) 09/01/2024   • COVID-19 Vaccine (4 - 2023-24 season) 09/01/2024     Advance Directives   What are advance directives?  Advance directives are legal documents that state your wishes and plans for medical care. These plans are made ahead of time in case you lose your ability to make decisions for yourself. Advance directives can apply to any medical decision, such as the treatments you want, and if you want to donate organs.   What are the types of advance directives?  There are many types of advance directives, and each state has rules about how to use them.  You may choose a combination of any of the following:  Living will:  This is a written record of the treatment you want. You can also choose which treatments you do not want, which to limit, and which to stop at a certain time. This includes surgery, medicine, IV fluid, and tube feedings.   Durable power of  for healthcare (DPAHC):  This is a written record that states who you want to make healthcare choices for you when you are unable to make them for yourself. This person, called a proxy, is usually a family member or a friend. You may choose more than 1 proxy.  Do not resuscitate (DNR) order:  A DNR order is used in case your heart stops beating or you stop breathing. It is a request not to have certain forms of treatment, such as CPR. A DNR order may be included in other types of advance directives.  Medical directive:  This covers the care that you want if you are in a coma, near death, or unable to make decisions for yourself. You can list the treatments you want for each condition. Treatment may include pain medicine, surgery, blood transfusions, dialysis, IV or tube feedings, and a ventilator (breathing machine).  Values history:  This document has questions about your views, beliefs, and how you feel and think about life. This information can help others choose the care that you would choose.  Why are advance directives important?  An advance directive helps you control your care. Although spoken wishes may be used, it is better to have your wishes written down. Spoken wishes can be misunderstood, or not followed. Treatments may be given even if you do not want them. An advance directive may make it easier for your family to make difficult choices about your care.   Weight Management   Why it is important to manage your weight:  Being overweight increases your risk of health conditions such as heart disease, high blood pressure, type 2 diabetes, and certain types of cancer. It can also increase your  risk for osteoarthritis, sleep apnea, and other respiratory problems. Aim for a slow, steady weight loss. Even a small amount of weight loss can lower your risk of health problems.  How to lose weight safely:  A safe and healthy way to lose weight is to eat fewer calories and get regular exercise. You can lose up about 1 pound a week by decreasing the number of calories you eat by 500 calories each day.   Healthy meal plan for weight management:  A healthy meal plan includes a variety of foods, contains fewer calories, and helps you stay healthy. A healthy meal plan includes the following:  Eat whole-grain foods more often.  A healthy meal plan should contain fiber. Fiber is the part of grains, fruits, and vegetables that is not broken down by your body. Whole-grain foods are healthy and provide extra fiber in your diet. Some examples of whole-grain foods are whole-wheat breads and pastas, oatmeal, brown rice, and bulgur.  Eat a variety of vegetables every day.  Include dark, leafy greens such as spinach, kale, qamar greens, and mustard greens. Eat yellow and orange vegetables such as carrots, sweet potatoes, and winter squash.   Eat a variety of fruits every day.  Choose fresh or canned fruit (canned in its own juice or light syrup) instead of juice. Fruit juice has very little or no fiber.  Eat low-fat dairy foods.  Drink fat-free (skim) milk or 1% milk. Eat fat-free yogurt and low-fat cottage cheese. Try low-fat cheeses such as mozzarella and other reduced-fat cheeses.  Choose meat and other protein foods that are low in fat.  Choose beans or other legumes such as split peas or lentils. Choose fish, skinless poultry (chicken or turkey), or lean cuts of red meat (beef or pork). Before you cook meat or poultry, cut off any visible fat.   Use less fat and oil.  Try baking foods instead of frying them. Add less fat, such as margarine, sour cream, regular salad dressing and mayonnaise to foods. Eat fewer high-fat  foods. Some examples of high-fat foods include french fries, doughnuts, ice cream, and cakes.  Eat fewer sweets.  Limit foods and drinks that are high in sugar. This includes candy, cookies, regular soda, and sweetened drinks.  Exercise:  Exercise at least 30 minutes per day on most days of the week. Some examples of exercise include walking, biking, dancing, and swimming. You can also fit in more physical activity by taking the stairs instead of the elevator or parking farther away from stores. Ask your healthcare provider about the best exercise plan for you.      © Copyright [employer] 2018 Information is for End User's use only and may not be sold, redistributed or otherwise used for commercial purposes. All illustrations and images included in CareNotes® are the copyrighted property of [employer] or ki work

## 2024-10-14 NOTE — PROGRESS NOTES
Diabetic Foot Exam    Patient's shoes and socks removed.    Right Foot/Ankle   Right Foot Inspection  Skin Exam: skin normal and skin intact. No dry skin, no warmth, no callus, no erythema, no maceration, no abnormal color, no pre-ulcer, no ulcer and no callus.     Toe Exam: ROM and strength within normal limits.     Sensory   Monofilament testing: intact    Vascular  The right DP pulse is 2+. The right PT pulse is 2+.     Left Foot/Ankle  Left Foot Inspection  Skin Exam: skin normal and skin intact. No dry skin, no warmth, no erythema, no maceration, normal color, no pre-ulcer, no ulcer and no callus.     Toe Exam: ROM and strength within normal limits.     Sensory   Monofilament testing: intact    Vascular  The left DP pulse is 2+. The left PT pulse is 2+.     Assign Risk Category  No deformity present  No loss of protective sensation  No weak pulses  Risk: 0  Ambulatory Visit  Name: Jose Roberto Herring      : 1950      MRN: [id number]  Encounter Provider: Kimani Judd MD  Encounter Date: 10/14/2024   Encounter department: [facility] [place]    Assessment & Plan  Medicare annual wellness visit, subsequent         Type 2 diabetes mellitus without complication, without long-term current use of insulin ([facility])    Lab Results   Component Value Date    HGBA1C 5.9 10/14/2024       Orders:    POCT hemoglobin A1c    Mass of lower lobe of left lung  Enlarging left lower lobe mass.  Patient has follow-up PET scan scheduled [facility].          Depression Screening and Follow-up Plan: Patient was screened for depression during today's encounter. They screened negative with a PHQ-2 score of 0.      Preventive health issues were discussed with patient, and age appropriate screening tests were ordered as noted in patient's After Visit Summary. Personalized health advice and appropriate referrals for health education or preventive services given if needed, as noted in patient's  After Visit Summary.    History of Present Illness     Patient presents to the office today for Medicare annual wellness visit and follow-up.  Recently had CT scan showed enlarging pulmonary mass.  He was informed and he [facility] and they are in the process of following up on the mass.  He has a PET scan scheduled for Wednesday.  He feels okay today.  No respiratory symptoms.        Patient Care Team:  iKmani Judd MD as PCP - General (Family Medicine)  Marlon Bernal MD    Review of Systems   All other systems reviewed and are negative.    Medical History Reviewed by provider this encounter:  Tobacco  Allergies  Meds  Problems  Med Hx  Surg Hx  Fam Hx       Annual Wellness Visit Questionnaire   Jose Roberto is here for his Subsequent Wellness visit. Last Medicare Wellness visit information reviewed, patient interviewed and updates made to the record today.      Health Risk Assessment:   Patient rates overall health as good. Patient feels that their physical health rating is same. Patient is satisfied with their life. Eyesight was rated as same. Hearing was rated as same. Patient feels that their emotional and mental health rating is slightly worse. Patients states they are never, rarely angry. Patient states they are sometimes unusually tired/fatigued. Pain experienced in the last 7 days has been a lot. Patient's pain rating has been 7/10. Patient states that he has experienced weight loss or gain in last 6 months.     Depression Screening:   PHQ-2 Score: 0      Fall Risk Screening:   In the past year, patient has experienced: no history of falling in past year      Home Safety:  Patient does not have trouble with stairs inside or outside of their home. Patient has working smoke alarms and has working carbon monoxide detector. Home safety hazards include: none.     Nutrition:   Current diet is Regular.     Medications:   Patient is currently taking over-the-counter  supplements. OTC medications include: see medication list. Patient is able to manage medications.     Activities of Daily Living (ADLs)/Instrumental Activities of Daily Living (IADLs):   Walk and transfer into and out of bed and chair?: Yes  Dress and groom yourself?: Yes    Bathe or shower yourself?: Yes    Feed yourself? Yes  Do your laundry/housekeeping?: Yes  Manage your money, pay your bills and track your expenses?: Yes  Make your own meals?: Yes    Do your own shopping?: Yes    Previous Hospitalizations:   Any hospitalizations or ED visits within the last 12 months?: No      Advance Care Planning:   Living will: Yes    Durable POA for healthcare: Yes    Advanced directive: Yes    Advanced directive counseling given: Yes    Five wishes given: No    End of Life Decisions reviewed with patient: Yes      Cognitive Screening:   Provider or family/friend/caregiver concerned regarding cognition?: No    PREVENTIVE SCREENINGS      Cardiovascular Screening:    General: Screening Not Indicated, History Lipid Disorder and Patient Declines      Diabetes Screening:     General: Screening Not Indicated and History Diabetes      Colorectal Cancer Screening:     General: Screening Current      Prostate Cancer Screening:    General: Risks and Benefits Discussed and Screening Not Indicated      Osteoporosis Screening:    General: Risks and Benefits Discussed, Screening Not Indicated and Patient Declines      Abdominal Aortic Aneurysm (AAA) Screening:    Risk factors include: age between 65-74 yo and tobacco use        General: Patient Declines      Lung Cancer Screening:     General: Screening Current      Hepatitis C Screening:    General: Risks and Benefits Discussed    Hep C Screening Accepted: No     Screening, Brief Intervention, and Referral to Treatment (SBIRT)    Screening  Typical number of drinks in a day: 0  Typical number of drinks in a week: 0  Interpretation: Low risk drinking behavior.    Single Item Drug  "Screening:  How often have you used an illegal drug (including marijuana) or a prescription medication for non-medical reasons in the past year? never    Single Item Drug Screen Score: 0  Interpretation: Negative screen for possible drug use disorder    Brief Intervention  Alcohol & drug use screenings were reviewed. No concerns regarding substance use disorder identified.     Annual Depression Screening  Time spent screening and evaluating the patient for depression during today's encounter was 5 minutes.    Other Counseling Topics:   Car/seat belt/driving safety, skin self-exam, sunscreen and regular weightbearing exercise.     Social Determinants of Health     Food Insecurity: No Food Insecurity (10/14/2024)    Hunger Vital Sign     Worried About Running Out of Food in the Last Year: Never true     Ran Out of Food in the Last Year: Never true   Transportation Needs: No Transportation Needs (10/14/2024)    PRAPARE - Transportation     Lack of Transportation (Medical): No     Lack of Transportation (Non-Medical): No   Housing Stability: Low Risk  (10/14/2024)    Housing Stability Vital Sign     Unable to Pay for Housing in the Last Year: No     Number of Times Moved in the Last Year: 0     Homeless in the Last Year: No   Utilities: Not At Risk (10/14/2024)    [facility] Utilities     Threatened with loss of utilities: No     No results found.    Objective     /70 (BP Location: Left arm, Patient Position: Sitting, Cuff Size: Standard)   Pulse 96   Temp (!) 95.5 °F (35.3 °C) (Tympanic)   Resp 18   Ht 6' 2\" (1.88 m)   Wt 94.4 kg (208 lb 3.2 oz)   SpO2 98%   BMI 26.73 kg/m²     Physical Exam  Vitals and nursing note reviewed.   Constitutional:       General: He is not in acute distress.     Appearance: Normal appearance. He is well-developed. He is not ill-appearing, toxic-appearing or diaphoretic.   HENT:      Head: Normocephalic and atraumatic.   Eyes:      General:         Right eye: No discharge.         Left " eye: No discharge.      Extraocular Movements: Extraocular movements intact.      Conjunctiva/sclera: Conjunctivae normal.   Cardiovascular:      Rate and Rhythm: Normal rate.      Pulses: no weak pulses.           Dorsalis pedis pulses are 2+ on the right side and 2+ on the left side.        Posterior tibial pulses are 2+ on the right side and 2+ on the left side.   Pulmonary:      Effort: Pulmonary effort is normal.   Musculoskeletal:      Cervical back: Normal range of motion and neck supple.   Feet:      Right foot:      Skin integrity: No ulcer, skin breakdown, erythema, warmth, callus or dry skin.      Left foot:      Skin integrity: No ulcer, skin breakdown, erythema, warmth, callus or dry skin.   Skin:     General: Skin is dry.      Capillary Refill: Capillary refill takes less than 2 seconds.   Neurological:      Mental Status: He is alert and oriented to person, place, and time.   Psychiatric:         Mood and Affect: Mood normal.         Behavior: Behavior normal.         Thought Content: Thought content normal.         Judgment: Judgment normal.

## 2024-10-15 NOTE — ASSESSMENT & PLAN NOTE
Lab Results   Component Value Date    HGBA1C 5.9 10/14/2024       Orders:    POCT hemoglobin A1c

## 2024-10-17 RX ORDER — CARBAMAZEPINE 400 MG/1
400 TABLET, EXTENDED RELEASE ORAL 3 TIMES DAILY
COMMUNITY

## 2024-10-17 RX ORDER — MULTIVIT-MIN/IRON/FOLIC ACID/K 18-600-40
CAPSULE ORAL DAILY
COMMUNITY

## 2024-10-17 RX ORDER — ACETAMINOPHEN 325 MG/1
325 TABLET ORAL 3 TIMES DAILY
COMMUNITY

## 2024-10-17 NOTE — PRE-PROCEDURE INSTRUCTIONS
Pre-Surgery Instructions:   Medication Instructions    acetaminophen (TYLENOL) 325 mg tablet Take day of surgery.    carBAMazepine (TEGretol XR) 400 mg 12 hr tablet Take day of surgery.    Cyanocobalamin (VITAMIN B-12 PO) Stop taking 7 days prior to surgery.    ferrous sulfate 324 (65 Fe) mg Hold day of surgery.    levETIRAcetam (KEPPRA) 500 mg tablet Take day of surgery.    levothyroxine 75 mcg tablet Take day of surgery.    pantoprazole (PROTONIX) 40 mg tablet Take day of surgery.    rosuvastatin (CRESTOR) 20 MG tablet Take night before surgery    Vitamin D, Cholecalciferol, 50 MCG (2000 UT) CAPS Stop taking 7 days prior to surgery.   Medication instructions for day surgery reviewed. Please use only a sip of water to take your instructed medications. Avoid all over the counter vitamins, supplements and NSAIDS for one week prior to surgery per anesthesia guidelines. Tylenol is ok to take as needed.     You will receive a call one business day prior to surgery with an arrival time and hospital directions. If your surgery is scheduled on a Monday, the hospital will be calling you on the Friday prior to your surgery. If you have not heard from anyone by 8pm, please call the hospital supervisor through the hospital  at [phone]. ([place] [phone] or [place] [phone]).    Do not eat or drink anything after midnight the night before your surgery, including candy, mints, lifesavers, or chewing gum. Do not drink alcohol 24hrs before your surgery. Try not to smoke at least 24hrs before your surgery.       Follow the pre surgery showering instructions as listed in the “My Surgical Experience Booklet” or otherwise provided by your surgeon's office. Do not use a blade to shave the surgical area 1 week before surgery. It is okay to use a clean electric clippers up to 24 hours before surgery. Do not apply any lotions, creams, including makeup, cologne, deodorant, or perfumes after showering on the day of  your surgery. Do not use dry shampoo, hair spray, hair gel, or any type of hair products.     No contact lenses, eye make-up, or artificial eyelashes. Remove nail polish, including gel polish, and any artificial, gel, or acrylic nails if possible. Remove all jewelry including rings and body piercing jewelry.     Wear causal clothing that is easy to take on and off. Consider your type of surgery.    Keep any valuables, jewelry, piercings at home. Please bring any specially ordered equipment (sling, braces) if indicated.    Arrange for a responsible person to drive you to and from the hospital on the day of your surgery. Please confirm the visitor policy for the day of your procedure when you receive your phone call with an arrival time.     Call the surgeon's office with any new illnesses, exposures, or additional questions prior to surgery.    Please reference your “My Surgical Experience Booklet” for additional information to prepare for your upcoming surgery.

## 2024-10-28 ENCOUNTER — APPOINTMENT (OUTPATIENT)
Dept: RADIOLOGY | Facility: HOSPITAL | Age: 74
End: 2024-10-28
Payer: MEDICARE

## 2024-10-28 ENCOUNTER — HOSPITAL ENCOUNTER (OUTPATIENT)
Facility: HOSPITAL | Age: 74
Setting detail: OUTPATIENT SURGERY
Discharge: HOME | End: 2024-10-28
Attending: UROLOGY | Admitting: UROLOGY
Payer: MEDICARE

## 2024-10-28 ENCOUNTER — ANESTHESIA (OUTPATIENT)
Dept: PERIOP | Facility: HOSPITAL | Age: 74
End: 2024-10-28
Payer: MEDICARE

## 2024-10-28 VITALS
DIASTOLIC BLOOD PRESSURE: 69 MMHG | BODY MASS INDEX: 26.69 KG/M2 | RESPIRATION RATE: 20 BRPM | WEIGHT: 208 LBS | HEIGHT: 74 IN | OXYGEN SATURATION: 96 % | TEMPERATURE: 97 F | SYSTOLIC BLOOD PRESSURE: 134 MMHG | HEART RATE: 73 BPM

## 2024-10-28 DIAGNOSIS — C67.9 MALIGNANT NEOPLASM OF BLADDER, UNSPECIFIED (HCC): ICD-10-CM

## 2024-10-28 DIAGNOSIS — N35.919 UNSPECIFIED URETHRAL STRICTURE, MALE, UNSPECIFIED SITE: ICD-10-CM

## 2024-10-28 LAB
BILIRUB UR QL STRIP: NEGATIVE
CLARITY UR: CLEAR
COLOR UR: YELLOW
GLUCOSE UR STRIP-MCNC: NEGATIVE MG/DL
HGB UR QL STRIP.AUTO: NEGATIVE
KETONES UR STRIP-MCNC: NEGATIVE MG/DL
LEUKOCYTE ESTERASE UR QL STRIP: NEGATIVE
NITRITE UR QL STRIP: NEGATIVE
PH UR STRIP.AUTO: 6.5 [PH]
PROT UR STRIP-MCNC: NEGATIVE MG/DL
SP GR UR STRIP.AUTO: 1.02
UROBILINOGEN UR QL STRIP.AUTO: 0.2 E.U./DL

## 2024-10-28 PROCEDURE — C2617 STENT, NON-COR, TEM W/O DEL: HCPCS | Performed by: UROLOGY

## 2024-10-28 PROCEDURE — C1769 GUIDE WIRE: HCPCS | Performed by: UROLOGY

## 2024-10-28 PROCEDURE — 87086 URINE CULTURE/COLONY COUNT: CPT | Performed by: UROLOGY

## 2024-10-28 PROCEDURE — 74420 UROGRAPHY RTRGR +-KUB: CPT

## 2024-10-28 PROCEDURE — C1758 CATHETER, URETERAL: HCPCS | Performed by: UROLOGY

## 2024-10-28 PROCEDURE — 81003 URINALYSIS AUTO W/O SCOPE: CPT | Performed by: UROLOGY

## 2024-10-28 DEVICE — STENT URETERAL 7FR 24CM INLAY OPTIMA W/HYDROGLIDE GDWR: Type: IMPLANTABLE DEVICE | Site: URETER | Status: FUNCTIONAL

## 2024-10-28 RX ORDER — ONDANSETRON 2 MG/ML
INJECTION INTRAMUSCULAR; INTRAVENOUS AS NEEDED
Status: DISCONTINUED | OUTPATIENT
Start: 2024-10-28 | End: 2024-10-28

## 2024-10-28 RX ORDER — ONDANSETRON 2 MG/ML
4 INJECTION INTRAMUSCULAR; INTRAVENOUS ONCE AS NEEDED
Status: DISCONTINUED | OUTPATIENT
Start: 2024-10-28 | End: 2024-10-28 | Stop reason: HOSPADM

## 2024-10-28 RX ORDER — HYDROMORPHONE HCL/PF 1 MG/ML
0.25 SYRINGE (ML) INJECTION
Status: DISCONTINUED | OUTPATIENT
Start: 2024-10-28 | End: 2024-10-28 | Stop reason: HOSPADM

## 2024-10-28 RX ORDER — DEXAMETHASONE SODIUM PHOSPHATE 10 MG/ML
INJECTION, SOLUTION INTRAMUSCULAR; INTRAVENOUS AS NEEDED
Status: DISCONTINUED | OUTPATIENT
Start: 2024-10-28 | End: 2024-10-28

## 2024-10-28 RX ORDER — CEFTRIAXONE 1 G/50ML
1000 INJECTION, SOLUTION INTRAVENOUS ONCE
Status: COMPLETED | OUTPATIENT
Start: 2024-10-28 | End: 2024-10-28

## 2024-10-28 RX ORDER — FENTANYL CITRATE/PF 50 MCG/ML
25 SYRINGE (ML) INJECTION
Status: DISCONTINUED | OUTPATIENT
Start: 2024-10-28 | End: 2024-10-28 | Stop reason: HOSPADM

## 2024-10-28 RX ORDER — PROPOFOL 10 MG/ML
INJECTION, EMULSION INTRAVENOUS AS NEEDED
Status: DISCONTINUED | OUTPATIENT
Start: 2024-10-28 | End: 2024-10-28

## 2024-10-28 RX ORDER — EPHEDRINE SULFATE 50 MG/ML
INJECTION INTRAVENOUS AS NEEDED
Status: DISCONTINUED | OUTPATIENT
Start: 2024-10-28 | End: 2024-10-28

## 2024-10-28 RX ORDER — MAGNESIUM HYDROXIDE 1200 MG/15ML
LIQUID ORAL AS NEEDED
Status: DISCONTINUED | OUTPATIENT
Start: 2024-10-28 | End: 2024-10-28 | Stop reason: HOSPADM

## 2024-10-28 RX ORDER — CEFPODOXIME PROXETIL 200 MG/1
200 TABLET, FILM COATED ORAL 2 TIMES DAILY
Qty: 14 TABLET | Refills: 0 | Status: SHIPPED | OUTPATIENT
Start: 2024-10-28 | End: 2024-11-04

## 2024-10-28 RX ORDER — ASPIRIN 325 MG
325 TABLET ORAL 3 TIMES DAILY
COMMUNITY

## 2024-10-28 RX ORDER — LIDOCAINE HYDROCHLORIDE 10 MG/ML
INJECTION, SOLUTION EPIDURAL; INFILTRATION; INTRACAUDAL; PERINEURAL AS NEEDED
Status: DISCONTINUED | OUTPATIENT
Start: 2024-10-28 | End: 2024-10-28

## 2024-10-28 RX ORDER — GLYCOPYRROLATE 0.2 MG/ML
INJECTION INTRAMUSCULAR; INTRAVENOUS AS NEEDED
Status: DISCONTINUED | OUTPATIENT
Start: 2024-10-28 | End: 2024-10-28

## 2024-10-28 RX ORDER — SODIUM CHLORIDE 9 MG/ML
INJECTION, SOLUTION INTRAVENOUS AS NEEDED
Status: DISCONTINUED | OUTPATIENT
Start: 2024-10-28 | End: 2024-10-28 | Stop reason: HOSPADM

## 2024-10-28 RX ORDER — SODIUM CHLORIDE, SODIUM LACTATE, POTASSIUM CHLORIDE, CALCIUM CHLORIDE 600; 310; 30; 20 MG/100ML; MG/100ML; MG/100ML; MG/100ML
INJECTION, SOLUTION INTRAVENOUS CONTINUOUS PRN
Status: DISCONTINUED | OUTPATIENT
Start: 2024-10-28 | End: 2024-10-28

## 2024-10-28 RX ORDER — FENTANYL CITRATE 50 UG/ML
INJECTION, SOLUTION INTRAMUSCULAR; INTRAVENOUS AS NEEDED
Status: DISCONTINUED | OUTPATIENT
Start: 2024-10-28 | End: 2024-10-28

## 2024-10-28 RX ADMIN — GLYCOPYRROLATE 0.2 MG: 0.2 INJECTION INTRAMUSCULAR; INTRAVENOUS at 11:55

## 2024-10-28 RX ADMIN — FENTANYL CITRATE 25 MCG: 50 INJECTION, SOLUTION INTRAMUSCULAR; INTRAVENOUS at 11:30

## 2024-10-28 RX ADMIN — FENTANYL CITRATE 25 MCG: 50 INJECTION, SOLUTION INTRAMUSCULAR; INTRAVENOUS at 10:51

## 2024-10-28 RX ADMIN — CEFTRIAXONE 1000 MG: 1 INJECTION, SOLUTION INTRAVENOUS at 11:00

## 2024-10-28 RX ADMIN — FENTANYL CITRATE 25 MCG: 50 INJECTION, SOLUTION INTRAMUSCULAR; INTRAVENOUS at 10:48

## 2024-10-28 RX ADMIN — PROPOFOL 20 MG: 10 INJECTION, EMULSION INTRAVENOUS at 12:11

## 2024-10-28 RX ADMIN — SODIUM CHLORIDE, SODIUM LACTATE, POTASSIUM CHLORIDE, AND CALCIUM CHLORIDE: .6; .31; .03; .02 INJECTION, SOLUTION INTRAVENOUS at 10:48

## 2024-10-28 RX ADMIN — FENTANYL CITRATE 25 MCG: 50 INJECTION, SOLUTION INTRAMUSCULAR; INTRAVENOUS at 11:46

## 2024-10-28 RX ADMIN — ONDANSETRON 4 MG: 2 INJECTION INTRAMUSCULAR; INTRAVENOUS at 10:54

## 2024-10-28 RX ADMIN — PROPOFOL 180 MG: 10 INJECTION, EMULSION INTRAVENOUS at 10:51

## 2024-10-28 RX ADMIN — LIDOCAINE HYDROCHLORIDE 100 MG: 10 INJECTION, SOLUTION EPIDURAL; INFILTRATION; INTRACAUDAL; PERINEURAL at 10:51

## 2024-10-28 RX ADMIN — EPHEDRINE SULFATE 10 MG: 50 INJECTION, SOLUTION INTRAVENOUS at 11:02

## 2024-10-28 RX ADMIN — DEXAMETHASONE SODIUM PHOSPHATE 10 MG: 10 INJECTION, SOLUTION INTRAMUSCULAR; INTRAVENOUS at 10:54

## 2024-10-28 NOTE — INTERVAL H&P NOTE
H&P reviewed. After examining the patient I find no changes in the patients condition since the H&P had been written.    Vitals:    10/28/24 0840   BP: 126/80   Pulse: 64   Resp: 17   Temp: (!) 97 °F (36.1 °C)   SpO2: 98%

## 2024-10-28 NOTE — DISCHARGE INSTR - AVS FIRST PAGE
Take cefpodoxime 200 mg twice a day starting tomorrow for 3 days total and then stop the cefpodoxime.  Save the remainder of the cefpodoxime to restart the night before stent removal.  Dr. Motley's office will call for appointment for stent removal.  Continue self-catheterization as before for the urethral stricture.

## 2024-10-28 NOTE — ANESTHESIA PREPROCEDURE EVALUATION
Procedure:  CYSTOSCOPY; POSSIBLE DILATION OF URETHRAL STRICTURE; POSSIBLE DVIU/URETHROTOMY (Urethra)  POSSIBLE BLADDER BIOPSIES; POSSIBLE TRANSURETHRAL RESECTION OF BLADDER TUMOR (TURBT) (Bladder)  RIGHT RETROGRADE; POSSIBLE URETEROSCOPY, URETERAL BIOPSY AND STENT INSERTION (Ureter)    EF60%, PASP 53    Hx of COPD    Seizure dx on Keppra/Tegratol    LMA 5 previously    Relevant Problems   CARDIO   (+) Essential hypertension   (+) Hyperlipidemia      ENDO   (+) Hypothyroidism   (+) Type 2 diabetes mellitus without complications (HCC)      GI/HEPATIC   (+) Possible upper GI bleed   (+) Stomach ulcer      /RENAL   (+) BPH with obstruction/lower urinary tract symptoms      HEMATOLOGY   (+) Anemia      NEURO/PSYCH   (+) Seizure disorder (HCC)      PULMONARY   (+) Chronic obstructive lung disease (HCC)        Physical Exam    Airway    Mallampati score: II  TM Distance: >3 FB  Neck ROM: full     Dental    upper dentures and lower dentures,     Cardiovascular  Rhythm: regular, Rate: normal, Cardiovascular exam normal    Pulmonary  Pulmonary exam normal Breath sounds clear to auscultation    Other Findings        Anesthesia Plan  ASA Score- 3     Anesthesia Type- general with ASA Monitors.         Additional Monitors:     Airway Plan: LMA.    Comment: Risks of general anesthesia discussed including likely possibility of PONV and sore throat, as well as the rare possibilities of aspiration, dental/oropharyngeal/ocular injuries, or grave/life threatening anesthetic and surgical emergencies..       Plan Factors-Exercise tolerance (METS): >4 METS.    Chart reviewed.    Patient summary reviewed.      Patient instructed to abstain from smoking on day of procedure. Patient did not smoke on day of surgery.            Induction- intravenous.    Postoperative Plan- Plan for postoperative opioid use. Planned trial extubation        Informed Consent- Anesthetic plan and risks discussed with patient.  I personally reviewed this  patient with the CRNA. Discussed and agreed on the Anesthesia Plan with the CRNA..

## 2024-10-28 NOTE — OP NOTE
OPERATIVE REPORT  PATIENT NAME: Jose Roberto Herring    :  1950  MRN: [id number]  Pt Location: [facility] OR ROOM 02    SURGERY DATE: 10/28/2024    Surgeons and Role:     * Rasheed Motley MD - Primary    Preop Diagnosis:  Unspecified urethral stricture, male, unspecified site [N35.919]  Right hydroureteronephrosis  History of bladder cancer at the right ureteral orifice    Postop diagnosis:  Urethral stricture  Right ureteral stricture at the ureteral orifice    Procedure(s):  CYSTOSCOPY; DILATION OF URETHRAL STRICTURE; RIGHT RETROGRADE; DILATION OF RIGHT URETERAL STRICTURE; INSERTION OF RIGHT URETERAL STENT    Specimen(s):  ID Type Source Tests Collected by Time Destination   A :  Urine Urine, Other URINE CULTURE, URINALYSIS WITH REFLEX TO SCOPE Rasheed Motley MD 10/28/2024 1102        Estimated Blood Loss:   Minimal    Drains:  Ureteral Internal Stent Right ureter 7 Fr. (Active)   Number of days: 0       Anesthesia Type:   General    Operative Indications:  Unspecified urethral stricture, male, unspecified site [N35.919]  Malignant neoplasm of bladder, unspecified (HCC) [C67.9]  This is a 74-year-old male with a history of a urethral stricture managed with self intermittent catheterization.  He also has a history of a superficial bladder tumor overlying the right ureteral orifice status post resection.  Follow-up CT urogram shows mild hydroureteronephrosis with incomplete filling of the distal ureter.  Options, procedures, benefits and risks were discussed and he agrees to the planned procedure to reevaluate his urethral stricture with possible redilation as well as to rule out recurrent bladder tumor or right ureteral tumor.    Operative Findings:  Diffuse anterior urethral stricture which was passed with minimal to moderate resistance with 17 Iranian and 22 Iranian cystoscope.  Extremely tiny right ureteral orifice secondary to very distal ureteral stricture.  Good clear efflux of urine was seen through the  orifice.      Complications:   None    Procedure and Technique:  The patient was properly identified in the operating room and after adequate general anesthesia was placed in the lithotomy position.  The lower abdomen and genitalia were prepped and draped in the usual fashion.  Cystourethroscopy was performed with a 17 Iranian cystoscope which was able to pass through the urethral stricture with minimal to moderate resistance.  Bladder was entered and urine was drained and sent for urinalysis and culture.  Cystoscopy was performed with 30 degree and 70 degree lenses.  The bladder was free of any suspicious lesions.  The left ureteral orifice was normal.  The right ureteral orifice was described as above.  A Solo guidewire was passed through the cystoscope into the bladder and the cystoscope was removed leaving the wire in place.  A 22 Iranian cystoscope was then placed under direct vision with minimal to moderate resistance.  Right retrograde urogram was then attempted.  However, the orifice was much too small for the 5 Iranian catheter and the 0.038 solo guidewire was also much to large to pass.  With the use of a 70 degree lens and deflecting element, a 0.018 flexible tip wire was able to be passed into the ureteral orifice and up the ureter under fluoroscopic guidance.  Attempt was made to pass the 5 Iranian open-ended catheter without success into the orifice.  A ureteral dilation catheter set was then utilized.  The smallest size was 6 Iranian and this was able to be passed with some manipulation and then resistance until it popped through the orifice.  The ureteral orifice was then sequentially dilated from 6 Iranian to 16 Iranian with ureteral dilating catheters with very little minimal residual resistance.  A 5 Iranian open-ended catheter was then passed over the wire into the ureter and the wire was removed.  Retrograde urogram was then performed under fluoroscopic guidance.  The distal ureter was dilated up until  the orifice.  There were no filling defects.  The remainder of the ureter was minimally dilated with no filling defects.  The collecting system was also minimally dilated with no filling defects.  A 0.038 solo guidewire was passed through the catheter into the collecting system and the catheter was removed.  Cystoscopy then showed a very wide open right ureteral orifice.  The distal couple of centimeters of the ureter could be visualized through the bladder and there was no sign of any suspicious lesion.  The ureteral catheter was then passed into the collecting system and the ureteral length was measured.  The catheter was removed.  A 7 Greek 24 cm Bard inlay Wynot stent was passed over the wire and the wire was removed leaving a good curl of stent within the upper pole and within the bladder.  The bladder was emptied and the cystoscope was removed.  The patient tolerated the procedure well and left the operating room in good condition.   I was present for the entire procedure.    Patient Disposition:  PACU              SIGNATURE: Rasheed Motley MD  DATE: October 28, 2024  TIME: 12:24 PM

## 2024-10-28 NOTE — ANESTHESIA POSTPROCEDURE EVALUATION
Post-Op Assessment Note    CV Status:  Stable  Pain Score: 0    Pain management: adequate    Multimodal analgesia used between 6 hours prior to anesthesia start to PACU discharge    Mental Status:  Alert and awake   Hydration Status:  Euvolemic   PONV Controlled:  Controlled   Airway Patency:  Patent  Two or more mitigation strategies used for obstructive sleep apnea   Post Op Vitals Reviewed: Yes    No anethesia notable event occurred.    Staff: CRNA           Last Filed PACU Vitals:  Vitals Value Taken Time   Temp 98    Pulse 73 10/28/24 1228   /78    Resp 12 10/28/24 1228   SpO2 100 % 10/28/24 1228   Vitals shown include unfiled device data.    Modified Mian:  No data recorded

## 2024-10-29 LAB — BACTERIA UR CULT: NORMAL

## 2024-11-11 ENCOUNTER — TELEPHONE (OUTPATIENT)
Dept: FAMILY MEDICINE CLINIC | Facility: CLINIC | Age: 74
End: 2024-11-11

## 2024-11-11 NOTE — TELEPHONE ENCOUNTER
Call placed to pt to schedule diabetic eye exam. LMOM. Please schedule NV for IRIS between 11/19-11/29.

## 2024-12-18 ENCOUNTER — HOSPITAL ENCOUNTER (OUTPATIENT)
Dept: ULTRASOUND IMAGING | Facility: HOSPITAL | Age: 74
Discharge: HOME | End: 2024-12-18
Attending: UROLOGY
Payer: MEDICARE

## 2024-12-18 DIAGNOSIS — N13.1 HYDRONEPHROSIS WITH URETERAL STRICTURE, NOT ELSEWHERE CLASSIFIED: ICD-10-CM

## 2024-12-18 PROCEDURE — 76775 US EXAM ABDO BACK WALL LIM: CPT

## 2025-01-14 ENCOUNTER — APPOINTMENT (OUTPATIENT)
Dept: LAB | Facility: CLINIC | Age: 75
End: 2025-01-14
Payer: MEDICARE

## 2025-01-14 DIAGNOSIS — R35.1 NOCTURIA: ICD-10-CM

## 2025-01-14 LAB
PSA FREE MFR SERPL: 22.89 %
PSA FREE SERPL-MCNC: 0.74 NG/ML
PSA SERPL-MCNC: 3.22 NG/ML (ref 0–4)

## 2025-01-14 PROCEDURE — 84154 ASSAY OF PSA FREE: CPT

## 2025-01-14 PROCEDURE — 84153 ASSAY OF PSA TOTAL: CPT

## 2025-01-14 PROCEDURE — 36415 COLL VENOUS BLD VENIPUNCTURE: CPT

## (undated) DEVICE — SPONGE STICK WITH PVP-I: Brand: KENDALL

## (undated) DEVICE — LEGGINGS: Brand: CONVERTORS

## (undated) DEVICE — DECANTER: Brand: UNBRANDED

## (undated) DEVICE — PACK TUR

## (undated) DEVICE — FIRST STEP BEDSIDE KIT - STAND-UP POUCH, ENDOSCOPIC CLEANING PAD - 1 POUCH: Brand: FIRST STEP BEDSIDE KIT - STAND-UP POUCH, ENDOSCOPIC CLEANING PAD

## (undated) DEVICE — SYRINGE 20ML LL

## (undated) DEVICE — BAG URINE DRAINAGE 2000ML ANTI RFLX LF

## (undated) DEVICE — GLOVE SRG BIOGEL 8.5

## (undated) DEVICE — HEYMAN DILATOR 14 FR

## (undated) DEVICE — TUBING SUCTION 5MM X 12 FT

## (undated) DEVICE — BASIC SINGLE BASIN-LF: Brand: MEDLINE INDUSTRIES, INC.

## (undated) DEVICE — URETERAL CATH 8 FR CONE TIP

## (undated) DEVICE — DRAPE C-ARM X-RAY

## (undated) DEVICE — GLOVE PI ULTRA TOUCH SZ.8.0

## (undated) DEVICE — HEYMAN DILATOR 18 FR

## (undated) DEVICE — HEYMAN DILATOR 22 FR

## (undated) DEVICE — GLOVE SRG BIOGEL 8

## (undated) DEVICE — UROCATCH BAG

## (undated) DEVICE — HF-RESECTION ELECTRODE PLASMALOOP LOOP, MEDIUM, 24 FR., 12°-30°, ESG TURIS: Brand: OLYMPUS

## (undated) DEVICE — URETERAL CATHETER ADAPTOR TIP

## (undated) DEVICE — GUIDEWIRE VASC .018 150CM 8CM TAP V-18

## (undated) DEVICE — BAG DRAINAGE URINARY W TOWER

## (undated) DEVICE — CATH FOLEY COUNCIL 22FR 5ML 2 WAY LUBRICATH

## (undated) DEVICE — GLOVE SRG BIOGEL 7.5

## (undated) DEVICE — Device

## (undated) DEVICE — SYSTEM  SYRINGE ASSIST DEVICE

## (undated) DEVICE — SYRINGE,TOOMEY,IRRIGATION,70CC,STERILE: Brand: MEDLINE

## (undated) DEVICE — TELFA NON-ADHERENT ABSORBENT DRESSING: Brand: TELFA

## (undated) DEVICE — CATH FOLEY COUNCIL 20FR 5ML 2 WAY LUBRICATH

## (undated) DEVICE — EVACUATOR BLADDER ELLIK DISP STRL

## (undated) DEVICE — PUMPING SYSTEM SINGLE ACTION STD

## (undated) DEVICE — BAG DECANTER

## (undated) DEVICE — BAG DRAINAGE UROCATCHER 4 SKYTRON

## (undated) DEVICE — SPECIMEN CONTAINER STERILE PEEL PACK

## (undated) DEVICE — DILATO HYDROPLUS .038 16FR URETERAL

## (undated) DEVICE — LUBRICANT JELLY SURGILUBE TUBE 4OZ FLIP TOP

## (undated) DEVICE — PREMIUM DRY TRAY LF: Brand: MEDLINE INDUSTRIES, INC.

## (undated) DEVICE — C-ARM: Brand: UNBRANDED

## (undated) DEVICE — HEYMAN DILATOR 10 FR

## (undated) DEVICE — HEYMAN DILATOR 24 FR

## (undated) DEVICE — PACK CUSTOM GU CYSTO PACK RF

## (undated) DEVICE — REM POLYHESIVE ADULT PATIENT RETURN ELECTRODE: Brand: VALLEYLAB

## (undated) DEVICE — MEDI-VAC NON-CONDUCTIVE SUCTION TUBING 6MM X 1.8M (6FT.) L: Brand: CARDINAL HEALTH

## (undated) DEVICE — GARMENT,MEDLINE,DVT,INT,CALF,FOAM,MED: Brand: MEDLINE

## (undated) DEVICE — Device: Brand: OLYMPUS

## (undated) DEVICE — STANDARD SURGICAL GOWN, L: Brand: CONVERTORS

## (undated) DEVICE — POV-IOD SOLUTION 4OZ BT

## (undated) DEVICE — DRAPE C ARM STRL 30 X 30

## (undated) DEVICE — DRAPE EQUIPMENT RF WAND

## (undated) DEVICE — GLOVE SRG BIOGEL 7

## (undated) DEVICE — 4-PORT MANIFOLD: Brand: NEPTUNE 2

## (undated) DEVICE — GUIDEWIRE STRGHT TIP 0.038 IN SOLO PLUS

## (undated) DEVICE — INVIEW CLEAR LEGGINGS: Brand: CONVERTORS

## (undated) DEVICE — CATH URETERAL OPEN END 6FR X 70CM

## (undated) DEVICE — DISPOSABLE OR TOWEL: Brand: CARDINAL HEALTH

## (undated) DEVICE — GLOVE INDICATOR PI UNDERGLOVE SZ 7.5 BLUE

## (undated) DEVICE — SKIN MARKER DUAL TIP WITH RULER CAP, FLEXIBLE RULER AND LABELS: Brand: DEVON

## (undated) DEVICE — CATH FOLEY 22FR 5ML 2 WAY SILICONE ELASTIMER

## (undated) DEVICE — CATH URETERAL 5FR X 70 CM FLEX TIP POLYUR BARD

## (undated) DEVICE — TOWEL SURG XR DETECT GREEN STRL RFD

## (undated) DEVICE — SCD SEQUENTIAL COMPRESSION COMFORT SLEEVE MEDIUM KNEE LENGTH: Brand: KENDALL SCD

## (undated) DEVICE — ASTOUND STANDARD SURGICAL GOWN, XL: Brand: CONVERTORS

## (undated) DEVICE — HEYMAN DILATOR 12 FR

## (undated) DEVICE — GLOVE INDICATOR PI UNDERGLOVE SZ 8.5 BLUE

## (undated) DEVICE — CYSTO TUBING TUR Y IRRIGATION

## (undated) DEVICE — STERILE SURGICAL LUBRICANT,  TUBE: Brand: SURGILUBE

## (undated) DEVICE — GUIDEWIRE STRGHT TIP 0.035 IN  SOLO PLUS